# Patient Record
Sex: FEMALE | Race: ASIAN | NOT HISPANIC OR LATINO | Employment: OTHER | URBAN - METROPOLITAN AREA
[De-identification: names, ages, dates, MRNs, and addresses within clinical notes are randomized per-mention and may not be internally consistent; named-entity substitution may affect disease eponyms.]

---

## 2017-10-20 ENCOUNTER — OFFICE VISIT - HEALTHEAST (OUTPATIENT)
Dept: FAMILY MEDICINE | Facility: CLINIC | Age: 32
End: 2017-10-20

## 2017-10-20 DIAGNOSIS — R73.01 IMPAIRED FASTING GLUCOSE: ICD-10-CM

## 2017-10-20 DIAGNOSIS — Z00.00 HEALTH CARE MAINTENANCE: ICD-10-CM

## 2017-10-20 DIAGNOSIS — K59.00 CONSTIPATION: ICD-10-CM

## 2017-10-20 DIAGNOSIS — Z11.3 SCREENING FOR STD (SEXUALLY TRANSMITTED DISEASE): ICD-10-CM

## 2017-10-20 DIAGNOSIS — Z23 NEED FOR IMMUNIZATION AGAINST INFLUENZA: ICD-10-CM

## 2017-10-20 LAB
CHOLEST SERPL-MCNC: 166 MG/DL
FASTING STATUS PATIENT QL REPORTED: YES
HBA1C MFR BLD: 5.6 % (ref 3.5–6)
HDLC SERPL-MCNC: 51 MG/DL
LDLC SERPL CALC-MCNC: 102 MG/DL
TRIGL SERPL-MCNC: 66 MG/DL

## 2017-10-20 ASSESSMENT — MIFFLIN-ST. JEOR: SCORE: 1194.75

## 2017-10-23 ENCOUNTER — COMMUNICATION - HEALTHEAST (OUTPATIENT)
Dept: FAMILY MEDICINE | Facility: CLINIC | Age: 32
End: 2017-10-23

## 2017-10-26 LAB
BKR LAB AP ABNORMAL BLEEDING: NO
BKR LAB AP BIRTH CONTROL/HORMONES: NORMAL
BKR LAB AP CERVICAL APPEARANCE: NORMAL
BKR LAB AP GYN ADEQUACY: NORMAL
BKR LAB AP GYN INTERPRETATION: NORMAL
BKR LAB AP HPV REFLEX: NORMAL
BKR LAB AP LMP: NORMAL
BKR LAB AP PATIENT STATUS: NORMAL
BKR LAB AP PREVIOUS ABNORMAL: NORMAL
BKR LAB AP PREVIOUS NORMAL: NORMAL
HIGH RISK?: NO
HPV INTERPRETATION - HISTORICAL: NORMAL
HPV INTERPRETER - HISTORICAL: NORMAL
PATH REPORT.COMMENTS IMP SPEC: NORMAL
RESULT FLAG (HE HISTORICAL CONVERSION): NORMAL

## 2017-11-17 ENCOUNTER — OFFICE VISIT - HEALTHEAST (OUTPATIENT)
Dept: FAMILY MEDICINE | Facility: CLINIC | Age: 32
End: 2017-11-17

## 2017-11-17 DIAGNOSIS — Z71.84 COUNSELING ABOUT TRAVEL: ICD-10-CM

## 2019-02-15 ENCOUNTER — OFFICE VISIT - HEALTHEAST (OUTPATIENT)
Dept: FAMILY MEDICINE | Facility: CLINIC | Age: 34
End: 2019-02-15

## 2019-02-15 DIAGNOSIS — I83.93 VARICOSE VEINS OF BOTH LOWER EXTREMITIES, UNSPECIFIED WHETHER COMPLICATED: ICD-10-CM

## 2019-02-20 ENCOUNTER — OFFICE VISIT - HEALTHEAST (OUTPATIENT)
Dept: VASCULAR SURGERY | Facility: CLINIC | Age: 34
End: 2019-02-20

## 2019-02-20 DIAGNOSIS — I78.1 SPIDER VEINS: ICD-10-CM

## 2019-02-20 DIAGNOSIS — I83.893 SYMPTOMATIC VARICOSE VEINS OF BOTH LOWER EXTREMITIES: ICD-10-CM

## 2019-02-20 DIAGNOSIS — I87.2 VENOUS INSUFFICIENCY OF BOTH LOWER EXTREMITIES: ICD-10-CM

## 2019-02-20 ASSESSMENT — MIFFLIN-ST. JEOR: SCORE: 1233.82

## 2019-12-06 ENCOUNTER — AMBULATORY - HEALTHEAST (OUTPATIENT)
Dept: LAB | Facility: CLINIC | Age: 34
End: 2019-12-06

## 2019-12-06 ENCOUNTER — OFFICE VISIT - HEALTHEAST (OUTPATIENT)
Dept: UROLOGY | Facility: CLINIC | Age: 34
End: 2019-12-06

## 2019-12-06 DIAGNOSIS — N20.1 CALCULUS OF URETER: ICD-10-CM

## 2019-12-06 LAB
ALBUMIN UR-MCNC: NEGATIVE MG/DL
APPEARANCE UR: ABNORMAL
BILIRUB UR QL STRIP: NEGATIVE
CALCIUM SERPL-MCNC: 9.3 MG/DL (ref 8.5–10.5)
CALCIUM, IONIZED MEASURED: 1.18 MMOL/L (ref 1.11–1.3)
COLOR UR AUTO: YELLOW
CREAT SERPL-MCNC: 0.71 MG/DL (ref 0.6–1.1)
GFR SERPL CREATININE-BSD FRML MDRD: >60 ML/MIN/1.73M2
GLUCOSE UR STRIP-MCNC: NEGATIVE MG/DL
HGB UR QL STRIP: ABNORMAL
ION CA PH 7.4: 1.15 MMOL/L (ref 1.11–1.3)
KETONES UR STRIP-MCNC: NEGATIVE MG/DL
LEUKOCYTE ESTERASE UR QL STRIP: NEGATIVE
NITRATE UR QL: NEGATIVE
PH UR STRIP: 7 [PH] (ref 5–8)
PH: 7.33 (ref 7.35–7.45)
PHOSPHATE SERPL-MCNC: 4.1 MG/DL (ref 2.5–4.5)
PTH-INTACT SERPL-MCNC: 65 PG/ML (ref 10–86)
SP GR UR STRIP: 1.02 (ref 1–1.03)
URATE SERPL-MCNC: 4.9 MG/DL (ref 2–7.5)
UROBILINOGEN UR STRIP-ACNC: ABNORMAL

## 2019-12-09 LAB
APPEARANCE STONE: NORMAL
COMPN STONE: NORMAL
NUMBER STONE: 1
SIZE STONE: NORMAL MM
SPECIMEN WT: 5 MG

## 2019-12-10 ENCOUNTER — AMBULATORY - HEALTHEAST (OUTPATIENT)
Dept: LAB | Facility: CLINIC | Age: 34
End: 2019-12-10

## 2019-12-10 DIAGNOSIS — N20.1 CALCULUS OF URETER: ICD-10-CM

## 2019-12-10 LAB
CALCIUM 24H UR-MRATE: 212 MG/24HR (ref 20–275)
CHLORIDE 24H UR-SRATE: 40 MMOL/24HR (ref 110–250)
CITRATE 24H UR-MCNC: 122 MG/24HR
CREATININE, 24 HR URINE - HISTORICAL: 1145.1 MG/24HR
MAGNESIUM 24H UR-MRATE: 44 MG/24 HR (ref 75–150)
MAGNESIUM UR-MCNC: 8 MG/DL
OXALATE MG/SPEC: 9 MG/24HR (ref 7–44)
PH UR STRIP: 6 [PH] (ref 4.5–8)
PHOSPHORUS URINE MG/SPEC: 568.7 MG/24HR
POTASSIUM 24H UR-SCNC: 17 MMOL/24HR (ref 30–90)
SODIUM 24H UR-SRATE: 54 MMOL/24HR (ref 40–217)
SPECIMEN VOL UR: 550 ML
SPECIMEN VOL UR: 550 ML
URIC ACID URINE MG/SPEC: 436 MG/24HR (ref 250–750)

## 2019-12-17 LAB
CALCIUM 24H UR-MRATE: 452 MG/24HR (ref 20–275)
CHLORIDE 24H UR-SRATE: 151 MMOL/24HR (ref 110–250)
CITRATE 24H UR-MCNC: 163 MG/24HR
CREATININE, 24 HR URINE - HISTORICAL: 1076.9 MG/24HR
MAGNESIUM 24H UR-MRATE: 66 MG/24 HR (ref 75–150)
MAGNESIUM UR-MCNC: 6 MG/DL
OXALATE MG/SPEC: 12.7 MG/24HR (ref 7–44)
PH UR STRIP: 6.5 [PH] (ref 4.5–8)
PHOSPHORUS URINE MG/SPEC: 707.3 MG/24HR
POTASSIUM 24H UR-SCNC: 35 MMOL/24HR (ref 30–90)
SODIUM 24H UR-SRATE: 144 MMOL/24HR (ref 40–217)
SPECIMEN VOL UR: 1100 ML
SPECIMEN VOL UR: 1100 ML
URIC ACID URINE MG/SPEC: 472 MG/24HR (ref 250–750)

## 2019-12-26 ENCOUNTER — COMMUNICATION - HEALTHEAST (OUTPATIENT)
Dept: EMERGENCY MEDICINE | Facility: HOSPITAL | Age: 34
End: 2019-12-26

## 2019-12-26 DIAGNOSIS — N30.00 ACUTE CYSTITIS WITHOUT HEMATURIA: ICD-10-CM

## 2020-02-20 ENCOUNTER — OFFICE VISIT - HEALTHEAST (OUTPATIENT)
Dept: FAMILY MEDICINE | Facility: CLINIC | Age: 35
End: 2020-02-20

## 2020-02-20 ENCOUNTER — COMMUNICATION - HEALTHEAST (OUTPATIENT)
Dept: FAMILY MEDICINE | Facility: CLINIC | Age: 35
End: 2020-02-20

## 2020-02-20 DIAGNOSIS — H61.22 IMPACTED CERUMEN OF LEFT EAR: ICD-10-CM

## 2020-02-20 DIAGNOSIS — N39.0 ACUTE UTI: ICD-10-CM

## 2020-02-20 LAB
ALBUMIN UR-MCNC: NEGATIVE MG/DL
APPEARANCE UR: CLEAR
BACTERIA #/AREA URNS HPF: ABNORMAL HPF
BILIRUB UR QL STRIP: NEGATIVE
COLOR UR AUTO: YELLOW
GLUCOSE UR STRIP-MCNC: NEGATIVE MG/DL
HGB UR QL STRIP: NEGATIVE
KETONES UR STRIP-MCNC: NEGATIVE MG/DL
LEUKOCYTE ESTERASE UR QL STRIP: ABNORMAL
NITRATE UR QL: NEGATIVE
PH UR STRIP: 6 [PH] (ref 5–8)
RBC #/AREA URNS AUTO: ABNORMAL HPF
SP GR UR STRIP: 1.01 (ref 1–1.03)
SQUAMOUS #/AREA URNS AUTO: ABNORMAL LPF
UROBILINOGEN UR STRIP-ACNC: ABNORMAL
WBC #/AREA URNS AUTO: ABNORMAL HPF
YEAST #/AREA URNS HPF: ABNORMAL HPF

## 2020-02-20 ASSESSMENT — MIFFLIN-ST. JEOR: SCORE: 1213.19

## 2020-02-21 ENCOUNTER — OFFICE VISIT - HEALTHEAST (OUTPATIENT)
Dept: UROLOGY | Facility: CLINIC | Age: 35
End: 2020-02-21

## 2020-02-21 ENCOUNTER — HOSPITAL ENCOUNTER (OUTPATIENT)
Dept: CT IMAGING | Facility: CLINIC | Age: 35
Discharge: HOME | End: 2020-02-21
Attending: SPECIALIST

## 2020-02-21 DIAGNOSIS — N20.1 CALCULUS OF URETER: ICD-10-CM

## 2020-02-21 DIAGNOSIS — R82.994 HYPERCALCIURIA: ICD-10-CM

## 2020-02-21 DIAGNOSIS — N20.0 CALCULUS OF KIDNEY: ICD-10-CM

## 2020-02-21 DIAGNOSIS — R82.991 HYPOCITRATURIA: ICD-10-CM

## 2020-02-21 DIAGNOSIS — N39.0 URINARY TRACT INFECTION WITHOUT HEMATURIA, SITE UNSPECIFIED: ICD-10-CM

## 2020-02-21 DIAGNOSIS — R10.9 FLANK PAIN: ICD-10-CM

## 2020-02-21 DIAGNOSIS — R34 LOW URINE OUTPUT: ICD-10-CM

## 2020-02-21 LAB
ALBUMIN UR-MCNC: NEGATIVE MG/DL
APPEARANCE UR: CLEAR
BACTERIA SPEC CULT: NO GROWTH
BILIRUB UR QL STRIP: NEGATIVE
COLOR UR AUTO: YELLOW
GLUCOSE UR STRIP-MCNC: NEGATIVE MG/DL
HGB UR QL STRIP: NEGATIVE
KETONES UR STRIP-MCNC: ABNORMAL MG/DL
LEUKOCYTE ESTERASE UR QL STRIP: NEGATIVE
NITRATE UR QL: NEGATIVE
PH UR STRIP: 6 [PH] (ref 5–8)
SP GR UR STRIP: >=1.03 (ref 1–1.03)
UROBILINOGEN UR STRIP-ACNC: ABNORMAL

## 2020-03-29 ENCOUNTER — HOSPITAL ENCOUNTER (OUTPATIENT)
Dept: ADMINISTRATIVE | Facility: OTHER | Age: 35
Discharge: HOME | End: 2020-03-29

## 2020-03-30 ENCOUNTER — AMBULATORY - HEALTHEAST (OUTPATIENT)
Dept: UROLOGY | Facility: CLINIC | Age: 35
End: 2020-03-30

## 2020-03-30 DIAGNOSIS — N20.1 CALCULUS OF URETER: ICD-10-CM

## 2020-04-03 ENCOUNTER — AMBULATORY - HEALTHEAST (OUTPATIENT)
Dept: LAB | Facility: CLINIC | Age: 35
End: 2020-04-03

## 2020-04-03 DIAGNOSIS — N20.1 CALCULUS OF URETER: ICD-10-CM

## 2020-04-03 LAB
CALCIUM 24H UR-MRATE: 382 MG/24HR (ref 20–275)
CHLORIDE 24H UR-SRATE: 208 MMOL/24HR (ref 110–250)
CITRATE 24H UR-MCNC: 198 MG/24HR
CREATININE, 24 HR URINE - HISTORICAL: 1531.3 MG/24HR
MAGNESIUM 24H UR-MRATE: 115 MG/24 HR (ref 75–150)
MAGNESIUM UR-MCNC: 4.7 MG/DL
OXALATE MG/SPEC: 38.2 MG/24HR (ref 7–44)
PH UR STRIP: 6 [PH] (ref 4.5–8)
PHOSPHORUS URINE MG/SPEC: 1002.1 MG/24HR
POTASSIUM 24H UR-SCNC: 53 MMOL/24HR (ref 30–90)
SODIUM 24H UR-SRATE: 213 MMOL/24HR (ref 40–217)
SPECIMEN VOL UR: 2450 ML
SPECIMEN VOL UR: 2450 ML
URIC ACID URINE MG/SPEC: 1027 MG/24HR (ref 250–750)

## 2020-04-07 ENCOUNTER — OFFICE VISIT - HEALTHEAST (OUTPATIENT)
Dept: UROLOGY | Facility: CLINIC | Age: 35
End: 2020-04-07

## 2020-04-07 DIAGNOSIS — N20.0 CALCULUS OF KIDNEY: ICD-10-CM

## 2020-04-07 DIAGNOSIS — R82.991 HYPOCITRATURIA: ICD-10-CM

## 2020-04-07 DIAGNOSIS — R34 LOW URINE OUTPUT: ICD-10-CM

## 2020-04-07 DIAGNOSIS — R82.994 HYPERCALCIURIA: ICD-10-CM

## 2020-04-07 DIAGNOSIS — R82.998 HIGH URINE SODIUM: ICD-10-CM

## 2020-04-07 DIAGNOSIS — Z87.442 HISTORY OF KIDNEY STONES: ICD-10-CM

## 2020-04-29 ENCOUNTER — COMMUNICATION - HEALTHEAST (OUTPATIENT)
Dept: FAMILY MEDICINE | Facility: CLINIC | Age: 35
End: 2020-04-29

## 2020-04-29 ENCOUNTER — OFFICE VISIT - HEALTHEAST (OUTPATIENT)
Dept: FAMILY MEDICINE | Facility: CLINIC | Age: 35
End: 2020-04-29

## 2020-04-29 ENCOUNTER — AMBULATORY - HEALTHEAST (OUTPATIENT)
Dept: FAMILY MEDICINE | Facility: CLINIC | Age: 35
End: 2020-04-29

## 2020-04-29 DIAGNOSIS — N93.9 VAGINAL BLEEDING: ICD-10-CM

## 2020-04-29 DIAGNOSIS — N30.01 ACUTE CYSTITIS WITH HEMATURIA: ICD-10-CM

## 2020-04-29 DIAGNOSIS — Z13.9 SCREENING FOR CONDITION: ICD-10-CM

## 2020-04-29 DIAGNOSIS — Z32.01 POSITIVE URINE PREGNANCY TEST: ICD-10-CM

## 2020-04-29 LAB
ALBUMIN UR-MCNC: NEGATIVE MG/DL
APPEARANCE UR: CLEAR
BACTERIA #/AREA URNS HPF: ABNORMAL HPF
BILIRUB UR QL STRIP: NEGATIVE
COLOR UR AUTO: YELLOW
GLUCOSE UR STRIP-MCNC: NEGATIVE MG/DL
HCG UR QL: POSITIVE
HGB UR QL STRIP: ABNORMAL
KETONES UR STRIP-MCNC: NEGATIVE MG/DL
LEUKOCYTE ESTERASE UR QL STRIP: ABNORMAL
NITRATE UR QL: NEGATIVE
PH UR STRIP: 6 [PH] (ref 5–8)
RBC #/AREA URNS AUTO: ABNORMAL HPF
SP GR UR STRIP: 1.01 (ref 1–1.03)
SQUAMOUS #/AREA URNS AUTO: ABNORMAL LPF
UROBILINOGEN UR STRIP-ACNC: ABNORMAL
WBC #/AREA URNS AUTO: ABNORMAL HPF
YEAST #/AREA URNS HPF: ABNORMAL HPF

## 2020-04-30 LAB — BACTERIA SPEC CULT: ABNORMAL

## 2020-05-08 ENCOUNTER — COMMUNICATION - HEALTHEAST (OUTPATIENT)
Dept: FAMILY MEDICINE | Facility: CLINIC | Age: 35
End: 2020-05-08

## 2020-05-15 ENCOUNTER — RECORDS - HEALTHEAST (OUTPATIENT)
Dept: ADMINISTRATIVE | Facility: OTHER | Age: 35
End: 2020-05-15

## 2020-05-29 ENCOUNTER — RECORDS - HEALTHEAST (OUTPATIENT)
Dept: ADMINISTRATIVE | Facility: OTHER | Age: 35
End: 2020-05-29

## 2020-06-04 ENCOUNTER — COMMUNICATION - HEALTHEAST (OUTPATIENT)
Dept: FAMILY MEDICINE | Facility: CLINIC | Age: 35
End: 2020-06-04

## 2020-06-04 DIAGNOSIS — O98.111 SYPHILIS AFFECTING PREGNANCY IN FIRST TRIMESTER: ICD-10-CM

## 2020-06-05 ENCOUNTER — AMBULATORY - HEALTHEAST (OUTPATIENT)
Dept: NURSING | Facility: CLINIC | Age: 35
End: 2020-06-05

## 2020-06-12 ENCOUNTER — AMBULATORY - HEALTHEAST (OUTPATIENT)
Dept: NURSING | Facility: CLINIC | Age: 35
End: 2020-06-12

## 2020-06-15 ENCOUNTER — AMBULATORY - HEALTHEAST (OUTPATIENT)
Dept: MATERNAL FETAL MEDICINE | Facility: HOSPITAL | Age: 35
End: 2020-06-15

## 2020-06-15 DIAGNOSIS — O26.90 PREGNANCY, ANTEPARTUM, COMPLICATIONS: ICD-10-CM

## 2020-06-18 ENCOUNTER — AMBULATORY - HEALTHEAST (OUTPATIENT)
Dept: NURSING | Facility: CLINIC | Age: 35
End: 2020-06-18

## 2020-07-20 ENCOUNTER — AMBULATORY - HEALTHEAST (OUTPATIENT)
Dept: MATERNAL FETAL MEDICINE | Facility: HOSPITAL | Age: 35
End: 2020-07-20

## 2020-07-24 ENCOUNTER — OFFICE VISIT - HEALTHEAST (OUTPATIENT)
Dept: MATERNAL FETAL MEDICINE | Facility: HOSPITAL | Age: 35
End: 2020-07-24

## 2020-07-24 ENCOUNTER — RECORDS - HEALTHEAST (OUTPATIENT)
Dept: ADMINISTRATIVE | Facility: OTHER | Age: 35
End: 2020-07-24

## 2020-07-24 ENCOUNTER — RECORDS - HEALTHEAST (OUTPATIENT)
Dept: ULTRASOUND IMAGING | Facility: HOSPITAL | Age: 35
End: 2020-07-24

## 2020-07-24 DIAGNOSIS — O26.90 PREGNANCY RELATED CONDITIONS, UNSPECIFIED, UNSPECIFIED TRIMESTER: ICD-10-CM

## 2020-07-24 DIAGNOSIS — O26.90 PREGNANCY, ANTEPARTUM, COMPLICATIONS: ICD-10-CM

## 2020-07-24 DIAGNOSIS — O09.522 MULTIGRAVIDA OF ADVANCED MATERNAL AGE IN SECOND TRIMESTER: ICD-10-CM

## 2020-12-08 ENCOUNTER — HOSPITAL ENCOUNTER (OUTPATIENT)
Dept: MEDSURG UNIT | Facility: CLINIC | Age: 35
Discharge: HOME | End: 2020-12-08
Attending: OBSTETRICS & GYNECOLOGY | Admitting: OBSTETRICS & GYNECOLOGY

## 2020-12-08 LAB
ALBUMIN UR-MCNC: NEGATIVE MG/DL
APPEARANCE UR: CLEAR
BACTERIA #/AREA URNS HPF: ABNORMAL HPF
BILIRUB UR QL STRIP: NEGATIVE
COLOR UR AUTO: ABNORMAL
GLUCOSE UR STRIP-MCNC: NEGATIVE MG/DL
HGB UR QL STRIP: NEGATIVE
KETONES UR STRIP-MCNC: NEGATIVE MG/DL
LEUKOCYTE ESTERASE UR QL STRIP: ABNORMAL
MUCOUS THREADS #/AREA URNS LPF: ABNORMAL LPF
NITRATE UR QL: NEGATIVE
PH UR STRIP: 7 [PH] (ref 4.5–8)
RBC #/AREA URNS AUTO: ABNORMAL HPF
SP GR UR STRIP: 1 (ref 1–1.03)
SQUAMOUS #/AREA URNS AUTO: ABNORMAL LPF
UROBILINOGEN UR STRIP-ACNC: ABNORMAL
WBC #/AREA URNS AUTO: ABNORMAL HPF

## 2020-12-08 ASSESSMENT — MIFFLIN-ST. JEOR: SCORE: 1345.86

## 2020-12-09 ENCOUNTER — AMBULATORY - HEALTHEAST (OUTPATIENT)
Dept: OBGYN | Facility: CLINIC | Age: 35
End: 2020-12-09

## 2020-12-09 DIAGNOSIS — Z11.59 ENCOUNTER FOR SCREENING FOR OTHER VIRAL DISEASES: ICD-10-CM

## 2020-12-10 LAB — BACTERIA SPEC CULT: ABNORMAL

## 2020-12-11 ENCOUNTER — SURGERY - HEALTHEAST (OUTPATIENT)
Dept: OBGYN | Facility: CLINIC | Age: 35
End: 2020-12-11

## 2020-12-11 ENCOUNTER — COMMUNICATION - HEALTHEAST (OUTPATIENT)
Dept: SCHEDULING | Facility: CLINIC | Age: 35
End: 2020-12-11

## 2020-12-11 ENCOUNTER — ANESTHESIA - HEALTHEAST (OUTPATIENT)
Dept: OBGYN | Facility: CLINIC | Age: 35
End: 2020-12-11

## 2020-12-11 ASSESSMENT — MIFFLIN-ST. JEOR: SCORE: 1345.86

## 2020-12-14 ENCOUNTER — COMMUNICATION - HEALTHEAST (OUTPATIENT)
Dept: OBGYN | Facility: CLINIC | Age: 35
End: 2020-12-14

## 2021-05-26 VITALS — HEART RATE: 60 BPM | TEMPERATURE: 97.7 F | SYSTOLIC BLOOD PRESSURE: 133 MMHG | DIASTOLIC BLOOD PRESSURE: 78 MMHG

## 2021-05-26 VITALS — DIASTOLIC BLOOD PRESSURE: 63 MMHG | SYSTOLIC BLOOD PRESSURE: 111 MMHG | HEART RATE: 58 BPM | TEMPERATURE: 97.9 F

## 2021-05-28 ENCOUNTER — RECORDS - HEALTHEAST (OUTPATIENT)
Dept: ADMINISTRATIVE | Facility: CLINIC | Age: 36
End: 2021-05-28

## 2021-05-31 VITALS — WEIGHT: 128.06 LBS | BODY MASS INDEX: 23.81 KG/M2

## 2021-05-31 VITALS — HEIGHT: 62 IN | WEIGHT: 124.44 LBS | BODY MASS INDEX: 22.9 KG/M2

## 2021-06-01 ENCOUNTER — RECORDS - HEALTHEAST (OUTPATIENT)
Dept: ADMINISTRATIVE | Facility: CLINIC | Age: 36
End: 2021-06-01

## 2021-06-02 VITALS — WEIGHT: 128.31 LBS | BODY MASS INDEX: 23.85 KG/M2

## 2021-06-02 VITALS — BODY MASS INDEX: 24.16 KG/M2 | HEIGHT: 62 IN | WEIGHT: 131.3 LBS

## 2021-06-04 VITALS
DIASTOLIC BLOOD PRESSURE: 66 MMHG | BODY MASS INDEX: 25.32 KG/M2 | HEART RATE: 71 BPM | SYSTOLIC BLOOD PRESSURE: 111 MMHG | TEMPERATURE: 98.1 F | RESPIRATION RATE: 15 BRPM | WEIGHT: 134 LBS | OXYGEN SATURATION: 100 %

## 2021-06-04 VITALS
WEIGHT: 130.25 LBS | SYSTOLIC BLOOD PRESSURE: 100 MMHG | HEIGHT: 61 IN | HEART RATE: 67 BPM | DIASTOLIC BLOOD PRESSURE: 58 MMHG | OXYGEN SATURATION: 97 % | BODY MASS INDEX: 24.59 KG/M2 | TEMPERATURE: 97.7 F

## 2021-06-04 NOTE — PATIENT INSTRUCTIONS - HE
Patient Stated Goal: Prevent further stones  Steps for collecting a 24 hour urine specimen    Please follow the directions carefully. All urine voided for a 24-hour period needs to be collected into the jug.  DO NOT change any of your  normal  daily habits when doing this test. Continue to follow your regular diet, intake of fluids, and usual activity level. Pick the most convenient day with your schedule, perhaps on a weekend or a day off.    Start your Diet Log the day before collection and continue on the day of urine collection.  You MUST bring Diet Log with you on follow up visit to discuss results.    One 24hr Urine Collection     Two 24hr Urine Collections  (do not collect on consecutive days)    PLEASE COMPLETE THE 2nd JUG WITHIN 1-2 WEEKS FROM THE 1st JUG    STEP 1  Empty your bladder completely into the toilet. This will be your start time. Write your full legal name, start date and time on the jug label.  Collection start and stop times need to match exactly!  For example:  6 am to 6 am.    STEP 2  The next time you urinate, empty your bladder directly into the jug or collection hat and pour urine into the jug.  Screw the lid back onto the jug.  Do not spill!    STEP 3  Place the jug in the refrigerator or a cooler with ice during the collection period.  Failure to keep it cool could cause inaccurate test results. DO NOT Freeze.    STEP 4  Continue collecting all urine into the jug for the rest of the day, for the full 24 hours.  DO NOT stop early or go over 24 hours!    STEP 5  Exactly 24 hours from start of collection, write your full legal name, stop date and time on the jug label.   Collection start and stop times need to match exactly!  For example:  6 am to 6 am.  Failure to label correctly will result in recollection of urine specimen.    STEP 6  Return each jug within 24 hours after final urination.     STEP 7  Drop off jug locations:   [facility]: Mon-Fri 7am-7pm - Closed on  weekends  [facility]: Mon-Fri 7am-5pm - Closed on Sunday  [facility]: Mon-Fri 7am-6:30pm - Closed on weekends    STEP 8  Please call [facility] after return of your final jug to schedule your follow-up visit. [phone]

## 2021-06-04 NOTE — PROGRESS NOTES
Assessment/Plan:        Diagnoses and all orders for this visit:    Calculus of ureter  -     Urinalysis Macroscopic  -     Stone Analysis; Future  -     Stone Analysis  -     Magnesium, 24 Hour Urine; Future  -     Stone Formation, 24 Hour Urine (does not include Magnesium); Standing  -     Uric Acid; Future; Expected date: 12/20/2019  -     Calcium, Ionized, Measured; Future; Expected date: 12/20/2019  -     Parathyroid Hormone Intact with Minerals; Future; Expected date: 12/20/2019  -     Patient Stated Goal: Prevent further stones  -     24 Hour Urine Collection Steps Education      Stone Management Plan  [facility] 12/6/2019   Urinary Tract Infection No suspicion of infection   Renal Colic Asymptomatic at this time   Renal Failure No suspicion of renal failure   Current CT date 11/24/2019   Right sided stones? No   R Stone Event No current event   Left sided stones? Yes   L Number of ureteral stones 1   L GSD of ureteral stones 3   L Location of ureteral stone Distal   L Number of kidney stones  No renal stones   L GSD of kidney stones N/A   L Hydronephrosis Mild   L Stone Event New stone passed prior to visit         Subjective:      HPI  Ms. Kelly Shannon is a 34 y.o. Hmong female presenting to the [facility] following [facility] ER visit for urolithiasis.    She is a first time unidentified composition stone former who has not required stone clearance procedures. She has not previously participated in stone risk evaluation. She has no identified modifiable stone risk factors. She has no identified non-modifiable stone risk factors.    She was seen in ER 11/24/19 for acute onset urinary urgency and dysuria x 3 days. She had associated left flank and abdominal pain, that was intermittent and sharp. No associated alleviating or aggravating factors. No similar pain in past. Workup was notable for CT reporting an obstructing 2 mm left UVJ stone. She was sent home with medications.    She  passed the stone a few days later and has brought it in. She is asymptomatic at present. She denies symptoms of fever, chills, flank pain, nausea, vomiting, urinary frequency and dysuria.     CT scan is personally reviewed and demonstrates a ~ 2 mm left UVJ stone which has passed in interval. Mild left hydronephrosis.    Significant labs from presentation include moderate hematuria, no pyuria, negative nitrite, no bacteria, normal WBC, normal creatinine and normal potassium.    PLAN    33 yo Hmong F first time stone former with interval passage of left distal ureteral stone, specimen retrieved.    Will send stone for analysis.    Will initiate comprehensive stone risk evaluation. Serum stone risk chemistries including parathyroid hormone and ionized calcium will be obtained before next visit. Two 24 hour urine collections and dietary journal will be obtained at earliest covenience. Stone prevention measures reviewed with patient. Patient verbalized understanding. Patient agrees with plan as discussed.  She will return to clinic when labs are available.       Patient also seen and examined by LUIS DANIEL Sprague   Review of Systems  A 12 point comprehensive review of systems is negative except for HPI    Past Medical History:   Diagnosis Date     Idiopathic Thrombocytopenic Purpura     Created by Conversion      Kidney stone      Varicose vein of leg      Past Surgical History:   Procedure Laterality Date     NO PAST SURGERIES       No current outpatient medications on file.     No current facility-administered medications for this visit.      No Known Allergies    Social History     Socioeconomic History     Marital status:      Spouse name: Not on file     Number of children: Not on file     Years of education: Not on file     Highest education level: Not on file   Occupational History     Occupation:    Social Needs     Financial resource strain: Not on file     Food insecurity:      Worry: Not on file     Inability: Not on file     Transportation needs:     Medical: Not on file     Non-medical: Not on file   Tobacco Use     Smoking status: Never Smoker     Smokeless tobacco: Never Used   Substance and Sexual Activity     Alcohol use: No     Drug use: Not on file     Sexual activity: Not on file   Lifestyle     Physical activity:     Days per week: Not on file     Minutes per session: Not on file     Stress: Not on file   Relationships     Social connections:     Talks on phone: Not on file     Gets together: Not on file     Attends [employer] service: Not on file     Active member of club or organization: Not on file     Attends meetings of clubs or organizations: Not on file     Relationship status: Not on file     Intimate partner violence:     Fear of current or ex partner: Not on file     Emotionally abused: Not on file     Physically abused: Not on file     Forced sexual activity: Not on file   Other Topics Concern     Not on file   Social History Narrative     Not on file       Family History   Problem Relation Age of Onset     No Medical Problems Daughter      Urolithiasis Neg Hx      Gout Neg Hx      Clotting disorder Neg Hx      Diabetes Neg Hx      Cancer Neg Hx      Heart disease Neg Hx        Objective:      Physical Exam  Vitals:    12/06/19 0857   BP: 133/78   Pulse: 60   Temp: 97.7  F (36.5  C)     General - well developed, well nourished, appropriate for age. Appears no distress at this time   Heart - regular rate and rhythm, no murmur  Respiratory - normal effort, clear to auscultation, good air entry without adventitious noises  Abdomen - slender soft, non-tender, no hepatosplenomegaly, no masses.   - no flank tenderness, no suprapubic tenderness, kidney and bladder non-palpable  MSK - normal spinal curvature. no spinal tenderness. normal gait. muscular strength intact.  Neurology - cranial nerves II-XII grossly intact, normal sensation, no unsteadiness  Skin - intact, no  bruising, no gouty tophi  Psych - oriented to time, place, and person, normal mood and affect.    Labs  Urinalysis POC (Office):  Nitrite, UA   Date Value Ref Range Status   12/06/2019 Negative Negative Final   11/24/2019 Negative Negative Final   07/08/2013 Negative (Negative) Final       Lab Urinalysis:  Blood, UA   Date Value Ref Range Status   12/06/2019 Large (!) Negative Final   11/24/2019 Moderate (!) Negative Final   07/08/2013 Negative (Negative) Final     Nitrite, UA   Date Value Ref Range Status   12/06/2019 Negative Negative Final   11/24/2019 Negative Negative Final   07/08/2013 Negative (Negative) Final     Leukocytes, UA   Date Value Ref Range Status   12/06/2019 Negative Negative Final   11/24/2019 Negative Negative Final   07/08/2013 Small (!) (Negative) Final     pH, UA   Date Value Ref Range Status   12/06/2019 7.0 5.0 - 8.0 Final   11/24/2019 7.0 4.5 - 8.0 Final   07/08/2013 7.0 (5.0-8.0) Final    and Acute Labs   CBC   WBC   Date Value Ref Range Status   11/24/2019 6.9 4.0 - 11.0 thou/uL Final   07/08/2013 8.4 4.0 - 11.0 thou/uL Final   01/20/2012 6.1 4.0 - 11.0 thou/uL Final     Hemoglobin   Date Value Ref Range Status   11/24/2019 13.1 12.0 - 16.0 g/dL Final   10/20/2017 13.7 12.0 - 16.0 g/dL Final   07/08/2013 15.4 12.0 - 16.0 g/dL Final     Platelets   Date Value Ref Range Status   11/24/2019 235 140 - 440 thou/uL Final   07/08/2013 275 140 - 440 thou/uL Final   01/20/2012 181 140 - 440 thou/uL Final   , C Reactive Protein  No results found for: CRP, Renal Panel  [facility]  Creatinine   Date Value Ref Range Status   11/24/2019 0.71 0.60 - 1.10 mg/dL Final   07/08/2013 0.69 0.60 - 1.10 mg/dL Final   07/19/2011 0.58 (L) 0.60 - 1.10 mg/dL Final     Potassium   Date Value Ref Range Status   11/24/2019 3.5 3.5 - 5.0 mmol/L Final   07/08/2013 4.0 3.5 - 5.0 mmol/L Final   07/19/2011 3.7 3.5 - 5.0 mmol/L Final     Calcium   Date Value Ref Range Status   11/24/2019 9.7 8.5 - 10.5 mg/dL Final   07/08/2013  10.1 8.5 - 10.5 mg/dL Final   07/15/2011 9.4 8.5 - 10.5 mg/dL Final    and Urine Culture  No results found for: CULTURE

## 2021-06-05 VITALS — HEIGHT: 62 IN | WEIGHT: 156 LBS | BODY MASS INDEX: 28.71 KG/M2

## 2021-06-05 VITALS — WEIGHT: 156 LBS | HEIGHT: 62 IN | BODY MASS INDEX: 28.71 KG/M2

## 2021-06-06 NOTE — PROGRESS NOTES
Assessment/Plan:     1. Acute UTI  Urinalysis-UC if Indicated    nitrofurantoin, macrocrystal-monohydrate, (MACROBID) 100 MG capsule    Culture, Urine   2. Impacted cerumen of left ear  Ear cerumen removal       Diagnoses and all orders for this visit:    Acute UTI  -     Urinalysis-UC if Indicated  -     nitrofurantoin, macrocrystal-monohydrate, (MACROBID) 100 MG capsule; Take 1 capsule (100 mg total) by mouth 2 (two) times a day for 7 days.  Dispense: 14 capsule; Refill: 0  -     Culture, Urine  - Patient has nearly completed 7-day course of Keflex.  She does report some improvement in symptoms but still has symptoms of dysuria, lower abdominal discomfort, urinary frequency and decreased urine output.  Will recheck UA and urine culture today.  In meantime we will switch her over to nitrofurantoin which recent urine culture shows that bacteria causing her infection should be sensitive to this antibiotic.  She was counseled on use of medication and common side effects.    Impacted cerumen of left ear  -     Ear cerumen removal was performed today by hospitals    Need for influenza vaccine  -     Influenza, Seasonal Quad, PF =/> 6months  -Counseled patient on vaccine and side effects             Subjective:      Kelly Shannon is a 35 y.o. female who comes in today for emergency room follow-up visit.  She is seen with the assistance of a .  She had an emergency department visit on 2/13/2020 due to dysuria.  UA showed presence of UTI.  She was treated with Keflex.  Culture grew out ESBL E. coli as well as group B strep.  Culture did show resistance to Keflex.  Patient has a couple doses of antibiotic left.  She does feel that symptoms have improved but she still complains of painful urination, urinary frequency and decreased urine output.  She does not have back pain but she does have some pain in her lower abdomen.  She has a history of a kidney stone and she also was seen in the emergency department on a couple  of occasions over the past few months for UTI.  She wonders about the underlying cause of her recurrent UTIs and whether it could be related to kidney stones.  Last imaging did show that patient had passed her stone however.  We reviewed her medications and allergies and updated the chart.  She currently denies fevers and chills.  No nausea or vomiting.  She does complain of a plugged feeling in her left ear.  Would like this looked at.  No cold symptoms.  Review of systems is assessed and is otherwise negative.  No other concerns today.    Current Outpatient Medications   Medication Sig Dispense Refill     ibuprofen (ADVIL,MOTRIN) 600 MG tablet Take 1 tablet (600 mg total) by mouth every 6 (six) hours as needed. 28 tablet 0     nitrofurantoin, macrocrystal-monohydrate, (MACROBID) 100 MG capsule Take 1 capsule (100 mg total) by mouth 2 (two) times a day for 7 days. 14 capsule 0     nitrofurantoin, macrocrystal-monohydrate, (MACROBID) 100 MG capsule Take 1 capsule (100 mg total) by mouth 2 (two) times a day. 20 capsule 2     No current facility-administered medications for this visit.        Past Medical History, Family History, and Social History reviewed.  Past Medical History:   Diagnosis Date     Idiopathic Thrombocytopenic Purpura     Created by Conversion      Kidney stone      Varicose vein of leg      Past Surgical History:   Procedure Laterality Date     NO PAST SURGERIES       Patient has no known allergies.  Family History   Problem Relation Age of Onset     No Medical Problems Daughter      Urolithiasis Neg Hx      Gout Neg Hx      Clotting disorder Neg Hx      Diabetes Neg Hx      Cancer Neg Hx      Heart disease Neg Hx      Social History     Socioeconomic History     Marital status:      Spouse name: Not on file     Number of children: Not on file     Years of education: Not on file     Highest education level: Not on file   Occupational History     Occupation:    Social Needs  "    Financial resource strain: Not on file     Food insecurity:     Worry: Not on file     Inability: Not on file     Transportation needs:     Medical: Not on file     Non-medical: Not on file   Tobacco Use     Smoking status: Never Smoker     Smokeless tobacco: Never Used   Substance and Sexual Activity     Alcohol use: No     Drug use: Not on file     Sexual activity: Not on file   Lifestyle     Physical activity:     Days per week: Not on file     Minutes per session: Not on file     Stress: Not on file   Relationships     Social connections:     Talks on phone: Not on file     Gets together: Not on file     Attends Tenriism service: Not on file     Active member of club or organization: Not on file     Attends meetings of clubs or organizations: Not on file     Relationship status: Not on file     Intimate partner violence:     Fear of current or ex partner: Not on file     Emotionally abused: Not on file     Physically abused: Not on file     Forced sexual activity: Not on file   Other Topics Concern     Not on file   Social History Narrative     Not on file         Review of systems is as stated in HPI, and the remainder of the 10 system review is otherwise negative.    Objective:     Vitals:    02/20/20 1418   BP: 100/58   Patient Site: Right Arm   Patient Position: Sitting   Cuff Size: Adult Regular   Pulse: 67   Temp: 97.7  F (36.5  C)   TempSrc: Oral   SpO2: 97%   Weight: 130 lb 4 oz (59.1 kg)   Height: 5' 1\" (1.549 m)    Body mass index is 24.61 kg/m .    General appearance: alert, appears stated age and cooperative  Head: Normocephalic, without obvious abnormality, atraumatic   Ears: There is cerumen impaction present in left ear  Lungs: clear to auscultation bilaterally  Heart: regular rate and rhythm, S1, S2 normal, no murmur, click, rub or gallop  Abdomen: Soft, normal bowel sounds, tender to palpation across lower abdomen, no mass, no rebound or guarding  Extremities: extremities normal, " atraumatic, no cyanosis or edema  MSK: no CVA tenderness        This note has been dictated using voice recognition software. Any grammatical or context distortions are unintentional and inherent to the the software.

## 2021-06-06 NOTE — PATIENT INSTRUCTIONS - HE
Patient Stated Goal: Prevent further stones  INCREASING FLUIDS TO DECREASE RISK    Low Urinary Volume:     Kidney stones form because there is not enough water to dissolve the concentrated chemicals and minerals in urine.     Studies have found that people who make kidney stones should drink enough fluids so that they produce at least 2 liters (2 quarts) of urine per day.     Studies have shown that virtually every fluid you might drink decreases the risk of stone formation. Acceptable fluids include milk, coffee, diet and regular sodas, alcoholic beverages, fruit juices and even plain old water.    Increasing fluid intake will increase urine volume and decrease the chance of kidney stone formation.    Fluids:    Anything liquid that fits in a glass counts.     One way to gauge if your body has enough fluids is to check the color of your urine. If the urine is dark and yellow you do not have enough fluids. Urine will be pale yellow to clear if your body has enough fluids.    What we need to survive:    Most fluid we drink is lost through evaporation, more if active in hot humid environments    Body wastes can be cleared in a small amount of urine    All fluid that is consumed in excess of necessary losses  dilutes the urine    Ways to Increase Fluids Daily:    Drink more fluids throughout the day and into the evening. (You may need to awake from sleep to urinate which is a good indication of volume status)    Keep your refrigerator stocked with beverages you like    Drink a variety of fluids - mix it up    Add another beverage to your regular beverage at every meal    Keep a beverage at your desk (work area) and finish it before you leave for breaks, meetings, lunch and end of day    Use larger volume glasses    Whenever you pass a water fountain - stop and drink    Drink a beverage with snacks, if it is a salty snack, double the beverage    Take medication with a full glass of water/fluid    Keep a bottle of  water in your car and drink every 20 miles    Eat high water content fruits (melons, oranges, grapes, etc.)    Flavor water with a squeeze of lemon or lime or Crystal Light     If you do something repetitive throughout the day, link it with having something to drink    When you give your child/children something to drink, you have something to drink also    [facility] can respond to your questions or concerns 24 hours a day at [phone].      FOODS RICH IN SODIUM/SALT    If you eat too much salt or sodium, you may increase the calcium in your urine.  That may cause stones to form.  A low sodium/salt diet is recommended to reduce excess urinary calcium excretion that can potentially form a kidney stone.  The recommended sodium intake by [facility] is less than 2500 milligrams per day.     You should usually avoid these items:    ? Salt - One teaspoon of table salt has 2400 milligrams of sodium  ? Processed foods - salt is added in large amounts to some regular foods    ? Examples are:      Canned foods - soups, stews, sauces, gravy mixes, and some vegetables      Frozen foods - dinners, entrees, vegetables with sauces      Snack foods - salted chips, popcorn, pretzels, pork rinds and crackers      Packaged starchy foods - seasoned noodle or rice dishes, stuffing mix, macaroni and cheese dinner      Instant cooking foods to which you add hot water and stir - potatoes, cereals, noodles, etc.       (salt is added to make precooked foods absorb water faster)      Mixes - cornbread, biscuit, cake or pudding      Meats and cheeses  - Deli or lunch meats - bologna, ham, turkey, roast beef, etc.  - Cured or smoked meats - corned beef, stone, sausage of any kind  (doug, link, Kielbasa, Tamazight, wieners or hot dogs)  - Canned meats - potted meats, spreads, Yesenia sausage, etc.  - Cheeses - read labels and avoid those with more than 140 mg sodium per serving;  - Examples are:       American cheese      Hajaeta         Suzanne Whiz      ? Condiments, Sauces and Seasonings        Mustard, ketchup, salad dressings, bouillon cubes or granules      Sauces - Worcestershire, barbecue, pizza, chili, steak, soy, or horseradish sauce      Meat tenderizer, monosodium glutamate      Any seasoning that has  salt  in the name or on the label;  -  Avoid celery, garlic and onion salt; however use garlic or onion powder or flakes instead       Pickles and olives    What can you use to season you food?         Tart - try lemon or lime juice, vinegar      Hot - peppers are low in sodium; hot sauce has salt, but using just a drop or two will not add        up to much       Herbs and spices - onions, garlic, salt-free seasonings

## 2021-06-06 NOTE — TELEPHONE ENCOUNTER
Forms Request  Name of form/paperwork: Other:  Note for work   Have you been seen for this request: Yes:  Had just left her appointment with Dr. Pereyra and forgot to ask for a letter.   Do we have the form: No its a note that she is wanting for missing work.  When is form needed by: wanting to  today or tomorrow if not able to get it today. Please call the patient when able to .   How would you like the form returned:   Patient Notified form requests are processed in 3-5 business days: Yes    Okay to leave a detailed message? Yes

## 2021-06-06 NOTE — PROGRESS NOTES
Assessment/Plan:        Diagnoses and all orders for this visit:    Urinary tract infection without hematuria, site unspecified  -     CT Abdomen Pelvis Without Oral Without IV Contrast; Future; Expected date: 02/21/2020  -     nitrofurantoin, macrocrystal-monohydrate, (MACROBID) 100 MG capsule; Take 1 capsule (100 mg total) by mouth 2 (two) times a day.  Dispense: 20 capsule; Refill: 2    Calculus of ureter  -     Urinalysis Macroscopic  -     CT Abdomen Pelvis Without Oral Without IV Contrast; Future; Expected date: 02/21/2020    Flank pain  -     CT Abdomen Pelvis Without Oral Without IV Contrast; Future; Expected date: 02/21/2020      Stone Management Plan  [facility] 12/6/2019   Urinary Tract Infection No suspicion of infection   Renal Colic Asymptomatic at this time   Renal Failure No suspicion of renal failure   Current CT date 11/24/2019   Right sided stones? No   R Stone Event No current event   Left sided stones? Yes   L Number of ureteral stones 1   L GSD of ureteral stones 3   L Location of ureteral stone Distal   L Number of kidney stones  No renal stones   L GSD of kidney stones N/A   L Hydronephrosis Mild   L Stone Event New stone passed prior to visit             Subjective:      HPI  Ms. Kelly Shannon is a 35 y.o.  female returning to the [facility] for for follow-up of stone risk studies having passed a stone back in December that was 90% calcium phosphate appetite and 10% calcium oxalate.  Subsequent to that she had 2 urinary tract infections both E. coli that were resistant to Bactrim.  She was seen in the ER on 12/22/2019 with dysuria frequency urgency found to have pyuria with culture showing E. coli resistant to Bactrim but she was started on and it was not changed.  She then had a recurrence of her symptoms of urgency frequency burning.  She was seen in the ER on 2/13/2020 again and culture showed E. Coli.  She was treated with Keflex and when sensitivities came  out the organism was resistant to to Keflex.  On 2/20/2020 Dr. Pereyra changed the prescription to Macrobid which was sensitive on both cultures from 12/22/2019 and 2/13/2020.    Patient comes at this time wondering where the recurrent infections are coming from as she is never had them before.  Patient did not have a follow-up CT after passing the one small stone was told Dr. Pereyra wondered if this might be useful.  In addition patient comes in inquiring about the results of her 24-hour urine stone studies and serum studies.    Serum studies included a normal PTH calcium and uric acid with creatinine of 0.71.  24-hour urine studies demonstrated extremely low volume at 1100 cc and 550 cc.  Calcium was markedly elevated at 452 on one occasion 212 and the other sodium 144 on one occasion 54 on the other citrate low at 163 and 122 oxalate low at 12 and 9.0 pH 6.5 and 6.0 magnesium low at 44 and 66.    On questioning it turns out she did her 24-hour urine studies when she was infected and having difficulty with her voiding pattern.    UA today on Macrobid demonstrates specific gravity 1.030 pH 6- blood nitrate and leukocyte.    Patient symptoms have abated and she is not having any  urgency frequency or burning at this time.  She states she is back to her normal voiding pattern of voiding every 3-4 hours with nocturia 0-1 time at most.    Personal review of CT scan without contrast obtained 2/21/2020 demonstrates no evidence of renal or bladder stones. No hydrodonephrosis    Impression recurrent UTI with resistant organism currently on appropriate antibiotic specifically Macrobid.  We will have her maintain Macrobid for period of at least 2 weeks.    Factors regarding stones are extremely low volume hypocitraturia low magnesium hypercalciuria    Plan continue Macrobid 100 p.o. twice daily total of 17 days--keep Macrobid on hand at the moment should she have a recurrence of symptoms   magnesium oxide supplement 200 -400mg  daily increase fluid intake to allow for increased urine output of 2400 cc per 24-hour citrate rich diet,   follow-up UA and 24-hour urine 1 month         ROS   Review of systems is negative except for HPI.    Past Medical History:   Diagnosis Date     Idiopathic Thrombocytopenic Purpura     Created by Conversion      Kidney stone      Varicose vein of leg        Past Surgical History:   Procedure Laterality Date     NO PAST SURGERIES         Current Outpatient Medications   Medication Sig Dispense Refill     nitrofurantoin, macrocrystal-monohydrate, (MACROBID) 100 MG capsule Take 1 capsule (100 mg total) by mouth 2 (two) times a day for 7 days. 14 capsule 0     ibuprofen (ADVIL,MOTRIN) 600 MG tablet Take 1 tablet (600 mg total) by mouth every 6 (six) hours as needed. 28 tablet 0     No current facility-administered medications for this visit.        No Known Allergies    Social History     Socioeconomic History     Marital status:      Spouse name: Not on file     Number of children: Not on file     Years of education: Not on file     Highest education level: Not on file   Occupational History     Occupation:    Social Needs     Financial resource strain: Not on file     Food insecurity:     Worry: Not on file     Inability: Not on file     Transportation needs:     Medical: Not on file     Non-medical: Not on file   Tobacco Use     Smoking status: Never Smoker     Smokeless tobacco: Never Used   Substance and Sexual Activity     Alcohol use: No     Drug use: Not on file     Sexual activity: Not on file   Lifestyle     Physical activity:     Days per week: Not on file     Minutes per session: Not on file     Stress: Not on file   Relationships     Social connections:     Talks on phone: Not on file     Gets together: Not on file     Attends Scientologist service: Not on file     Active member of club or organization: Not on file     Attends meetings of clubs or organizations: Not on file      Relationship status: Not on file     Intimate partner violence:     Fear of current or ex partner: Not on file     Emotionally abused: Not on file     Physically abused: Not on file     Forced sexual activity: Not on file   Other Topics Concern     Not on file   Social History Narrative     Not on file       Family History   Problem Relation Age of Onset     No Medical Problems Daughter      Urolithiasis Neg Hx      Gout Neg Hx      Clotting disorder Neg Hx      Diabetes Neg Hx      Cancer Neg Hx      Heart disease Neg Hx      Objective:      Physical Exam  Vitals:    02/21/20 1504   BP: 111/63   Pulse: (!) 58   Temp: 97.9  F (36.6  C)     General - well developed, well nourished, appropriate for age. Appears no distress at this time  Abdomen - slender soft, non-tender, no hepatosplenomegaly, no masses.   mild left flank discomfort, no suprapubic tenderness, kidney and bladder non-palpable  MSK - normal spinal curvature. no spinal tenderness. normal gait. muscular strength intact.  Psych - oriented to time, place, and person, normal mood and affect.      Labs   Urinalysis POC (Office):  Nitrite, UA   Date Value Ref Range Status   02/21/2020 Negative Negative Final   02/20/2020 Negative Negative Final   02/13/2020 Negative Negative Final       Lab Urinalysis:  Blood, UA   Date Value Ref Range Status   02/21/2020 Negative Negative Final   02/20/2020 Negative Negative Final   02/13/2020 Large (!) Negative Final     Nitrite, UA   Date Value Ref Range Status   02/21/2020 Negative Negative Final   02/20/2020 Negative Negative Final   02/13/2020 Negative Negative Final     Leukocytes, UA   Date Value Ref Range Status   02/21/2020 Negative Negative Final   02/20/2020 Trace (!) Negative Final   02/13/2020 Large (!) Negative Final     pH, UA   Date Value Ref Range Status   02/21/2020 6.0 5.0 - 8.0 Final   02/20/2020 6.0 5.0 - 8.0 Final   02/13/2020 5.0 4.5 - 8.0 Final    and Acute Labs   Urine Culture    Culture   Date  Value Ref Range Status   02/20/2020 No Growth  Final   02/13/2020 >100,000 col/ml ESBL producing Escherichia coli (!)  Final   02/13/2020 50,000-100,000 col/ml Group B Streptococcus (!)  Final

## 2021-06-07 NOTE — TELEPHONE ENCOUNTER
New Appointment Needed  What is the reason for the visit:    Pregnancy Confirmation Appt Needed  When was the first day of your last menstrual cycle?: 03/23/20  Have you done a home pregnancy test?: Yes: 4/15/20.  Provider Preference: Any available  How soon do you need to be seen?: asap  Waitlist offered?: No  Okay to leave a detailed message:  Yes

## 2021-06-07 NOTE — PROGRESS NOTES
Assessment/Plan:        Diagnoses and all orders for this visit:    Hypocitraturia  -     potassium citrate (UROCIT-K) 10 mEq (1,080 mg) SR tablet; 10 meq tabs x 2   bid  Dispense: 360 tablet; Refill: 4  -     Patient Stated Goal: Prevent further stones  -     Patient Increasing Fluids Education  -     Foods Rich in Sodium Salt Education  -     Potassium Citrate Education    History of kidney stones  -     potassium citrate (UROCIT-K) 10 mEq (1,080 mg) SR tablet; 10 meq tabs x 2   bid  Dispense: 360 tablet; Refill: 4  -     Patient Stated Goal: Prevent further stones  -     Patient Increasing Fluids Education  -     Foods Rich in Sodium Salt Education  -     Potassium Citrate Education    High urine sodium  -     Patient Stated Goal: Prevent further stones  -     Patient Increasing Fluids Education  -     Foods Rich in Sodium Salt Education  -     Potassium Citrate Education    Calculus of kidney  -     Patient Stated Goal: Prevent further stones  -     Patient Increasing Fluids Education  -     Foods Rich in Sodium Salt Education  -     Potassium Citrate Education    Hypercalciuria  -     Patient Stated Goal: Prevent further stones  -     Patient Increasing Fluids Education  -     Foods Rich in Sodium Salt Education  -     Potassium Citrate Education    Low urine output  -     Patient Stated Goal: Prevent further stones  -     Patient Increasing Fluids Education  -     Foods Rich in Sodium Salt Education  -     Potassium Citrate Education      Stone Management Plan  [facility] 12/6/2019 2/21/2020 4/7/2020   Urinary Tract Infection No suspicion of infection No suspicion of infection No suspicion of infection   Renal Colic Asymptomatic at this time Asymptomatic at this time Asymptomatic at this time   Renal Failure No suspicion of renal failure No suspicion of renal failure No suspicion of renal failure   Current CT date 11/24/2019 2/21/2020 -   Right sided stones? No - -   R Stone Event No current event No  "current event No current event   Left sided stones? Yes No -   L Number of ureteral stones 1 - -   L GSD of ureteral stones 3 - -   L Location of ureteral stone Distal - -   L Number of kidney stones  No renal stones - -   L GSD of kidney stones N/A - -   L Hydronephrosis Mild - -   L Stone Event New stone passed prior to visit No current event No current event             Phone call duration:17 minutes  357 PM - 414    Chago Adams MD     Subjective:        The patient has been notified of following:     \"This telephone visit will be conducted via a call between you and your physician/provider. We have found that certain health care needs can be provided without the need for a physical exam.  This service lets us provide the care you need with a short phone conversation.  If a prescription is necessary we can send it directly to your pharmacy.  If labs and/or imaging are needed, we can place orders so you can have the test (s) done at a later time.    If during the course of the call the physician/provider feels a telephone visit is not appropriate, you will not be charged for this service.\"     HPI  Ms. Kelly Shannon is a 35 y.o.  female who is being evaluated via a billable telephone visit by [facility] [place] for testing follow-up 24-hour urine having had previous 24-hour urine studies that were inaccurate due to patient collecting urine when she had severe flow and had extremely low output.    Initial serum studies were normal.    Repeat 24-hour urine showed normalization of her volume being 2450 cc.  Much improved from 1105 100 cc.  Urine calcium was still elevated at 382 sodium elevated at 213 citrate continues to be low at 198 but slightly up from 163 and 122.  Oxalate continued to be normal.  Magnesium was now normal whereas before it was distinctly abnormal.    Patient acknowledges increasing her fluid intake and elected not to take the magnesium because she did not want to go " to the drugstore.    She currently is without symptoms having no abdominal or flank pain or voiding symptoms of urgency or frequency.    Impression hypocitraturia hypercalciuria high urinary sodium low volume normalized    Plan continue good fluid intake allowing for urine output of 2500 cc or more, low-sodium diet less than 2500 mg per 24 hours citrate rich fluids such as lemonade or real lemon juice along with potassium citrate 20 mEq p.o. twice daily    Follow-up 24-hour urine with [facility] 6 months.       ROS   Review of systems is negative except for HPI.    Past Medical History:   Diagnosis Date     Idiopathic Thrombocytopenic Purpura     Created by Conversion      Kidney stone      Varicose vein of leg        Past Surgical History:   Procedure Laterality Date     NO PAST SURGERIES         Current Outpatient Medications   Medication Sig Dispense Refill     potassium citrate (UROCIT-K) 10 mEq (1,080 mg) SR tablet 10 meq tabs x 2   bid 360 tablet 4     No current facility-administered medications for this visit.        No Known Allergies    Social History     Socioeconomic History     Marital status:      Spouse name: Not on file     Number of children: Not on file     Years of education: Not on file     Highest education level: Not on file   Occupational History     Occupation:    Social Needs     Financial resource strain: Not on file     Food insecurity     Worry: Not on file     Inability: Not on file     Transportation needs     Medical: Not on file     Non-medical: Not on file   Tobacco Use     Smoking status: Never Smoker     Smokeless tobacco: Never Used   Substance and Sexual Activity     Alcohol use: No     Drug use: Not on file     Sexual activity: Not on file   Lifestyle     Physical activity     Days per week: Not on file     Minutes per session: Not on file     Stress: Not on file   Relationships     Social connections     Talks on phone: Not on file     Gets together: Not  on file     Attends Worship service: Not on file     Active member of club or organization: Not on file     Attends meetings of clubs or organizations: Not on file     Relationship status: Not on file     Intimate partner violence     Fear of current or ex partner: Not on file     Emotionally abused: Not on file     Physically abused: Not on file     Forced sexual activity: Not on file   Other Topics Concern     Not on file   Social History Narrative     Not on file       Family History   Problem Relation Age of Onset     No Medical Problems Daughter      Urolithiasis Neg Hx      Gout Neg Hx      Clotting disorder Neg Hx      Diabetes Neg Hx      Cancer Neg Hx      Heart disease Neg Hx        Objective:        Labs   Stone prevention labs   Serum chemistries   Creatinine   Date Value Ref Range Status   12/06/2019 0.71 0.60 - 1.10 mg/dL Final   11/24/2019 0.71 0.60 - 1.10 mg/dL Final   07/08/2013 0.69 0.60 - 1.10 mg/dL Final     Potassium   Date Value Ref Range Status   11/24/2019 3.5 3.5 - 5.0 mmol/L Final   07/08/2013 4.0 3.5 - 5.0 mmol/L Final   07/19/2011 3.7 3.5 - 5.0 mmol/L Final     Calcium   Date Value Ref Range Status   12/06/2019 9.3 8.5 - 10.5 mg/dL Final   11/24/2019 9.7 8.5 - 10.5 mg/dL Final   07/08/2013 10.1 8.5 - 10.5 mg/dL Final     Phosphorus   Date Value Ref Range Status   12/06/2019 4.1 2.5 - 4.5 mg/dL Final     Uric Acid   Date Value Ref Range Status   12/06/2019 4.9 2.0 - 7.5 mg/dL Final   , Calcium metabolism   Calcium, Ionized Measured   Date Value Ref Range Status   12/06/2019 1.18 1.11 - 1.30 mmol/L Final      PTH   Date Value Ref Range Status   12/06/2019 65 10 - 86 pg/mL Final     No results found for: [id number]  and 24 hour urine   Calcium, 24H Urine   Date Value Ref Range Status   03/29/2020 382 (H) 20 - 275 mg/24hr Final     Comment:     Hypercalciuria >350  Values are for persons with  average daily calcium intake  (600-800 mg/day)   12/13/2019 452 (H) 20 - 275 mg/24hr Final      Comment:     Hypercalciuria >350  Values are for persons with  average daily calcium intake  (600-800 mg/day)   12/08/2019 212 20 - 275 mg/24hr Final     Comment:     Hypercalciuria >350  Values are for persons with  average daily calcium intake  (600-800 mg/day)     Sodium, 24 Hour Urine   Date Value Ref Range Status   03/29/2020 213 40 - 217 mmol/24hr Final   12/13/2019 144 40 - 217 mmol/24hr Final   12/08/2019 54 40 - 217 mmol/24hr Final     Citrate, 24 Hour Urine   Date Value Ref Range Status   03/29/2020 198 (L) 264-1,191 mg/24hr Final     Comment:     Reference Ranges are not  established for 0-19 years  or >60 years of age.   12/13/2019 163 (L) 257-1,191 mg/24hr Final     Comment:     Reference Ranges are not  established for 0-19 years  or >60 years of age.   12/08/2019 122 (L) 257-1,191 mg/24hr Final     Comment:     Reference Ranges are not  established for 0-19 years  or >60 years of age.     Oxalate, 24 Hour Urine   Date Value Ref Range Status   03/29/2020 38.2 7.0 - 44.0 mg/24hr Final   12/13/2019 12.7 7.0 - 44.0 mg/24hr Final   12/08/2019 9.0 7.0 - 44.0 mg/24hr Final     pH, Urine   Date Value Ref Range Status   03/29/2020 6.0 4.5 - 8.0 Final   12/13/2019 6.5 4.5 - 8.0 Final   12/08/2019 6.0 4.5 - 8.0 Final     Urine Volume   Date Value Ref Range Status   03/29/2020 2,450 mL Final   12/13/2019 1,100 mL Final   12/08/2019 550 mL Final

## 2021-06-07 NOTE — PATIENT INSTRUCTIONS - HE
Patient Stated Goal: Prevent further stones  INCREASING FLUIDS TO DECREASE RISK    Low Urinary Volume:     Kidney stones form because there is not enough water to dissolve the concentrated chemicals and minerals in urine.     Studies have found that people who make kidney stones should drink enough fluids so that they produce at least 2 liters (2 quarts) of urine per day.     Studies have shown that virtually every fluid you might drink decreases the risk of stone formation. Acceptable fluids include milk, coffee, diet and regular sodas, alcoholic beverages, fruit juices and even plain old water.    Increasing fluid intake will increase urine volume and decrease the chance of kidney stone formation.    Fluids:    Anything liquid that fits in a glass counts.     One way to gauge if your body has enough fluids is to check the color of your urine. If the urine is dark and yellow you do not have enough fluids. Urine will be pale yellow to clear if your body has enough fluids.    What we need to survive:    Most fluid we drink is lost through evaporation, more if active in hot humid environments    Body wastes can be cleared in a small amount of urine    All fluid that is consumed in excess of necessary losses  dilutes the urine    Ways to Increase Fluids Daily:    Drink more fluids throughout the day and into the evening. (You may need to awake from sleep to urinate which is a good indication of volume status)    Keep your refrigerator stocked with beverages you like    Drink a variety of fluids - mix it up    Add another beverage to your regular beverage at every meal    Keep a beverage at your desk (work area) and finish it before you leave for breaks, meetings, lunch and end of day    Use larger volume glasses    Whenever you pass a water fountain - stop and drink    Drink a beverage with snacks, if it is a salty snack, double the beverage    Take medication with a full glass of water/fluid    Keep a bottle of  water in your car and drink every 20 miles    Eat high water content fruits (melons, oranges, grapes, etc.)    Flavor water with a squeeze of lemon or lime or Crystal Light     If you do something repetitive throughout the day, link it with having something to drink    When you give your child/children something to drink, you have something to drink also    [facility] can respond to your questions or concerns 24 hours a day at [phone].      FOODS RICH IN SODIUM/SALT    If you eat too much salt or sodium, you may increase the calcium in your urine.  That may cause stones to form.  A low sodium/salt diet is recommended to reduce excess urinary calcium excretion that can potentially form a kidney stone.  The recommended sodium intake by [facility] is less than 2500 milligrams per day.     You should usually avoid these items:    ? Salt - One teaspoon of table salt has 2400 milligrams of sodium  ? Processed foods - salt is added in large amounts to some regular foods    ? Examples are:      Canned foods - soups, stews, sauces, gravy mixes, and some vegetables      Frozen foods - dinners, entrees, vegetables with sauces      Snack foods - salted chips, popcorn, pretzels, pork rinds and crackers      Packaged starchy foods - seasoned noodle or rice dishes, stuffing mix, macaroni and cheese dinner      Instant cooking foods to which you add hot water and stir - potatoes, cereals, noodles, etc.       (salt is added to make precooked foods absorb water faster)      Mixes - cornbread, biscuit, cake or pudding      Meats and cheeses  - Deli or lunch meats - bologna, ham, turkey, roast beef, etc.  - Cured or smoked meats - corned beef, stone, sausage of any kind  (doug, link, Kielbasa, Pashto, wieners or hot dogs)  - Canned meats - potted meats, spreads, Yesenia sausage, etc.  - Cheeses - read labels and avoid those with more than 140 mg sodium per serving;  - Examples are:       American cheese      Hajaeta         Cheez Whiz      ? Condiments, Sauces and Seasonings        Mustard, ketchup, salad dressings, bouillon cubes or granules      Sauces - Worcestershire, barbecue, pizza, chili, steak, soy, or horseradish sauce      Meat tenderizer, monosodium glutamate      Any seasoning that has  salt  in the name or on the label;  -  Avoid celery, garlic and onion salt; however use garlic or onion powder or flakes instead       Pickles and olives    What can you use to season you food?         Tart - try lemon or lime juice, vinegar      Hot - peppers are low in sodium; hot sauce has salt, but using just a drop or two will not add        up to much       Herbs and spices - onions, garlic, salt-free seasonings   Potassium Citrate    This medication can be used in two ways to help stone formers.      Citrate in the urine makes it harder for stones to form and grow. Many stone patients do not have enough citrate in their urine and require a supplement.        Citrate is an effective way to make the urine less acidic. Uric acid stones only form when the urine is acidic and may dissolve if urine acidity can be reversed.      USES: This medication is used for low urinary citrate (hypocitraturia), renal tubular acidosis, uric acid stones, calcium oxalate stones, and cysteine stones.    HOW TO USE:     Potassium citrate comes in several different forms      Tablet - made of potassium citrate in a wax substance to allow delayed absorption. It is common to see a  ghost tablet  in the stool after all the potassium citrate has been absorbed. This is more common in people with diarrhea or other bowel disorders.      Powder - this can be dissolved in water or other clear fluids. To decrease stomach irritation and improve absorption, we recommend dissolving in at least 500 cc of fluid and drinking slowly. You do not have to drink this medication all at once. Many patients find the taste is improved by adding a sweetener.      Liquid -  Similar to the powder, the liquid formulation is best dissolved in a large container of fluid and consumed slowly.     SIDE EFFECTS:  The primary side effect of this medication is stomach irritation, less so with the tablet. Some people find stomach irritation decreased by taking the medication with food and avoiding lying down for at least a half hour after consumption.    If you develop diarrhea, nausea, vomiting, stomach pain, fluid retention, convulsions, unusual weakness, mental confusion, tingling or numbness of the hands or feet or other potential side effects, notify your doctor or pharmacist promptly.                    *This is a summary and does not contain all possible information about this product. For complete information about this product or your specific health needs, ask your healthcare professional. Always seek the advice of your healthcare professional if you have any questions about this product or your medical condition.

## 2021-06-08 NOTE — TELEPHONE ENCOUNTER
Please tell patient for next time all OB visit are under OB package so she won't be charged seperetly it will be same as going to office to establish care for pregnancy     Isabel Vaughan MD 5/8/2020 11:52 AM

## 2021-06-08 NOTE — TELEPHONE ENCOUNTER
Spoke with patient via  services. Notified her of the below information and scheduled a nurse only appointment for today.

## 2021-06-08 NOTE — TELEPHONE ENCOUNTER
Reason contacted:  Status update  Information relayed:    Called and spoke with Marguerite   Positive for syphilis and needs penicillin    Dr. Tripp spoke with patient in regards to results and notified her that her  needs to also arrange an appointment to be tested and treated. Per Marguerite Tripp did not contact patient using an  and thinks she had a difficult time understanding these results.     Records will be faxed to me directly for your review.      Additional questions:  No  Further follow-up needed:  Yes  Okay to leave a detailed message:  Yes     Xolair Counseling:  Patient informed of potential adverse effects including but not limited to fever, muscle aches, rash and allergic reactions.  The patient verbalized understanding of the proper use and possible adverse effects of Xolair.  All of the patient's questions and concerns were addressed.

## 2021-06-08 NOTE — TELEPHONE ENCOUNTER
"I called pt to [facility] her for her Video VIsit with Dr. Vaughan today. After reading the disclaimer paragraph; when I asked pt if she gives verbal consent she stated she has many bills and would like to call her insurance provider before she has her visit as she cannot afford more bills.   Pt will call back and reschedule her appt after she has spoke with her Ins Provider.   "

## 2021-06-08 NOTE — TELEPHONE ENCOUNTER
Left message to call back for: nurse only appointment   Information to relay to patient:  Please notify patient her OB provider Dr. Tripp coordinated with Dr. Pereyra to provide penicillin IM injection once weekly for three weeks to treat syphilis.     Can she come into the clinic today sometime before 3:00 pm to receive the first injection?       Please also ensure patient has notified her partner of these results so he schedule an appointment to be tested and treated as well.

## 2021-06-08 NOTE — TELEPHONE ENCOUNTER
Order placed for Bicillin injection once weekly for 3 weeks.  Please call patient to schedule nurse visit.

## 2021-06-08 NOTE — TELEPHONE ENCOUNTER
I will call to determine what STD patient currently has and request records.  With this information would you be able to provide recommended treatment for patient?

## 2021-06-08 NOTE — TELEPHONE ENCOUNTER
Called Dr. Tripp's office to request further recommendations on treatment for syphilis since she is currently pregnant and Dr. Pereyra does not provide OB care.      They will speak with a covering provider and call back with specific dosage and instructions for penicillin IM injection.

## 2021-06-08 NOTE — TELEPHONE ENCOUNTER
What is Dr. Tripp's treatment recommendation?  I do not provide OB care and it looks like this pt is pregnant.  Do we know duration of infection?  Per recommendations on Up to Date, treatment for pregnant patients with unknown duration of infection is Bicillin 2.4 million units IM once weekly for 3 weeks. Does Dr. Tripp agree with this?

## 2021-06-08 NOTE — TELEPHONE ENCOUNTER
I called pt and gave message, she states understanding and will check with her insurance about co-pays and what her coverage is and will call back to schedule.

## 2021-06-08 NOTE — TELEPHONE ENCOUNTER
Provider Communication  Who is calling:  Dr. Ileana Tripp  Facility in which provider is associated:  [facility]  Reason for call:  Dr. Tripp was calling regarding patient.    States they have seen the patient and recently diagnosed her with an STD.  The ob clinic does not have the treatment available so she is wondering if primary care would be able to assist in treating this patient. She would like to discuss logistics with pcp  Urgency for return call:  As able Thursday or Friday   Okay to leave detailed message?:  Yes  If calling on Friday, Dr. Tripp is not in clinic, however nursing staff at [facility] is aware of situation and able to discuss with pcp

## 2021-06-09 NOTE — PROGRESS NOTES
Please see full MFM US report under Epic Imaging tab.     Ishan Navarro MD  Maternal - Fetal Medicine  [facility], [place]

## 2021-06-13 NOTE — ANESTHESIA PREPROCEDURE EVALUATION
Anesthesia Evaluation      Patient summary reviewed   No history of anesthetic complications     Airway   Mallampati: II  Neck ROM: full   Pulmonary - negative ROS and normal exam                          Cardiovascular - negative ROS and normal exam   Neuro/Psych - negative ROS     Endo/Other    (+) pregnant     GI/Hepatic/Renal    (+)   chronic renal disease,           Dental - normal exam                        Anesthesia Plan  Planned anesthetic: spinal    ASA 2     Anesthetic plan and risks discussed with: patient    Post-op plan: routine recovery    Plan spinal anesthetic for primary  section.  Indication is breech presentation.  Pt consented for placement of bilateral TAP blocks at the end of the procedure for postoperative pain control per surgeon request.

## 2021-06-13 NOTE — ANESTHESIA POSTPROCEDURE EVALUATION
Patient: Hlrosalba Mirtha  Procedure(s):  PRIMARY  SECTION  Anesthesia type: spinal    Patient location: Labor and Delivery  Last vitals:   Vitals Value Taken Time   /59 20 1200   Temp 36.5  C (97.7  F) 20 0953   Pulse 68 20 1145   Resp 16 20 1145   SpO2 97 % 20 1145     Post vital signs: stable  Level of consciousness: awake and responds to simple questions  Post-anesthesia pain: pain controlled  Post-anesthesia nausea and vomiting: no  Pulmonary: unassisted, return to baseline  Cardiovascular: stable and blood pressure at baseline  Hydration: adequate  Anesthetic events: no    QCDR Measures:  ASA# 11 - Shara-op Cardiac Arrest: ASA11B - Patient did NOT experience unanticipated cardiac arrest  ASA# 12 - Shara-op Mortality Rate: ASA12B - Patient did NOT die  ASA# 13 - PACU Re-Intubation Rate: NA - No ETT / LMA used for case  ASA# 10 - Composite Anes Safety: ASA10A - No serious adverse event    Additional Notes:  Recovery as anticipated from spinal anesthetic.  No apparent complications.

## 2021-06-13 NOTE — ANESTHESIA PROCEDURE NOTES
Peripheral Block    Patient location during procedure: OR  Start time: 12/11/2020 9:33 AM  End time: 12/11/2020 9:38 AM  post-op analgesia per surgeon order as noted in medical record  Preanesthetic Checklist  Completed: patient identified, site marked, risks, benefits, and alternatives discussed, timeout performed, consent obtained, airway assessed, oxygen available, suction available, emergency drugs available and hand hygiene performed  Peripheral Block  Block type: other, TAP  Prep: ChloraPrep  Patient position: supine  Patient monitoring: cardiac monitor, continuous pulse oximetry, heart rate and blood pressure  Laterality: bilateral  Injection technique: ultrasound guided    Ultrasound used to visualize needle placement in proximity to nerve being blocked: yes   US used to visualize anesthetic spread  Visualized anatomic structures normal  No Pathological Findings  Permanent ultrasound image captured for medical record  Sterile gel and probe cover used for ultrasound.  Needle  Needle type: Stimuplex   Needle gauge: 20G  Needle length: 4 in    Assessment  Injection assessment: no difficulty with injection, negative aspiration for heme, no paresthesia on injection and incremental injection

## 2021-06-13 NOTE — ANESTHESIA CARE TRANSFER NOTE
Last vitals:   Vitals:    12/11/20 0953   BP: 113/69   Pulse: 80   Resp: 12   Temp: 36.5  C (97.7  F)   SpO2: 100%     Patient's level of consciousness is awake  Spontaneous respirations: yes  Maintains airway independently: yes  Dentition unchanged: yes  Oropharynx: oropharynx clear of all foreign objects    QCDR Measures:  ASA# 20 - Surgical Safety Checklist: WHO surgical safety checklist completed prior to induction    PQRS# 430 - Adult PONV Prevention: 4558F - Pt received => 2 anti-emetic agents (different classes) preop & intraop  ASA# 8 - Peds PONV Prevention: NA - Not pediatric patient, not GA or 2 or more risk factors NOT present  PQRS# 424 - Shara-op Temp Management: 4559F - At least one body temp DOCUMENTED => 35.5C or 95.9F within required timeframe  PQRS# 426 - PACU Transfer Protocol: - Transfer of care checklist used  ASA# 14 - Acute Post-op Pain: ASA14B - Patient did NOT experience pain >= 7 out of 10

## 2021-06-13 NOTE — ANESTHESIA PROCEDURE NOTES
Spinal Block    Patient location during procedure: OR  Start time: 12/11/2020 8:36 AM  End time: 12/11/2020 8:40 AM  Reason for block: primary anesthetic    Staffing:  Performing  Anesthesiologist: Oscar Porras MD    Preanesthetic Checklist  Completed: patient identified, risks, benefits, and alternatives discussed, timeout performed, consent obtained, airway assessed, oxygen available, suction available, emergency drugs available and hand hygiene performed  Spinal Block  Patient position: sitting  Prep: ChloraPrep  Patient monitoring: heart rate, cardiac monitor, continuous pulse ox and blood pressure  Approach: midline  Location: L2-3  Injection technique: single-shot  Needle type: pencil-tip   Needle gauge: 24 G

## 2021-06-13 NOTE — TELEPHONE ENCOUNTER
OB Follow Up Phone Call    Patient: Kelly Shannon  : 1985  MRN: [id number]     Location  [facility]  Provider Karen Pereyra MD    :   N/A    Language:   Hmong    Discharge Follow-Up:  Follow-Up call by Outreach nurse: Call placed    Type of Delivery:      Feeding Method:  Formula    Feedings in 24Hrs:  >8x/Day    Breast engorgement:   No    Nipple tenderness:   No    Non-nursing engorgement discussed:   N/A    Amount of Feedin-3 oz    Number of Infant Stools in 24 hours:   >3    Stool:  Transition    Number of Infant voids in 24 hours:  >3    Urine:  Clear    Infant Jaundice:   Facial    Discussed increasing s/s of Jaundice:   Yes    Circumcision:   N/A    Maternal s/s of infection:   None    Maternal s/s of PIH:   WDL    Postpartum cramping:   Mild    Comfort:  Adequate with routine pain meds    Vaginal Bleeding:  WDL    S/S of Maternal Thrombosis:  None    Maternal BM Since Delivery:  Yes    Referrals:   None    Resources Used During Phone Call:  HEPS    Comments:   I spoke with pt/ and they state that things are going well overall at home. She is aleksander feeding 2-3 oz per feeding. Denies concerns with jaundice. Voiding/stooling. NB follow-up apt scheduled for the family for Tues, Dec 15th with Dr Alvares. Parents deny any other questions or concerns at this time.     Completed by:   Layla Starks RN      Patient: Kelly Shannon  : 1985  MRN: [id number]

## 2021-06-13 NOTE — TELEPHONE ENCOUNTER
OB Follow Up Phone Call    Patient: Kelly Shannon  : 1985  MRN: [id number]     Location  [facility]  Provider Karen Pereyra MD    :   N/A    Language:   English    Discharge Follow-Up:  Follow-Up call by Outreach nurse: Call placed    Type of Delivery:      Feeding Method:  Formula    Feedings in 24Hrs:  >8x/Day    Breast engorgement:   No    Nipple tenderness:   No    Non-nursing engorgement discussed:   N/A    Amount of Feedin-3 oz    Number of Infant Stools in 24 hours:   >3    Stool:  Transition    Number of Infant voids in 24 hours:  >3    Urine:  Clear    Infant Jaundice:   Facial    Discussed increasing s/s of Jaundice:   Yes    Circumcision:   N/A    Maternal s/s of infection:   None    Maternal s/s of PIH:   WDL    Postpartum cramping:   Mild    Comfort:  Adequate with routine pain meds    Vaginal Bleeding:  WDL    S/S of Maternal Thrombosis:  None    Maternal BM Since Delivery:  Yes    Referrals:   None    Resources Used During Phone Call:  HEPS    Comments:   Spoke with pt/ and they state that things are going well at Home. Kelly denies any concerns for herself. Edu discussed. Questions answered.    Completed by:   Layla Starks RN      Patient: Kelly Shannon  : 1985  MRN: [id number]

## 2021-06-13 NOTE — PROGRESS NOTES
Assessment:        1. Health care maintenance  Gynecologic Cytology (PAP Smear)    Lipid Cascade FASTING    Glucose    Hemoglobin   2. Need for immunization against influenza  Influenza, Seasonal,Quad Inj, 36+ MOS   3. Screening for STD (sexually transmitted disease)  Chlamydia trachomatis & Neisseria gonorrhoeae, Amplified Detection   4. Constipation     5. Impaired fasting glucose  Glycosylated Hemoglobin A1c       Plan:      1. Health maintenance: Pap smear performed.  Will check fasting lipids, glucose and hemoglobin.  Influenza vaccine administered.  Second dose of hepatitis A vaccine and third dose of hepatitis B vaccine also administered.  Counseled on vaccines and common side effects.  Encouraged regular exercise and healthy diet.  Follow-up in 1 year.  2. STD screen: Probe collected for gonorrhea and Chlamydia testing.  3. Constipation: Encouraged adequate fiber and fluids.  Encouraged regular exercise.  May use over-the-counter fiber supplement, MiraLAX or stool softener as needed  4. Impaired fasting glucose: Fasting glucose elevated at 114.  A1c also ordered.          Subjective:      Kelly Shannon is a 32 y.o. female who presents for an annual exam.  She is accompanied today by her .  She speaks limited English and  assists with interpretation.  Health history is reviewed.  Overall healthy individual.  No allergies.  No current medications.  Reviewed family history, social history and updated the chart.  No prior surgeries.  On review of systems, she has some occasional constipation.  Uses an over-the-counter stool softener as needed.  States that she feels she drinks adequate water.  She does not eat a lot of fruits but does eat vegetables.  She does not exercise regularly.  Review of systems is otherwise negative.  No other concerns or questions today.    Healthy Habits:   Regular Exercise: No  Sunscreen Use: Yes  Healthy Diet: Yes  Dental Visits Regularly: Yes  Seat Belt: Yes  Sexually  active: Yes  Self Breast Exam Monthly:Yes  Hemoccults: N/A  Flex Sig: N/A  Colonoscopy: N/A  Lipid Profile: Yes  Glucose Screen: Yes  Prevention of Osteoporosis: Yes  Last Dexa: N/A  Guns at Home:  N/A      Immunization History   Administered Date(s) Administered     HPV Quadrivalent 10/01/2008     Hep A, historic 10/01/2008     Hep B, historic 10/01/2008, 11/05/2008     HiB, historic 08/09/2011     Influenza, inj, historic 10/01/2008, 10/26/2010, 10/02/2011, 10/05/2012, 10/02/2013, 10/21/2014, 10/20/2015     Influenza, seasonal,quad inj 36+ mos 12/02/2016     Pneumo Polysac 23-V 08/09/2011     Tdap 10/01/2008     Typhoid, historic 10/01/2008     Immunization status: due today.      Gynecologic History  Patient's last menstrual period was 09/27/2017 (approximate).  Contraception: none  Last Pap: 2013. Results were: normal        OB History   No data available       No current outpatient prescriptions on file.     No current facility-administered medications for this visit.      No past medical history on file.  No past surgical history on file.  Review of patient's allergies indicates no known allergies.  Family History   Problem Relation Age of Onset     No Medical Problems Daughter      Social History     Social History     Marital status:      Spouse name: N/A     Number of children: N/A     Years of education: N/A     Occupational History     Not on file.     Social History Main Topics     Smoking status: Never Smoker     Smokeless tobacco: Never Used     Alcohol use No     Drug use: Not on file     Sexual activity: Not on file     Other Topics Concern     Not on file     Social History Narrative       Review of Systems  General:  Denies problem  Eyes: Denies problem  Ears/Nose/Throat: Denies problem  Cardiovascular: Denies problem  Respiratory:  Denies problem  Gastrointestinal:  occasional constipation   Genitourinary: Denies problem  Musculoskeletal:  Denies problem  Skin: Denies problem  Neurologic:  "Denies problem  Psychiatric: Denies problem  Endocrine: Denies problem  Heme/Lymphatic: Denies problem   Allergic/Immunologic: Denies problem        Objective:         /58 (Patient Site: Right Arm, Patient Position: Sitting, Cuff Size: Adult Regular)  Pulse 63  Temp 97.7  F (36.5  C) (Tympanic)   Ht 5' 1.5\" (1.562 m)  Wt 124 lb 7 oz (56.4 kg)  LMP 09/27/2017 (Approximate)  SpO2 98%  Breastfeeding? No  BMI 23.13 kg/m2      Physical Exam:  General Appearance: Alert, cooperative, no distress, appears stated age  Head: Normocephalic, without obvious abnormality, atraumatic  Eyes: PERRL, conjunctiva/corneas clear, EOM's intact  Ears: Normal TM's and external ear canals, both ears  Nose: Nares normal, septum midline,mucosa normal, no drainage  Throat: Lips, mucosa, and tongue normal; teeth and gums normal  Neck: Supple, symmetrical, trachea midline, no adenopathy;  thyroid: not enlarged, symmetric, no tenderness/mass/nodules;  Back: Symmetric, no curvature, ROM normal,   Lungs: Clear to auscultation bilaterally, respirations unlabored  Breasts: No breast masses, tenderness, asymmetry, or nipple discharge.  Heart: Regular rate and rhythm, S1 and S2 normal, no murmur, rub, or gallop, Abdomen: Soft, non-tender, bowel sounds active all four quadrants,  no masses, no organomegaly  Pelvic:Normally developed genitalia with no external lesions or eruptions. Vagina and cervix show no lesions, inflammation, discharge or tenderness. No cystocele, No rectocele. Uterus normal.  No adnexal mass or tenderness.  Extremities: Extremities normal, atraumatic, no cyanosis or edema  Skin: Skin color, texture, turgor normal, no rashes or lesions  Lymph nodes: Cervical, supraclavicular, and axillary nodes normal  Neurologic: Normal      "

## 2021-06-13 NOTE — OP NOTE
ECV    Patient presents for external cephalic version. Consent was obtained. Reactive NST was obtained. She was given subcutaneous terbutaline. U/s gel was applied to the abdomen. Applying pressure to the abdomen, attempt was made to lift the fetal rump up from the pelvis while simultaneously pushing the head in a clockwise motion. Steady pressure was applied for several minutes. U/s was then reapplied and fetus was noted to be in the same joshua breech position. A second attempt was made with the clockwise rotation. After that was unsuccessful, attempt was made to rotate counter clockwise. Unfortunately, this was not successful either. Patient tolerated the procedure well with moderate discomfort. Fetal heart monitors were reapplied to obtain NST. Discussed plan to schedule c/s next week. Also reviewed precautions for labor, SROM, etc.

## 2021-06-14 NOTE — PROGRESS NOTES
Assessment/Plan:     1. Counseling about travel         Diagnoses and all orders for this visit:    Counseling about travel  Reviewed CDC recommendations for travel to [facility] and [facility].  She is up-to-date on routine vaccinations.  She had influenza vaccine already this year.  She has completed the hepatitis A and hepatitis B vaccine series.  Her tetanus vaccine is up-to-date.  Malaria prophylaxis is recommended.  Provided prescription for Malarone.  Directed her to take daily starting 2 days before travel and continuing until 7 days after her return to the [place].  Recommend typhoid vaccine and prescription was sent to pharmacy.  She was counseled on use of this vaccine.  She was also provided with a prescription for Cipro to use as needed for traveler's diarrhea.  Discussed importance of use of sunscreen and insect repellent and other safe travel practices.    Other orders  -     ciprofloxacin HCl (CIPRO) 500 MG tablet; Take 1 tablet (500 mg total) by mouth 2 (two) times a day for 3 days. Take as needed for traveler's diarrhea  Dispense: 6 tablet; Refill: 0  -     atovaquone-proguanil (MALARONE) 250-100 mg Tab; Start 2 days before travel and continue until 7 days after you return to [place]  Dispense: 34 tablet; Refill: 0  -     typhoid (VIVOTIF) SR capsule; Take 1 capsule by mouth every other day.  Dispense: 4 capsule; Refill: 0               Subjective:      Kelly Shannon is a 32 y.o. female who comes in today for travel consultation.  She is accompanied by her .  She will be traveling to [facility] and [facility] to visit family.  She will be traveling December 3-28.  Primarily will be in urban areas but will occasionally travel to more rural areas.  She was recently seen for physical examination.  She has no other concerns or questions today.  Vaccines are up-to-date.  She takes no regular medications.  She has no allergies.  She has no other concerns or questions today.    Current Outpatient Prescriptions    Medication Sig Dispense Refill     atovaquone-proguanil (MALARONE) 250-100 mg Tab Start 2 days before travel and continue until 7 days after you return to [place] 34 tablet 0     ciprofloxacin HCl (CIPRO) 500 MG tablet Take 1 tablet (500 mg total) by mouth 2 (two) times a day for 3 days. Take as needed for traveler's diarrhea 6 tablet 0     typhoid (VIVOTIF) SR capsule Take 1 capsule by mouth every other day. 4 capsule 0     No current facility-administered medications for this visit.        Past Medical History, Family History, and Social History reviewed.  No past medical history on file.  No past surgical history on file.  Review of patient's allergies indicates no known allergies.  Family History   Problem Relation Age of Onset     No Medical Problems Daughter      Social History     Social History     Marital status:      Spouse name: N/A     Number of children: N/A     Years of education: N/A     Occupational History     Not on file.     Social History Main Topics     Smoking status: Never Smoker     Smokeless tobacco: Never Used     Alcohol use No     Drug use: Not on file     Sexual activity: Not on file     Other Topics Concern     Not on file     Social History Narrative         Review of systems is as stated in HPI, and the remainder of the 10 system review is otherwise negative.    Objective:     Vitals:    11/17/17 0907   BP: 110/64   Patient Site: Left Arm   Patient Position: Sitting   Cuff Size: Adult Regular   Pulse: (!) 58   Temp: 98.1  F (36.7  C)   TempSrc: Tympanic   SpO2: 98%   Weight: 128 lb 1 oz (58.1 kg)    Body mass index is 23.81 kg/(m^2).    General appearance: alert, appears stated age and cooperative  Neurologic: Grossly normal  Psych: mood appropriate, affect normal          This note has been dictated using voice recognition software. Any grammatical or context distortions are unintentional and inherent to the the software.

## 2021-06-16 PROBLEM — A52.8 LATE LATENT SYPHILIS: Status: ACTIVE | Noted: 2020-12-11

## 2021-06-16 PROBLEM — O36.5930 POOR FETAL GROWTH AFFECTING MANAGEMENT OF MOTHER IN THIRD TRIMESTER: Status: ACTIVE | Noted: 2020-12-11

## 2021-06-17 NOTE — PATIENT INSTRUCTIONS - HE
"Patient Instructions by Chris Sprague RN at 2/20/2019 11:00 AM     Author: Chris Sprague RN Service: -- Author Type: Registered Nurse    Filed: 2/20/2019 11:29 AM Encounter Date: 2/20/2019 Status: Addendum    : Chris Sprague RN (Registered Nurse)    Related Notes: Original Note by Chris Sprague RN (Registered Nurse) filed at 2/20/2019 11:23 AM       We are prescribing some compression stockings for you. I have included different suppliers that should help you get measured and fitting to ensure proper fitting socks. You should wear this socks as much as you can. It is especially important to wear them with long periods of sitting/standing, long car rides or if you will be flying. Compression socks should get refilled every 4-6 months. They do not need to be worn at night while in bed.    If you do a lot of standing it is good to do calf raises to help keep the blood pumping. If you sit a lot at work it is good to get up periodically to walk around. Elevation of the foot of your bed 4-6\" helps the blood return back to where it is needed.    We would like you to follow up in 3 months after wearing socks to see how you are doing.    Varicose Veins      Varicose veins are swollen, enlarged veins most often found in the legs. They are usually blue or purple in color and may bulge, twist, and stand out under the skin.  Normally, veins return blood from the body to the heart. The leg veins have one-way valves that prevent blood from flowing backward in the vein. When the valves are weak or damaged, blood backs up in the veins. This may cause some of the veins to swell and bulge and become varicose veins.  Symptoms  Varicose veins may or may not cause symptoms. If symptoms do occur, they can include:    Legs that feel tired, achy, heavy, or itchy    Leg muscle cramps    Skin changes, such as discoloration, dryness, redness, or rash (in more severe cases, you may also have sores on the skin called venous leg " ulcers)  Risk Factors  There are a number of factors that increase the risk for varicose veins. These can include:    Being a woman    Being older    Sitting or standing for long periods    Being overweight    Being pregnant    Having a family history of varicose veins  Treatment begins with simple self-help measures (see below). If these dont help, there are many procedures that can be done to shrink or remove varicose veins. Your healthcare provider can tell you more about these options, if needed.  Home care    Support or compression stockings will likely be prescribed. If so, be sure to wear them as directed. They may help improve blood flow.    Exercising helps strengthen your leg muscles and improve blood flow. To get the most benefit, choose exercises such as walking, swimming, or cycling. Also try to exercise for at least 30 minutes on most days.    Raising (elevating) your legs lets gravity help blood flow back to the heart. Sit or lie with your feet above heart level a few times throughout the day, or as directed.    Avoid long periods of sitting or standing. Change positions often. Also, move your ankles, toes and knees often. This may also help improve blood flow.    If you are overweight, talk with your healthcare provider about setting up a weight-loss plan. Maintaining a healthy weight can help reduce the strain on your veins. It may also improve symptoms, such as swelling and aching.    If you have dryness and itching, ask your provider about special lotions that can be applied to the skin to help improve symptoms.  Follow-up care  Follow up with your healthcare provider, or as directed. If imaging tests were done, youll be told the results and if there are any new findings that affect your care.  When to seek medical advice  Call your healthcare provider right away if any of these occur:    Sudden, severe leg swelling, pain, or redness    Symptoms worsen, or they dont improve with  self-care    Bleeding from any affected veins    Ulcers form on the legs, ankles, or feet    Fever of 100.4 F (38 C) or higher, or as advised by your provider      Understanding Spider and Varicose Veins  Do you often hide your legs because of the way they look? You may have noticed tiny red or blue bursts (spider veins). Or maybe you have veins that bulge or look twisted (varicose veins). If so, there are treatments that can help  What are the symptoms?  Spider veins or varicose veins may never be a problem. But sometimes they can cause legs to ache or swell. Your legs may also feel heavy and tired, or like theyre burning. These symptoms may be more severe at the end of the day. Prolonged sitting or standing can also make your symptoms worse.  Who gets spider and varicose veins?  Anyone can get spider or varicose veins. But vein problems tend to be hereditary (run in families). Other factors that can affect veins include:    Pregnancy, hormones, and birth control pills    A job where you stand or sit a lot    Extra weight or lack of exercise    Age         Spider veins look like tiny webs on the ankles, legs, and upper thighs.       Ropy, dark blue, red, or flesh-colored varicose veins are most common on the thighs, calves, and feet.    What can be done?  Spider and varicose veins can affect the way you feel about yourself. Talk to your healthcare provider about your concerns. There are treatments that can ease symptoms and make your legs look better.  Your treatment choices  Treatment may include self-care, sclerotherapy (injecting veins with a chemical), surgery, or newer nonsurgical minimally invasive therapies. Spider veins and some varicose veins can be treated with sclerotherapy. Large varicose veins can often be treated with newer minimally invasive procedures and, in rare cases, surgery may be needed.     Please call [facility] to schedule an appointment. If you received a  prescription please bring it with you to your appointment. You may call one of the locations below, although some locations are limited to what they carry.    Office Locations  New Locations  [facility]  Home Medical Equipment  [address], [place] [address]  Orthotics and Prosthetics ([facility])  [address], [place] [address]  Phone [phone] /Fax [phone]        [facility]   [address]   Medical Equipment [address]/Orthotics and Prosthetics [address]  [place] [address]   Phone: [phone]  Fax [phone]    [facility]  [address]  [address]  [place] [address]  Phone: [phone]  Fax: [phone]    [facility] Medical [facility]ld[facility]   [address]  [place] [address]  Phone: [phone]  Fax: [phone]    [facility] Professional [facility]ld[facility]  [address]  [place] [address]  Phone: [phone]  Fax: [phone]    [facility]  [address]  [address]  [place] [address]  Phone: [phone]  Fax: [phone]    [facility]  [address]  [address]   [place] [address]   Phone: [phone]  Fax: [phone]    [place]   [address] [place] [address]   Phone: [phone]  Fax: [phone]    [facility]  [address]  [place] [address]    [place] [phone]  [phone]  [phone]  [place] [phone]  [email or web address]      [facility]   [address]             [address]                 [address]     [place] [address]      [place] [address]         [place], [place] [id number] [phone]                        [phone]  [phone]     [phone]               Fax: [phone]  [email or web address]                                                     [facility]  [address] [place] [address]  [phone]  [phone]  [email or web address]    Jhon Spencer  [phone]  [employer] [facility]   [phone]    [facility]  [address] [place] [address]    [phone]  Laser Vein Therapy Information    Laser vein therapy uses a laser to diminish the appearance of spider veins quickly, safely and effectively without injections. It works by delivering pulses of light energy causing the blood within the vein to gel, destroying the vessel, which is reabsorbed by your body.      Laser vein therapy is ideal for small facial veins, spider veins and blue leg veins. Laser vein therapy is generally performed on the legs and face.    We recommend at least two to three treatments. The number necessary depends on the number, color and size of the vessels being treated.  Treatment is given by a nurse certified in laser therapy. She has received special training in diminishing the appearance of spider and facial veins.  Please no use of self-tanners before treatments.    What to expect during the procedure:    You will be given protective eyewear for the laser treatment.    You will feel a cold sensation over the area to be treated.    When the laser shines on the skin, you will experience a stinging sensation.    There may be some minor discomfort immediately following treatment.    You may have some redness and slight bruising.    The majority of the treated veins show improvement within two to six weeks of treatment. However, your final results may not be apparent for several months.       Over time it is possible for new veins to appear. These new veins can be treated as needed.    Recommendations after treatment    Wear SPF 30 or greater on  all treated areas that are exposed to the sun.    Do not exercise heavily for the two to three days following treatment.    Wear compression for the two to three days following treatment.    To help reduce swelling and redness, apply cold packs to the area as needed for five to ten minutes every one to two hours after treatment.    Acetaminophen may be used for discomfort if necessary.    Post-inflammatory hyperpigmentation is common and tends to resolve over time.    The area might be raised after treatment. This will resolve over time.    Do not soak in a warm bath, sauna or hot tub for the next two to three days following treatment.    Do not have laser therapy if you:    Are pregnant or breastfeeding.    Have an active cold sore    Follow Up    It is necessary to wait at least four to six weeks between treatments.    We recommend at least three to four treatments for optimal results.    Please call if you have any questions.      SCLEROTHERAPY  Dr. Estrella Munoz  [phone]

## 2021-06-20 ENCOUNTER — HEALTH MAINTENANCE LETTER (OUTPATIENT)
Age: 36
End: 2021-06-20

## 2021-06-24 NOTE — PROGRESS NOTES
Assessment/Plan:     1. Varicose veins of both lower extremities, unspecified whether complicated  Ambulatory referral to Vascular Center       Diagnoses and all orders for this visit:    Varicose veins of both lower extremities, unspecified whether complicated  -     Ambulatory referral to Vascular Center  -Referral placed to vascular center for further evaluation and treatment    Need for tetanus vaccine  -     Td, Preservative Free (green label)  -counseled on vaccine and side effects               Subjective:      Kelly Shannon is a 33 y.o. female who comes in today with concern about varicose veins.  She is seen with the assistance of an .  States that she has had some small spider veins present on her right thigh for several years.  Over the past year or so she has noticed development of more superficial spider veins as well as a varicose vein near her left knee.  She does have some discomfort associated with them.  She does not explains any swelling in her legs or feet.  She does not feel any fatigue or achiness in her legs.  She is concerned about the progression of the veins as well as the appearance and the discomfort she is experiencing and is interested in exploring options for treatment.  She has watched videos on YouTube on treatments that are done for varicose veins.  We reviewed her medications and allergies.  She is otherwise doing well and has no other concerns or questions.  Review of systems is assessed and otherwise negative.    No current outpatient medications on file.     No current facility-administered medications for this visit.        Past Medical History, Family History, and Social History reviewed.  No past medical history on file.  No past surgical history on file.  Patient has no known allergies.  Family History   Problem Relation Age of Onset     No Medical Problems Daughter      Social History     Socioeconomic History     Marital status:      Spouse name: Not on  file     Number of children: Not on file     Years of education: Not on file     Highest education level: Not on file   Social Needs     Financial resource strain: Not on file     Food insecurity - worry: Not on file     Food insecurity - inability: Not on file     Transportation needs - medical: Not on file     Transportation needs - non-medical: Not on file   Occupational History     Not on file   Tobacco Use     Smoking status: Never Smoker     Smokeless tobacco: Never Used   Substance and Sexual Activity     Alcohol use: No     Drug use: Not on file     Sexual activity: Not on file   Other Topics Concern     Not on file   Social History Narrative     Not on file         Review of systems is as stated in HPI, and the remainder of the 10 system review is otherwise negative.    Objective:     Vitals:    02/15/19 0904   BP: 102/60   Patient Site: Left Arm   Patient Position: Sitting   Cuff Size: Adult Regular   Pulse: 99   SpO2: 97%   Weight: 128 lb 5 oz (58.2 kg)    Body mass index is 23.85 kg/m .    General appearance: alert, appears stated age and cooperative  Head: Normocephalic, without obvious abnormality, atraumatic  Extremities: extremities normal, atraumatic, no cyanosis or edema, varicose veins noted and spider veins noted bilateral lower extremities          This note has been dictated using voice recognition software. Any grammatical or context distortions are unintentional and inherent to the the software.

## 2021-06-27 NOTE — PROGRESS NOTES
Progress Notes by Sanchez Lovett MD at 2/20/2019 11:00 AM     Author: Sanchez Lovett MD Service: -- Author Type: Physician    Filed: 3/3/2019  4:21 PM Encounter Date: 2/20/2019 Status: Signed    : Sanchez Lovett MD (Physician)       [facility] Vein Consult      Assessment:     1. varicose veins, bilateral   2. spider veins, bilateral     Plan:     1. Treatment options of conservative therapy of stockings use, exercise, weight loss,  elevating legs when possible.    2. Script for compression stockings 20-30 mm hg  3. Ultrasound to evaluate legs for incompetency of both deep and superficial system .   4. Surgical treatment, discussed briefly  5. Follow up: 3 months.   6. Call for any questions concerns or issues    Subjective:      Kelly Shannon is a 34 y.o. female  who was referred by Karen Pereyra MD  for evaluation of varicose veins. Symptoms include pain, aching, fatigue, burning, edema and dermatitis. Patient has history of leg selling, pain and vein issues that have progressed. Pain and symptoms have affected daily living and work activities needing medications. Here for evaluation today. no stocking or compression devic use    Allergies:Patient has no known allergies.    Past Medical History:   Diagnosis Date   ? Idiopathic Thrombocytopenic Purpura     Created by Conversion    ? Varicose vein of leg        History reviewed. No pertinent surgical history.    No current outpatient medications on file.       Family History   Problem Relation Age of Onset   ? No Medical Problems Daughter         reports that  has never smoked. she has never used smokeless tobacco. She reports that she does not drink alcohol.      Review of Systems  Pertinent items are noted in HPI.  A 12 point comprehensive review of systems was negative except as noted.      Objective:     Vitals:    02/20/19 1103   BP: 100/60   Pulse: 72   Resp: 12   Temp: 97.7  F (36.5  C)   TempSrc: Oral   Weight: 131 lb 4.8 oz (59.6 kg)  "  Height: 5' 2\" (1.575 m)     Body mass index is 24.02 kg/m .    EXAM:  GENERAL: This is a well-developed 34 y.o. female who appears her stated age  HEAD: normocephalic  HEENT: Pupils equal and reactive bilaterally  MOUTH: mucus membranes intact. Normal dentation  CARDIAC: RRR without murmur  CHEST/LUNG:  Clear to auscultation bilaterally  ABDOMEN: Soft, nontender, nondistended, no masses noted   NEUROLOGIC: Focally intact, nonfocal, alert and oriented x 3  INTEGUMENT: No open lesions or ulcers  VASCULAR: Pulses intact, symmetrical upper and lower extremities. There areskin changes consistent with chronic venous insufficiency. Varicose veins present in bilateral greater saphenous distribution. Spider veins present bilateral.                Imaging:  pending    Sanchez Lovett MD  [facility] Surgery Dept.             "

## 2021-06-29 NOTE — PROGRESS NOTES
Progress Notes by Rinku Barry PA-C at 4/29/2020  9:30 AM     Author: Rinku Barry PA-C Service: -- Author Type: Physician Assistant    Filed: 5/6/2020  9:33 AM Encounter Date: 4/29/2020 Status: Signed    : Rinku Barry PA-C (Physician Assistant)       Subjective:      Patient ID: Kelly Shannon is a 35 y.o. female.    Chief Complaint:    HPI  Kelly Shannon is a 35 y.o. female who presents today complaining of three day history of left lower quadrant abdominal pain.  Patient states that she is taken 2 at home pregnancy tests and already were confirmed as positive.  She is requesting to get a pregnancy test in the office today to confirm those.  He is unable to recall her first day of her last menstrual period.  Patient states that in addition to her concern for pregnancy she did have vaginal bleeding.  She describes that there has been some gross hematuria but also independent of her urinating she did have blood into the undergarments that she states came from her vagina.  At this time she is not having gross irritative voiding symptoms to include urinary frequency or dysuria.  She has not had fever chills night sweats fatigue vomiting diarrhea syncope presyncope dizziness or heart palpitations.     She has not tried treatment for this at home.    LMP   Past Medical History:   Diagnosis Date   ? Idiopathic Thrombocytopenic Purpura     Created by Conversion    ? Kidney stone    ? Varicose vein of leg        Past Surgical History:   Procedure Laterality Date   ? NO PAST SURGERIES         Family History   Problem Relation Age of Onset   ? No Medical Problems Daughter    ? Urolithiasis Neg Hx    ? Gout Neg Hx    ? Clotting disorder Neg Hx    ? Diabetes Neg Hx    ? Cancer Neg Hx    ? Heart disease Neg Hx        Social History     Tobacco Use   ? Smoking status: Never Smoker   ? Smokeless tobacco: Never Used   Substance Use Topics   ? Alcohol use: No   ? Drug use: Not on file       Review of Systems   Constitutional:  Negative for activity change, appetite change, chills, diaphoresis, fatigue and fever.   HENT: Negative for facial swelling and sore throat.    Eyes: Negative for visual disturbance.   Respiratory: Negative for chest tightness and shortness of breath.    Cardiovascular: Negative for chest pain, palpitations and leg swelling.   Gastrointestinal: Positive for abdominal pain (left lower quadrant). Negative for blood in stool, diarrhea, nausea and vomiting.   Endocrine: Negative for polyuria.   Genitourinary: Positive for hematuria, vaginal bleeding and vaginal discharge. Negative for difficulty urinating, dysuria, flank pain, frequency, urgency and vaginal pain.   Musculoskeletal: Negative for neck pain and neck stiffness.   Skin: Negative for rash.   Neurological: Negative for dizziness, syncope and headaches.   Hematological: Does not bruise/bleed easily.   Psychiatric/Behavioral: Negative for confusion.         Objective:     /66   Pulse 71   Temp 98.1  F (36.7  C)   Resp 15   Wt 134 lb (60.8 kg)   LMP 03/24/2020 (Approximate)   SpO2 100%   Breastfeeding No   BMI 25.32 kg/m      Physical Exam  General: Patient is resting comfortably no acute distress is afebrile  HEENT: Head is normocephalic atraumatic   eyes are PERRL EOMI sclera anicteric   TMs are clear bilaterally  Throat is with mild pharyngeal wall erythema and no exudate  No cervical lymphadenopathy present  LUNGS: Clear to auscultation bilaterally  HEART: Regular rate and rhythm  Abdomen: left lower quadrant abdominal pain to palpation  No CVA tenderness to percussion  Skin: Without rash non-diaphoretic    Lab:  Recent Results (from the past 24 hour(s))   Pregnancy, Urine   Result Value Ref Range    Pregnancy Test, Urine Positive (!) Negative   Urinalysis-UC if Indicated   Result Value Ref Range    Color, UA Yellow Colorless, Yellow, Straw, Light Yellow    Clarity, UA Clear Clear    Glucose, UA Negative Negative    Bilirubin, UA Negative  Negative    Ketones, UA Negative Negative    Specific Gravity, UA 1.010 1.005 - 1.030    Blood, UA Moderate (!) Negative    pH, UA 6.0 5.0 - 8.0    Protein, UA Negative Negative mg/dL    Urobilinogen, UA 0.2 E.U./dL 0.2 E.U./dL, 1.0 E.U./dL    Nitrite, UA Negative Negative    Leukocytes, UA Small (!) Negative    Bacteria, UA Few (!) None Seen hpf    RBC, UA 0-2 None Seen, 0-2 hpf    WBC, UA 5-10 (!) None Seen, 0-5 hpf    Squam Epithel, UA 10-25 (!) None Seen, 0-5 lpf    Yeast, UA Few (!) None Seen hpf     Urine culture is pending.  Assessment:     Procedures    The primary encounter diagnosis was Screening for condition. Diagnoses of Acute cystitis with hematuria, Positive urine pregnancy test, and Vaginal bleeding were also pertinent to this visit.    Plan:     1. Acute cystitis with hematuria  Pregnancy, Urine    Urinalysis-UC if Indicated    Culture, Urine    cephalexin (KEFLEX) 500 MG capsule   2. Screening for condition  Pregnancy, Urine   3. Positive urine pregnancy test     4. Vaginal bleeding         Multiple etiologies and diagnoses were considered.  Primarily I did consider pregnancy, ectopic pregnancy, vaginal bleeding need for RhoGam if she does have threatened , urinary tract infection, nephrolithiasis.  I do think the patient needs to be seen next higher level care at the ER and have an ultrasound as well as a quantitative urinary test.  Called and gave report to the emergency room physician and patient is sent by personal vehicle for evaluation and treatment.  She is stable.  Questions were answered to her questions before discharge.    Patient Instructions     I am concerned that you may have some vaginal discharge or bleeding in addition to your urinary symptoms and gross hematuria  In order to distinguish between a urinary tract infection with some blood in the urine or possibly bleeding in pregnancy I am sending you to the emergency room to have further testing with quantitative blood  pregnancy test and a ultrasound if necessary.  You may discuss the necessity for these tests with the emergency room physician.    Additionally you will need to start taking a pre-gary vitamin and have close follow-up with OB/GYN for follow-up and intake but first you will need to have evaluation in the emergency room.    Increased fluids and rest.  Discussed signs and symptoms of ascending urinary tract infection symptoms to include pyelonephritis. Instructed to turn to clinic if there are increased fever chills night sweats fatigue abdominal pain or flank pain  Antibiotic as written. Risks and benefits of medication discussed.  Indication for return to clinic.    As a result of our visit today, here are the action plans for you:    1. Medication(s) to stop:   Medications Discontinued During This Encounter   Medication Reason   ? potassium citrate (UROCIT-K) 10 mEq (1,080 mg) SR tablet Therapy completed       2. Medication(s) to start or change: No medications were ordered this encounter      3. Other instructions: Yes      Urinary Tract Infections in Women    Urinary tract infections (UTIs) are most often caused by bacteria (germs). These bacteria enter the urinary tract. The bacteria may come from outside the body. Or they may travel from the skin outside the rectum or vagina into the urethra. Female anatomy makes it easier for bacteria from the bowel to enter a womans urinary tract, which is the most common source of UTI. This means women develop UTIs more often than men. Pain in or around the urinary tract is a common UTI symptom. But the only way to know for sure if you have a UTI for the health care provider to test your urine. The two tests that may be done are the urinalysis and urine culture.  Types of UTIs    Cystitis: A bladder infection (cystitis) is the most common UTI in women. You may have urgent or frequent urination. You may also have pain, burning when you urinate, and bloody  urine.    Urethritis: This is an inflamed urethra, which is the tube that carries urine from the bladder to outside the body. You may have lower stomach or back pain. You may also have urgent or frequent urination.    Pyelonephritis: This is a kidney infection. If not treated, it can be serious and damage your kidneys. In severe cases, you may be hospitalized. You may have a fever and lower back pain.  Medications to treat a UTI  Most UTIs are treated with antibiotics. These kill the bacteria. The length of time you need to take them depends on the type of infection. It may be as short as 3 days. If you have repeated UTIs, a low-dose antibiotic may be needed for several months. Take antibiotics exactly as directed. Dont stop taking them until all of the medication is gone. If you stop taking the antibiotic too soon, the infection may not go away, and you may develop a resistance to the antibiotic. This can make it much harder to treat.  Lifestyle changes to treat and prevent UTIs  The lifestyle changes below will help get rid of your UTI. They may also help prevent future UTIs.    Drink plenty of fluids. This includes water, juice, or other caffeine-free drinks. Fluids help flush bacteria out of your body.    Empty your bladder. Always empty your bladder when you feel the urge to urinate. And always urinate before going to sleep. Urine that stays in your bladder can lead to infection. Try to urinate before and after sex as well.    Practice good personal hygiene. Wipe yourself from front to back after using the toilet. This helps keep bacteria from getting into the urethra.    Use condoms during sex. These help prevent UTIs caused by sexually transmitted bacteria. Also, avoid using spermicides during sex. These can increase the risk of UTIs. Choose other forms of birth control instead. For women who tend to get UTIs after sex, a low-dose of a preventive antibiotic may be used. Be sure to discuss this option with  your health care provider.    Follow up with your health care provider as directed. He or she may test to make sure the infection has cleared. If necessary, additional treatment may be started.  Date Last Reviewed: 9/8/2014 2000-2016 [employer]. [address] [place] [address], [place] [address]. All rights reserved. This information is not intended as a substitute for professional medical care. Always follow your healthcare professional's instructions.                    Patient Education     Abdominal Pain and Early Pregnancy    The tests you had show that you are pregnant, but the exact cause of your pain isnt clear.  Some pain and bleeding are common early in pregnancy. Often they stop, and you can go on to have a normal pregnancy and baby. Other times the pain or bleeding can be signs of a miscarriage or ectopic pregnancy. An ectopic pregnancy is a very serious problem. At this time it is unclear if your pregnancy will continue normally, if you will have a miscarriage, or if you could have an ectopic pregnancy. Below is some information about this.  Miscarriage  At this time we dont know whether you will have a miscarriage, or if things will clear up and your pregnancy will continue normally. We understand that this is emotionally difficult. There is little we can say to change the way you feel. But understand that miscarriages are common.  About 1 or 2 out of every 10 pregnancies end this way. Some end even before you know you are pregnant. This happens for a number of reasons, and usually we never figure out why. Its important you know that it is not your fault. It didnt happen because you did anything wrong.  Having sex or exercising does not cause a miscarriage. These activities are usually safe unless you have pain or bleeding or your doctor tells you to stop. Even minor falls wont cause a miscarriage. Miscarriages happen because things were not developing as they were supposed to. No medicine can  prevent a miscarriage.  Ectopic pregnancy  In a normal pregnancy, the fertilized egg attaches to the wall of the womb (uterus). In an ectopic or tubal pregnancy, the fertilized egg attaches outside the uterus, usually in the fallopian tube. Very rarely, the egg attaches to an ovary or somewhere else in the abdomen. An ectopic pregnancy is much less common than a miscarriage, but it is very serious. The baby cannot survive, and as it grows it can rupture the tube. This can cause internal bleeding and even death. Risk factors for an ectopic are:    An ectopic pregnancy in the past    Pelvic inflammatory disease, or PID    Endometriosis    Smoking    An IUD  Additional tests  Because we dont know whats causing your symptoms, you will need more tests to figure out what the problem is. You may need the following.  Ultrasound  An ultrasound can usually find a normal pregnancy as early as 4 to 5 weeks along. If the ultrasound does not show the baby inside the uterus, it means one of the following.    You have a normal pregnancy less than 4 weeks along    You are having or recently had a miscarriage    You have an ectopic pregnancy  Quantitative HCG  This test measures the amount of a pregnancy hormone in your blood. Comparing today's test result to a repeat test in 2 days will show whether you have a normal pregnancy.  Laparoscopy  This is a type of surgery. The healthcare provider will put a tube with a light inside your belly (abdomen) to look directly at your pelvic organs. This test is used when it is not safe to wait 2 days for blood test results.  Important information  If you do have an ectopic pregnancy, there is a small chance that the growing fetus can tear the fallopian tube. This can cause severe internal bleeding. If this happens, you may have:    Sudden severe pain in your lower abdomen    Vaginal bleeding    Weakness, dizziness, and sometimes fainting  If any of these symptoms occur:    Call 911or return  right away to the hospital.    Don't drive yourself.    Don't go to your healthcare provider's office or to a clinic. Go to the hospital.   Home care  Follow these guidelines to help care for yourself at home:    Rest until your next exam. Dont do anything strenuous.    Eat a light diet with foods that are easy to digest.    Dont have sex until your healthcare provider says its OK.  Follow-up care  Follow up with your healthcare provider, or as advised. If you were told to have a repeat blood test in 2 days, its important to get it done.  If you had an X-ray or ultrasound, a radiologist will review it. You will be told of any new findings that may affect your care.  Call 911  Call 911 if you have any of these:    Severe pain and very heavy bleeding    Severe lightheadedness, passing out, or fainting    Rapid heart rate    Trouble breathing    Confused or difficulty waking up  When to seek medical advice  Call your healthcare provider right away if any of these occur:    The pain in your abdomen gets worse, either suddenly or gradually.    You are dizzy or weak when you stand.    You have heavy vaginal bleeding. This means soaking 1 pad an hour for 3 hours.    You have vaginal bleeding for more than 5 days.    You have repeated vomiting or diarrhea.    The pain in your abdomen moves to the lower right.    You have blood in your vomit or bowel movements. This will be dark red or black.    You have a fever of 100.4 F (38 C) or higher, or as directed by your healthcare provider.  Date Last Reviewed: 10/1/2016    3028-8504 [employer]. [address] [place] [address], [place] [address]. All rights reserved. This information is not intended as a substitute for professional medical care. Always follow your healthcare professional's instructions.           Patient Education     Bleeding During Early Pregnancy     Ultrasound can help check the health of your fetus.     If youve had bleeding early in your pregnancy,  youre not alone. Many other pregnant women have had early bleeding, too. And in most cases, nothing is wrong. But your healthcare provider still needs to know about it. He or she may want to do tests to find out why youre bleeding. Call your healthcare provider if you notice bleeding during pregnancy.  What causes early bleeding?  The cause of bleeding early in pregnancy is often unknown. But many factors early on in pregnancy may lead to bleeding or spotting. These include sexual intercourse, which may cause bleeding in any trimester. Here are some other causes:    Implantation of the embryo on the uterine wall    Subchorionic hemorrhage (bleeding between the sac membrane and the uterus)    Miscarriage    Ectopic (tubal) pregnancy  If you notice spotting  Spotting (very light bleeding) is the most common type of bleeding in early pregnancy. If you notice it, call your healthcare provider. Chances are, he or she will tell you that you can care for yourself at home.  If tests are needed  Depending on how much you bleed, your healthcare provider may ask you to come in for some tests. A pelvic exam, for instance, can help see how far along your pregnancy is. You also may have an ultrasound or a Doppler test. These imaging tests use sound waves to check the health of your fetus. The ultrasound may be done on your belly or inside your vagina. Your healthcare provider also may order a special blood test. This test compares your hormone levels in blood samples taken 2 days apart. The results can help your healthcare provider learn more about the implantation of the embryo. Your blood type will also need to be checked to evaluate whether you will need to be treated for Rh sensitization.   Warning signs  If your bleeding doesnt stop or if you notice any of the following, seek medical help right away:    Soaking a sanitary pad each hour    Bleeding like youre having a period    Cramping or severe belly pain    Feeling dizzy  or faint    Tissue passing through your vagina    Bleeding at any time after the first trimester  Questions you may be asked  Though not normal, bleeding early in pregnancy is common. If youve noticed any bleeding, you may be concerned. But keep in mind that bleeding alone doesnt mean something is wrong. Call your healthcare provider right away, though. He or she may ask you questions like these to help find the cause of your bleeding:    When did your bleeding start?    Is your bleeding very light (spotting) or is it like a period?    Is the blood bright red or brownish?    Have you had sexual intercourse recently?    Have you had pain or cramping?    Have you felt dizzy or faint?  Monitoring your pregnancy  Bleeding will often stop as quickly as it began. Your pregnancy may go on a normal path again. You may need to make a few extra prenatal visits. But you and your baby will most likely be fine.  Date Last Reviewed: 6/1/2016 2000-2019 [employer]. [address] [place] [address], [place] [address]. All rights reserved. This information is not intended as a substitute for professional medical care. Always follow your healthcare professional's instructions.

## 2021-07-14 PROBLEM — Z34.90 PREGNANT: Status: RESOLVED | Noted: 2020-12-11 | Resolved: 2020-12-11

## 2021-10-11 ENCOUNTER — HEALTH MAINTENANCE LETTER (OUTPATIENT)
Age: 36
End: 2021-10-11

## 2021-11-10 ENCOUNTER — OFFICE VISIT (OUTPATIENT)
Dept: FAMILY MEDICINE | Facility: CLINIC | Age: 36
End: 2021-11-10
Payer: COMMERCIAL

## 2021-11-10 VITALS
BODY MASS INDEX: 25 KG/M2 | WEIGHT: 136.7 LBS | HEART RATE: 70 BPM | SYSTOLIC BLOOD PRESSURE: 102 MMHG | OXYGEN SATURATION: 99 % | DIASTOLIC BLOOD PRESSURE: 66 MMHG

## 2021-11-10 DIAGNOSIS — Z12.4 CERVICAL CANCER SCREENING: ICD-10-CM

## 2021-11-10 DIAGNOSIS — N92.6 IRREGULAR MENSTRUAL BLEEDING: Primary | ICD-10-CM

## 2021-11-10 LAB
CLUE CELLS: ABNORMAL
HCG UR QL: NEGATIVE
TRICHOMONAS, WET PREP: ABNORMAL
WBC'S/HIGH POWER FIELD, WET PREP: ABNORMAL
YEAST, WET PREP: ABNORMAL

## 2021-11-10 PROCEDURE — 99213 OFFICE O/P EST LOW 20 MIN: CPT | Performed by: FAMILY MEDICINE

## 2021-11-10 PROCEDURE — 87591 N.GONORRHOEAE DNA AMP PROB: CPT | Performed by: FAMILY MEDICINE

## 2021-11-10 PROCEDURE — 87210 SMEAR WET MOUNT SALINE/INK: CPT | Performed by: FAMILY MEDICINE

## 2021-11-10 PROCEDURE — G0124 SCREEN C/V THIN LAYER BY MD: HCPCS | Performed by: PATHOLOGY

## 2021-11-10 PROCEDURE — G0123 SCREEN CERV/VAG THIN LAYER: HCPCS | Performed by: FAMILY MEDICINE

## 2021-11-10 PROCEDURE — 87624 HPV HI-RISK TYP POOLED RSLT: CPT | Performed by: FAMILY MEDICINE

## 2021-11-10 PROCEDURE — 81025 URINE PREGNANCY TEST: CPT | Performed by: FAMILY MEDICINE

## 2021-11-10 PROCEDURE — 87491 CHLMYD TRACH DNA AMP PROBE: CPT | Performed by: FAMILY MEDICINE

## 2021-11-10 NOTE — PROGRESS NOTES
"  Assessment & Plan     Irregular menstrual bleeding  Urine pregnancy test is negative.  Wet prep is negative.  Gonorrhea and Chlamydia probe was collected.  Pap smear was performed.  If testing is negative and irregular menstrual bleeding continues, we will plan to obtain pelvic ultrasound for further evaluation and refer her to gynecology.  - HCG Qual, Urine ([id number])  - HCG Qual, Urine ([id number])  - Wet prep - Clinic Collect  - NEISSERIA GONORRHOEA PCR  - CHLAMYDIA TRACHOMATIS PCR    Cervical cancer screening  - Pap screen with HPV - recommended age 30 - 65 years               BMI:   Estimated body mass index is 25 kg/m  as calculated from the following:    Height as of 12/11/20: 1.575 m (5' 2\").    Weight as of this encounter: 62 kg (136 lb 11.2 oz).           No follow-ups on file.    Karen Pereyra MD  [facility] ZOHAIB Mendoza is a 36 year old who presents for the following health issues     HPI   She is seen today for concern of irregular menstrual bleeding.  She is seen with the assistance of a telephone .  Her last normal menstrual period started on September 26 and lasted through the 30th.  Normally her periods last about 3 days.  She was late getting her period in October.  Current menstrual period started on November 2.  She continues to have bleeding.  Bleeding is very light.  She did a pregnancy test at home and this was negative.  She has had some slight pelvic discomfort.  No pain with intercourse.  She has not had any urinary symptoms such as dysuria, urinary frequency or urgency.  She is concerned about the possibility of pregnancy.  These are symptoms that she had with her last pregnancy early on.  No other concerns or questions.        Review of Systems         Objective    /66 (BP Location: Right arm, Cuff Size: Adult Regular)   Pulse 70   Wt 62 kg (136 lb 11.2 oz)   LMP 10/26/2021   SpO2 99%   BMI 25.00 kg/m    Body mass index is 25 " kg/m .  Physical Exam   GENERAL: healthy, alert and no distress   (female): normal female external genitalia, normal urethral meatus , vaginal mucosa pink, moist, well rugated and cervical erythema present, small amount of blood coming from external cervical os  MS: no gross musculoskeletal defects noted, no edema    Results for orders placed or performed in visit on 11/10/21 (from the past 24 hour(s))   HCG Qual, Urine ([id number])   Result Value Ref Range    hCG Urine Qualitative Negative Negative   Wet prep - Clinic Collect    Specimen: Vagina; Swab   Result Value Ref Range    Trichomonas Absent Absent    Yeast Absent Absent    Clue Cells Absent Absent    WBCs/high power field 1+ (A) None

## 2021-11-11 DIAGNOSIS — N93.8 DYSFUNCTIONAL UTERINE BLEEDING: Primary | ICD-10-CM

## 2021-11-11 LAB
C TRACH DNA SPEC QL NAA+PROBE: NEGATIVE
N GONORRHOEA DNA SPEC QL NAA+PROBE: NEGATIVE

## 2021-11-12 ENCOUNTER — APPOINTMENT (OUTPATIENT)
Dept: INTERPRETER SERVICES | Facility: CLINIC | Age: 36
End: 2021-11-12
Payer: COMMERCIAL

## 2021-11-15 LAB
HUMAN PAPILLOMA VIRUS 16 DNA: NEGATIVE
HUMAN PAPILLOMA VIRUS 18 DNA: NEGATIVE
HUMAN PAPILLOMA VIRUS FINAL DIAGNOSIS: ABNORMAL
HUMAN PAPILLOMA VIRUS OTHER HR: POSITIVE

## 2021-11-23 LAB
BKR LAB AP GYN ADEQUACY: ABNORMAL
BKR LAB AP GYN INTERPRETATION: ABNORMAL
BKR LAB AP HPV REFLEX: ABNORMAL
BKR LAB AP LMP: ABNORMAL
BKR LAB AP PREVIOUS ABNORMAL: ABNORMAL
PATH REPORT.COMMENTS IMP SPEC: ABNORMAL
PATH REPORT.COMMENTS IMP SPEC: ABNORMAL
PATH REPORT.RELEVANT HX SPEC: ABNORMAL

## 2021-11-26 ENCOUNTER — PATIENT OUTREACH (OUTPATIENT)
Dept: FAMILY MEDICINE | Facility: CLINIC | Age: 36
End: 2021-11-26
Payer: COMMERCIAL

## 2021-11-26 DIAGNOSIS — N93.9 ABNORMAL VAGINAL BLEEDING: Primary | ICD-10-CM

## 2021-11-26 DIAGNOSIS — R87.810 CERVICAL HIGH RISK HPV (HUMAN PAPILLOMAVIRUS) TEST POSITIVE: ICD-10-CM

## 2021-11-26 DIAGNOSIS — R87.612 PAPANICOLAOU SMEAR OF CERVIX WITH LOW GRADE SQUAMOUS INTRAEPITHELIAL LESION (LGSIL): ICD-10-CM

## 2021-11-29 NOTE — TELEPHONE ENCOUNTER
Updated plan:    11/10/21 LSIL pap, + HR HPV (not 16/18). Plan: cotest in 1 year okay per provider

## 2021-11-30 ENCOUNTER — IMMUNIZATION (OUTPATIENT)
Dept: NURSING | Facility: CLINIC | Age: 36
End: 2021-11-30
Payer: COMMERCIAL

## 2021-11-30 PROCEDURE — 0004A PR COVID VAC PFIZER DIL RECON 30 MCG/0.3 ML IM: CPT

## 2021-11-30 PROCEDURE — 91300 PR COVID VAC PFIZER DIL RECON 30 MCG/0.3 ML IM: CPT

## 2022-02-11 ENCOUNTER — ANCILLARY PROCEDURE (OUTPATIENT)
Dept: GENERAL RADIOLOGY | Facility: CLINIC | Age: 37
End: 2022-02-11
Attending: FAMILY MEDICINE
Payer: COMMERCIAL

## 2022-02-11 ENCOUNTER — OFFICE VISIT (OUTPATIENT)
Dept: FAMILY MEDICINE | Facility: CLINIC | Age: 37
End: 2022-02-11
Payer: COMMERCIAL

## 2022-02-11 VITALS
BODY MASS INDEX: 24.24 KG/M2 | HEART RATE: 60 BPM | HEIGHT: 62 IN | SYSTOLIC BLOOD PRESSURE: 110 MMHG | OXYGEN SATURATION: 98 % | DIASTOLIC BLOOD PRESSURE: 60 MMHG | WEIGHT: 131.7 LBS

## 2022-02-11 DIAGNOSIS — G89.29 CHRONIC LOW BACK PAIN, UNSPECIFIED BACK PAIN LATERALITY, UNSPECIFIED WHETHER SCIATICA PRESENT: ICD-10-CM

## 2022-02-11 DIAGNOSIS — Z00.00 ROUTINE HISTORY AND PHYSICAL EXAMINATION OF ADULT: Primary | ICD-10-CM

## 2022-02-11 DIAGNOSIS — L30.9 DERMATITIS: ICD-10-CM

## 2022-02-11 DIAGNOSIS — M54.50 CHRONIC LOW BACK PAIN, UNSPECIFIED BACK PAIN LATERALITY, UNSPECIFIED WHETHER SCIATICA PRESENT: ICD-10-CM

## 2022-02-11 DIAGNOSIS — R87.612 PAPANICOLAOU SMEAR OF CERVIX WITH LOW GRADE SQUAMOUS INTRAEPITHELIAL LESION (LGSIL): ICD-10-CM

## 2022-02-11 LAB — HCG UR QL: NEGATIVE

## 2022-02-11 PROCEDURE — 81025 URINE PREGNANCY TEST: CPT | Performed by: FAMILY MEDICINE

## 2022-02-11 PROCEDURE — 99213 OFFICE O/P EST LOW 20 MIN: CPT | Mod: 25 | Performed by: FAMILY MEDICINE

## 2022-02-11 PROCEDURE — 72100 X-RAY EXAM L-S SPINE 2/3 VWS: CPT | Mod: TC | Performed by: RADIOLOGY

## 2022-02-11 PROCEDURE — 99395 PREV VISIT EST AGE 18-39: CPT | Performed by: FAMILY MEDICINE

## 2022-02-11 RX ORDER — CYCLOBENZAPRINE HCL 5 MG
5 TABLET ORAL 3 TIMES DAILY PRN
Qty: 30 TABLET | Refills: 0 | Status: SHIPPED | OUTPATIENT
Start: 2022-02-11 | End: 2022-10-11

## 2022-02-11 RX ORDER — NYSTATIN AND TRIAMCINOLONE ACETONIDE 100000; 1 [USP'U]/G; MG/G
CREAM TOPICAL 2 TIMES DAILY
Qty: 60 G | Refills: 1 | Status: SHIPPED | OUTPATIENT
Start: 2022-02-11 | End: 2022-10-11

## 2022-02-11 ASSESSMENT — MIFFLIN-ST. JEOR: SCORE: 1231.67

## 2022-02-11 NOTE — PROGRESS NOTES
SUBJECTIVE:   CC: Kelly Shannon is an 36 year old woman who presents for preventive health visit.   She is seen with the assistance of an  via telephone.  Reviewed her medications, allergies, family and social history.  She has a few concerns today.    1.  She has history of lower back pain.  This started in  or  after she injured herself caring for her  after he had surgery.  She was helping to lift him out of a chair and felt a crack in her lower back.  Since then she has occasional flareups of pain if she lifts something heavy.  Sometimes will go down into her right leg and up into her upper back area.  She does not have any numbness or tingling or weakness.  Pain is usually in the right lower back but sometimes is in the left lower back as well.  She works 10-hour shifts and sits for most of the shift in a hunched over position.  Sometimes when her back is hurting she would like to be able to go home early.  Her work advised her to get a letter from her doctor.  Typically resting helps decrease the pain.  Sometimes she will take medication such as Tylenol but she prefers not to take medication.  Sometimes has used warm packs and topical pain relieving creams.  She would like an x-ray of her back today    2.  She has history of LGSIL Pap from 2021.  Colposcopy was recommended at the time but she was having some irregular menstrual bleeding so prefer not to have the colposcopy.  That bleeding has resolved and her recent menstrual periods have been normal.  She would like to proceed with a colposcopy.    3.  She had a  about a year ago.  She gets some itching, pinching and redness around the  scar.  She is using lotion.  She wonders if this is normal.    Review of systems is assessed and is otherwise negative.  No other questions or concerns today    Patient has been advised of split billing requirements and indicates understanding: Yes  Healthy Habits:      Getting at least 3 servings of Calcium per day:  Yes    Bi-annual eye exam:  Yes    Dental care twice a year:  Yes    Sleep apnea or symptoms of sleep apnea:  None    Diet:  Regular (no restrictions)    Frequency of exercise:  1 day/week    Duration of exercise:  Less than 15 minutes    Taking medications regularly:  Not Applicable    Medication side effects:  Not applicable    PHQ-2 Total Score: 0    Additional concerns today:  Yes              Today's PHQ-2 Score:   PHQ-2 ( 1999 [employer]) 2/10/2022   Q1: Little interest or pleasure in doing things 0   Q2: Feeling down, depressed or hopeless 0   PHQ-2 Score 0   Q1: Little interest or pleasure in doing things Not at all   Q2: Feeling down, depressed or hopeless Not at all   PHQ-2 Score 0       Abuse: Current or Past (Physical, Sexual or Emotional) - No  Do you feel safe in your environment? Yes    Have you ever done Advance Care Planning? (For example, a Health Directive, POLST, or a discussion with a medical provider or your loved ones about your wishes): No, advance care planning information given to patient to review.  Patient declined advance care planning discussion at this time.    Social History     Tobacco Use     Smoking status: Never Smoker     Smokeless tobacco: Never Used   Substance Use Topics     Alcohol use: No         Alcohol Use 2/10/2022   Prescreen: >3 drinks/day or >7 drinks/week? No       Reviewed orders with patient.  Reviewed health maintenance and updated orders accordingly - Yes      Breast Cancer Screening:  Any new diagnosis of family breast, ovarian, or bowel cancer? No    FHS-7: No flowsheet data found.      Pertinent mammograms are reviewed under the imaging tab.    History of abnormal Pap smear: YES - updated in Problem List and Health Maintenance accordingly  PAP / HPV Latest Ref Rng & Units 11/10/2021 10/20/2017   PAP   Low-grade squamous intraepithelial lesion (LSIL) encompassing HPV/mild dysplasia/CIN1(A) Negative for squamous  "intraepithelial lesion or malignancy  Electronically signed by Tess Duckworth CT (ASCP) on 10/26/2017 at  2:13 PM     HPV16 Negative Negative -   HPV18 Negative Negative -   HRHPV Negative Positive(A) -     Reviewed and updated as needed this visit by clinical staff  Tobacco  Allergies  Meds             Reviewed and updated as needed this visit by Provider                   Review of Systems  See HPI     OBJECTIVE:   /60 (BP Location: Right arm, Cuff Size: Adult Regular)   Pulse 60   Ht 1.568 m (5' 1.75\")   Wt 59.7 kg (131 lb 11.2 oz)   LMP 2022   SpO2 98%   BMI 24.28 kg/m    Physical Exam  GENERAL: healthy, alert and no distress  EYES: Eyes grossly normal to inspection, PERRL and conjunctivae and sclerae normal  HENT: normal cephalic/atraumatic and ear canals and TM's normal  RESP: lungs clear to auscultation - no rales, rhonchi or wheezes  BREAST: normal without masses, tenderness or nipple discharge and no palpable axillary masses or adenopathy  CV: regular rate and rhythm, normal S1 S2, no S3 or S4, no murmur, click or rub, no peripheral edema and peripheral pulses strong  ABDOMEN: soft, nontender, no hepatosplenomegaly, no masses and bowel sounds normal  MS: tenderness to palpation right lower back, neg straight leg test bilaterally.  Normal patellar and ankle reflexes bilaterally.  5 out of 5 strength in bilateral lower extremities  SKIN: Has scar in suprapubic region at site of .  There is some mild erythema of the scar  NEURO: Normal strength and tone, mentation intact and speech normal  PSYCH: mentation appears normal, affect normal/bright        ASSESSMENT/PLAN:   Kelly was seen today for physical.    Diagnoses and all orders for this visit:    Routine history and physical examination of adult  Encouraged regular exercise and healthy diet.  Colposcopy plannned for further evaluation of recent abnormal Pap smear.    Chronic low back pain, unspecified back pain laterality, " "unspecified whether sciatica present  -     XR Lumbar Spine 2/3 Views; Future  -     Physical Therapy Referral; Future  -     cyclobenzaprine (FLEXERIL) 5 MG tablet; Take 1 tablet (5 mg) by mouth 3 times daily as needed for muscle spasms  -     HCG Qual, Urine ([id number]); Future  -     HCG Qual, Urine ([id number])  -X-ray performed in clinic and personally reviewed.  Per my view that showed loss of normal lumbar lordosis.  Pregnancy test was performed prior to x-ray due to concern of possible pregnancy.  This was negative.  Referral placed for physical therapy.  Prescription for cyclobenzaprine provided to help with muscle tightness and spasm.  Counseled on use of medication.  Consider consult at spine center if not improving with physical therapy.  Note provided for work    Dermatitis  -     nystatin-triamcinolone (MYCOLOG II) 156440-8.1 UNIT/GM-% external cream; Apply topically 2 times daily  -Provided prescription for above cream to apply to  scar    Papanicolaou smear of cervix with low grade squamous intraepithelial lesion (LGSIL)  She will schedule colposcopy in the near future      Patient has been advised of split billing requirements and indicates understanding: Yes    COUNSELING:  Reviewed preventive health counseling, as reflected in patient instructions    Estimated body mass index is 24.28 kg/m  as calculated from the following:    Height as of this encounter: 1.568 m (5' 1.75\").    Weight as of this encounter: 59.7 kg (131 lb 11.2 oz).        She reports that she has never smoked. She has never used smokeless tobacco.      Counseling Resources:  ATP IV Guidelines  Pooled Cohorts Equation Calculator  Breast Cancer Risk Calculator  BRCA-Related Cancer Risk Assessment: FHS-7 Tool  FRAX Risk Assessment  ICSI Preventive Guidelines  Dietary Guidelines for Americans, 2010  [employer]'s MyPlate  ASA Prophylaxis  Lung CA Screening    Karen Pereyra MD  [facility]  "

## 2022-02-11 NOTE — PATIENT INSTRUCTIONS
Return for colposcopy    We will need to do a pregnancy test the day of the colposcopy    Take ibuprofen before colposcopy to help reduce discomfort    Use triamcinlone-nystatin cream on  scar twice daily as needed    Use cyclobenzaprine (muscle relaxer) as needed for back pain    Referral placed for physical therapy

## 2022-02-11 NOTE — LETTER
February 11, 2022      Kelly Shannon  [address] [place] [address]        To Whom It May Concern:    Kelly Shannon  was seen on 2/11/22 for back pain.  Please excuse her from work early if she is having severe back pain.        Sincerely,        Karen Pereyra MD

## 2022-02-14 ENCOUNTER — TELEPHONE (OUTPATIENT)
Dept: FAMILY MEDICINE | Facility: CLINIC | Age: 37
End: 2022-02-14
Payer: COMMERCIAL

## 2022-02-14 NOTE — TELEPHONE ENCOUNTER
----- Message from Karen Pereyra MD sent at 2/13/2022  5:23 PM CST -----  Please let pt know that the xray of her back shows some degenerative changes and signs of muscle tightness. If her symptoms don't improve with physical therapy, I recommend a consult at the spine center

## 2022-02-16 ENCOUNTER — NURSE TRIAGE (OUTPATIENT)
Dept: NURSING | Facility: CLINIC | Age: 37
End: 2022-02-16
Payer: COMMERCIAL

## 2022-02-16 ENCOUNTER — MYC MEDICAL ADVICE (OUTPATIENT)
Dept: FAMILY MEDICINE | Facility: CLINIC | Age: 37
End: 2022-02-16
Payer: COMMERCIAL

## 2022-02-16 NOTE — CONFIDENTIAL NOTE
Patient is calling back regarding message.  FNA relayed message from Karen Nguyen regarding degenerative changes and physical therapy.  Patient agrees.    Sudha Louie RN/FNA

## 2022-02-17 ENCOUNTER — TELEPHONE (OUTPATIENT)
Dept: FAMILY MEDICINE | Facility: CLINIC | Age: 37
End: 2022-02-17
Payer: COMMERCIAL

## 2022-02-17 NOTE — TELEPHONE ENCOUNTER
Left message to return call. The leave for [employer] was put that she would have intermittent leave. Meaning that if she needed to miss a day of work or leave early because of back pain. Continuous leave would be for example having a month off and then returning to work per her usual schedule.

## 2022-02-17 NOTE — TELEPHONE ENCOUNTER
Spoke with Kelly and her spouse and explained how the intermittent leave works versus continuous. They expressed understanding and she will let her work know that the intermittent is what she wants.

## 2022-02-25 ENCOUNTER — OFFICE VISIT (OUTPATIENT)
Dept: FAMILY MEDICINE | Facility: CLINIC | Age: 37
End: 2022-02-25
Payer: COMMERCIAL

## 2022-02-25 VITALS
HEART RATE: 78 BPM | DIASTOLIC BLOOD PRESSURE: 70 MMHG | SYSTOLIC BLOOD PRESSURE: 104 MMHG | OXYGEN SATURATION: 98 % | BODY MASS INDEX: 24.23 KG/M2 | WEIGHT: 131.4 LBS

## 2022-02-25 DIAGNOSIS — B97.7 HIGH RISK HPV INFECTION: ICD-10-CM

## 2022-02-25 DIAGNOSIS — Z98.890 HISTORY OF COLPOSCOPY: Primary | ICD-10-CM

## 2022-02-25 DIAGNOSIS — R87.612 PAPANICOLAOU SMEAR OF CERVIX WITH LOW GRADE SQUAMOUS INTRAEPITHELIAL LESION (LGSIL): ICD-10-CM

## 2022-02-25 LAB — HCG UR QL: NEGATIVE

## 2022-02-25 PROCEDURE — 81025 URINE PREGNANCY TEST: CPT | Performed by: FAMILY MEDICINE

## 2022-02-25 PROCEDURE — 99207 PR DROP WITH A PROCEDURE: CPT | Performed by: FAMILY MEDICINE

## 2022-02-25 PROCEDURE — 57454 BX/CURETT OF CERVIX W/SCOPE: CPT | Performed by: FAMILY MEDICINE

## 2022-02-25 PROCEDURE — 88305 TISSUE EXAM BY PATHOLOGIST: CPT | Mod: 59 | Performed by: PATHOLOGY

## 2022-02-25 NOTE — PROGRESS NOTES
Assessment & Plan     History of colposcopy    - HCG qualitative urine  - HCG qualitative urine  - Colposcopy cervix with cervix biopsy and ECC    Papanicolaou smear of cervix with low grade squamous intraepithelial lesion (LGSIL)    - Surgical Pathology Exam  - Colposcopy cervix with cervix biopsy and ECC    High risk HPV infection    - Surgical Pathology Exam  - Colposcopy cervix with cervix biopsy and ECC    Discussed indication for colposcopy with patient.  Discussed procedure as well as risks of discomfort, bleeding, infection and reaction to topical solutions.  Consent form signed and scanned into patient's chart.  Urine pregnancy test was done and was negative.  Cervix was visualized with speculum.  Entire squamocolumnar junction was visualized.  Adjacent vagina was normal in appearance.  There were no gross cervical abnormalities.  3% acetic acid solution was applied to the surface of the cervix.  No acetowhite changes noted.  Lugol's solution was then applied with no areas of decreased uptake.  Cervical biopsies were performed at 6:00 and 12:00 cervical positions.  Endocervical curettage also performed.  Specimens placed in formalin containers and sent for pathology.  Patient tolerated procedure well.  Estimated blood loss minimal.  Hemostasis achieved with pressure and Monsel solution.  Counseled patient she may experience light bleeding and cramping and discharge over the next few days.  Should avoid tampons and sex for 1 week.  Should notify us if she experiences severe abdominal pain, heavy bleeding, fevers or chills or other symptoms of concern.  Overall impression is that of FREDI-1.  We will notify her of colposcopy results when available and follow-up recommendations.                 No follow-ups on file.    Karen Pereyra MD  [facility] 37 [facility] who presents for the following health issues     HPI   She presents today for colposcopy.  She had a  Pap smear in November 2021.  Results showed low-grade squamous intraepithelial lesion.  HPV test was positive for high risk type HPV, not 16 or 18.  Colposcopy was recommended for further evaluation.        Review of Systems         Objective    /70 (BP Location: Right arm, Cuff Size: Adult Regular)   Pulse 78   Wt 59.6 kg (131 lb 6.4 oz)   SpO2 98%   BMI 24.23 kg/m    Body mass index is 24.23 kg/m .  Physical Exam   GENERAL: healthy, alert and no distress   (female): normal female external genitalia and vaginal mucosa pink, moist, well rugated    Results for orders placed or performed in visit on 02/25/22 (from the past 24 hour(s))   HCG qualitative urine   Result Value Ref Range    hCG Urine Qualitative Negative Negative

## 2022-03-02 LAB
PATH REPORT.COMMENTS IMP SPEC: NORMAL
PATH REPORT.FINAL DX SPEC: NORMAL
PATH REPORT.GROSS SPEC: NORMAL
PATH REPORT.MICROSCOPIC SPEC OTHER STN: NORMAL
PATH REPORT.RELEVANT HX SPEC: NORMAL
PHOTO IMAGE: NORMAL

## 2022-03-10 ENCOUNTER — TELEPHONE (OUTPATIENT)
Dept: FAMILY MEDICINE | Facility: CLINIC | Age: 37
End: 2022-03-10
Payer: COMMERCIAL

## 2022-03-10 NOTE — TELEPHONE ENCOUNTER
----- Message from Karen Pereyra MD sent at 3/10/2022 12:21 PM CST -----  Please let pt know that biopsy from colposcopy does not show any high grade lesions. I recommend a follow up pap in 1 year

## 2022-03-24 ENCOUNTER — APPOINTMENT (OUTPATIENT)
Dept: INTERPRETER SERVICES | Facility: CLINIC | Age: 37
End: 2022-03-24
Payer: COMMERCIAL

## 2022-03-25 ENCOUNTER — HOSPITAL ENCOUNTER (OUTPATIENT)
Dept: PHYSICAL THERAPY | Facility: REHABILITATION | Age: 37
Discharge: HOME | End: 2022-03-25
Attending: FAMILY MEDICINE
Payer: COMMERCIAL

## 2022-03-25 DIAGNOSIS — M54.50 CHRONIC LOW BACK PAIN, UNSPECIFIED BACK PAIN LATERALITY, UNSPECIFIED WHETHER SCIATICA PRESENT: ICD-10-CM

## 2022-03-25 DIAGNOSIS — G89.29 CHRONIC LOW BACK PAIN, UNSPECIFIED BACK PAIN LATERALITY, UNSPECIFIED WHETHER SCIATICA PRESENT: ICD-10-CM

## 2022-03-25 PROCEDURE — 97032 APPL MODALITY 1+ESTIM EA 15: CPT | Mod: GP | Performed by: PHYSICAL THERAPIST

## 2022-03-25 PROCEDURE — 97110 THERAPEUTIC EXERCISES: CPT | Mod: GP | Performed by: PHYSICAL THERAPIST

## 2022-03-25 PROCEDURE — 97161 PT EVAL LOW COMPLEX 20 MIN: CPT | Mod: GP | Performed by: PHYSICAL THERAPIST

## 2022-03-25 NOTE — PROGRESS NOTES
Date 3/25/22   Exercise    Stretches: SKC, LTR, QL, piriformis above 90 Hold 30 seconds x 1-3 reps

## 2022-04-01 NOTE — PROGRESS NOTES
[facility]    OUTPATIENT PHYSICAL THERAPY ORTHOPEDIC EVALUATION  PLAN OF TREATMENT FOR OUTPATIENT REHABILITATION  (COMPLETE FOR INITIAL CLAIMS ONLY)  Patient's Last Name, First Name, M.I.  YOB: 1985  Kelly Shannon       Provider s Name:  [facility]   Medical Record No.  [id number]   Start of Care Date:  03/25/22   Onset Date:  02/11/22   Type:     _X__PT   ___OT   ___SLP Medical Diagnosis:  (P) Chronic Low Back Pain     PT Diagnosis:  Chronic Low Back Pain   Visits from SOC:  1      _________________________________________________________________________________  Plan of Treatment/Functional Goals:  joint mobilization, manual therapy, neuromuscular re-education, ROM, strengthening, stretching     Electrical stimulation, TENS, Traction     Goals  Goal Identifier: lifting  Goal Description: Pt will be able to demonstrate good technique with lifting and able to gain strength to help with stabilizaiton to prevent reoccurances of back pain  Target Date: 05/24/22    Goal Identifier: housework  Goal Description: Pt will be able to perform housework, such as swepping or mopping,   Target Date: 05/24/22                                                                      Therapy Frequency:  2 times/Week  Predicted Duration of Therapy Intervention:  10, prn    Ange Grant, PT                 I CERTIFY THE NEED FOR THESE SERVICES FURNISHED UNDER        THIS PLAN OF TREATMENT AND WHILE UNDER MY CARE .             Physician Signature               Date    X_____________________________________________________                             Certification Date From:  (P) 03/25/22   Certification Date To:  (P) 06/29/22    Referring Provider:  Dr. Pereyra    Initial Assessment        See Epic Evaluation Start of Care Date: 03/25/22                                                                                  03/25/22 1100   General Information   Type of Visit Initial OP Ortho PT Evaluation   Start of Care Date 03/25/22   Referring Physician Dr. Pereyra   Orders Evaluate and Treat   Certification Required? Yes   Medical Diagnosis Chronic Low Back pain   Surgical/Medical history reviewed Yes   Precautions/Limitations no known precautions/limitations       Present Yes   Language Hmong   Body Part(s)   Body Part(s) Lumbar Spine/SI   Presentation and Etiology   Pertinent history of current problem (include personal factors and/or comorbidities that impact the POC) She injured her back a few years ago trying to care for her .  Since then she has exacerbations of pain that will last a few days then resolve. This time she went to mop the floor and when she straightened up she felt pain. Denies having any treatment for her low back.    Impairments A. Pain;D. Decreased ROM;E. Decreased flexibility;F. Decreased strength and endurance   Functional Limitations perform activities of daily living;perform desired leisure / sports activities   Symptom Location More left low back, denies leg involvement   How/Where did it occur Other   Onset date of current episode/exacerbation 02/11/22   Chronicity Recurrent   Pain rating (0-10 point scale) Best (/10);Worst (/10)   Best (/10) 2   Worst (/10) 4   Pain quality A. Sharp   Frequency of pain/symptoms A. Constant   Pain/symptoms are: Other   Pain symptoms comment activity dependent   Pain/symptoms exacerbated by C. Lifting;M. Other   Pain exacerbation comment bending,    Pain/symptoms eased by C. Rest;E. Changing positions;G. Heat   Progression of symptoms since onset: Improved   Current / Previous Interventions   Diagnostic Tests: X-ray   X-ray Results Results   X-ray results anterolisthesis of L5 over S1   Prior Level of Function   Functional Level Prior Comment  due to chronic bilateral pars defect.  Multi-level degeneration   Current  Level of Function   Patient role/employment history A. Employed   Fall Risk Screen   Fall screen completed by PT   Have you fallen 2 or more times in the past year? No   Have you fallen and had an injury in the past year? No   Is patient a fall risk? No   Abuse Screen (yes response referral indicated)   Feels Unsafe at Home or Work/School no   Feels Threatened by Someone no   Does Anyone Try to Keep You From Having Contact with Others or Doing Things Outside Your Home? no   Physical Signs of Abuse Present no   Patient needs abuse support services and resources No   Functional Scales   Functional Scales Other   Other Scales  oswestry: 56   Lumbar Spine/SI Objective Findings   Posture forward head   Gait/Locomotion slow   Flexion ROM hands to knees, slow and controlled, pain   Extension ROM pain   Right Side Bending ROM hands half way down thighs, pain   Left Side Bending ROM hands half way down thighs, pain   Hip Screen hip AROM WNL   Lumbar/Hip/Knee/Foot Strength Comments LE strength guarded throughout due to pain levels   Hamstring Flexibility fair   Hip Flexor Flexibility fair   Piriformis Flexibility fair   SLR + left at 60 degrees   Slump Test negative   Segmental Mobility too painful to assess   Palpation pain throughout B lower thoracic and lumbar paraspinals, left piriformis, QL   Planned Therapy Interventions   Planned Therapy Interventions joint mobilization;manual therapy;neuromuscular re-education;ROM;strengthening;stretching   Planned Modality Interventions   Planned Modality Interventions Electrical stimulation;TENS;Traction   Clinical Impression   Criteria for Skilled Therapeutic Interventions Met yes, treatment indicated   PT Diagnosis Chronic Low Back Pain   Influenced by the following impairments pain and limited AROM, decrease strength, + SLR   Functional limitations due to impairments bending, lifting, household tasks   Clinical Presentation Stable/Uncomplicated   Clinical Decision Making  (Complexity) Low complexity   Therapy Frequency 2 times/Week   Predicted Duration of Therapy Intervention (days/wks) 10, prn   Risk & Benefits of therapy have been explained Yes   Patient, Family & other staff in agreement with plan of care Yes   Clinical Impression Comments Pt presents with recurrent chronic low back.  The current flare-up started after houswork then straightening up and has had pain since.  She demonstrates decrease and pain with trunk AROM, pain with MMT, pain with palpation and + SLR on Left.  Feel pt will benefit from physical therapy to work on limitations and improve function and stabilization.    Education Assessment   Barriers to Learning Language   ORTHO GOALS   PT Ortho Eval Goals 1;2   Ortho Goal 1   Goal Identifier lifting   Goal Description Pt will be able to demonstrate good technique with lifting and able to gain strength to help with stabilizaiton to prevent reoccurances of back pain   Target Date 05/24/22   Ortho Goal 2   Goal Identifier housework   Goal Description Pt will be able to perform housework, such as swepping or mopping,    Target Date 05/24/22   Total Evaluation Time   PT Eval, Low Complexity Minutes (33931) 30   Therapy Certification   Certification date from 03/25/22   Certification date to 06/29/22   Medical Diagnosis Chronic Low Back Pain

## 2022-04-01 NOTE — ADDENDUM NOTE
Encounter addended by: Ange Grant, PT on: 4/1/2022 11:40 AM   Actions taken: Clinical Note Signed, Flowsheet accepted

## 2022-05-03 NOTE — ADDENDUM NOTE
Encounter addended by: Ange Grant, PT on: 5/3/2022 9:01 AM   Actions taken: Episode resolved, Clinical Note Signed

## 2022-05-03 NOTE — PROGRESS NOTES
[facility]    Outpatient Physical Therapy Discharge Note  Patient: Kelly Shannon  : 1985    Beginning/End Dates of Reporting Period:  3/25/22 for evaluation only    Referring Provider: Dr. Pereyra    Therapy Diagnosis: chronic low back pain     Client Self Report: see eval    Objective Measurements:  Objective Measure: oswestry: 56 on IE 3/25/22           Goals:  Goal Identifier lifting   Goal Description Pt will be able to demonstrate good technique with lifting and able to gain strength to help with stabilizaiton to prevent reoccurances of back pain   Target Date 22   Date Met      Progress (detail required for progress note):       Goal Identifier housework   Goal Description Pt will be able to perform housework, such as swepping or mopping,    Target Date 22   Date Met      Progress (detail required for progress note):           Plan:  Discharge from therapy.    Discharge:    Reason for Discharge: patient came in for evaluation only    Equipment Issued:     Discharge Plan: Patient to continue home program.

## 2022-07-19 ENCOUNTER — OFFICE VISIT (OUTPATIENT)
Dept: INTERNAL MEDICINE | Facility: CLINIC | Age: 37
End: 2022-07-19
Payer: COMMERCIAL

## 2022-07-19 VITALS
HEART RATE: 66 BPM | SYSTOLIC BLOOD PRESSURE: 112 MMHG | BODY MASS INDEX: 24.48 KG/M2 | RESPIRATION RATE: 16 BRPM | TEMPERATURE: 97.7 F | WEIGHT: 133 LBS | OXYGEN SATURATION: 99 % | HEIGHT: 62 IN | DIASTOLIC BLOOD PRESSURE: 72 MMHG

## 2022-07-19 DIAGNOSIS — L24.9 IRRITANT CONTACT DERMATITIS, UNSPECIFIED TRIGGER: Primary | ICD-10-CM

## 2022-07-19 PROCEDURE — 99213 OFFICE O/P EST LOW 20 MIN: CPT | Performed by: NURSE PRACTITIONER

## 2022-07-19 RX ORDER — PREDNISONE 10 MG/1
TABLET ORAL
Qty: 35 TABLET | Refills: 0 | Status: SHIPPED | OUTPATIENT
Start: 2022-07-19 | End: 2022-08-03

## 2022-07-19 ASSESSMENT — PAIN SCALES - GENERAL: PAINLEVEL: NO PAIN (0)

## 2022-07-19 NOTE — PROGRESS NOTES
Internal Medicine Office Visit  [facility]   Patient Name: Kelly Shannon  Patient Age: 37 year old  YOB: 1985  MRN: [id number]    Date of Visit: 7/19/2022  Patient presents with:  Derm Problem: Rash bilateral feet since Saturday feet itchy and feel tight           Assessment / Plan / Medical Decision Making:    Problem List Items Addressed This Visit    None     Visit Diagnoses     Irritant contact dermatitis, unspecified trigger    -  Primary    Relevant Medications    predniSONE (DELTASONE) 10 MG tablet         - Due to widespread rash, will use oral steroid rather than a topical.  Reviewed use of prednisone including risks and benefits.  She is advised to take the medication in the morning.      I am having Kelly Shannon start on predniSONE. I am also having her maintain her nystatin-triamcinolone and cyclobenzaprine.          No orders of the defined types were placed in this encounter.  Followup: Return in 10 days (on 7/29/2022), or if symptoms worsen or fail to improve. earlier if needed.        Elena Howell NP, CNP        HPI:  Kelly Shannon is a 37 year old year old who presents to the office today for itching feet, a telephone [employer]  is used for this visit. She was at a park over the weekend and she walked around in the grass in sandals and she was wearing a dress as well. She has been using a topical poison ivy wash and then a cream for itching. It helped at first but today it's itchy again.         Health Maintenance Review  Health Maintenance   Topic Date Due     ANNUAL REVIEW OF HM ORDERS  Never done     HIV SCREENING  Never done     COVID-19 Vaccine (4 - Booster for Pfizer series) 02/28/2022     INFLUENZA VACCINE (1) 09/01/2022     PAP FOLLOW-UP  11/10/2022     HPV FOLLOW-UP  11/10/2022     PREVENTIVE CARE VISIT  02/11/2023     ADVANCE CARE PLANNING  02/11/2027     DTAP/TDAP/TD IMMUNIZATION (4 - Td or Tdap) 10/09/2030     HEPATITIS C SCREENING  Completed     PHQ-2  "(once per calendar year)  Completed     Pneumococcal Vaccine: Pediatrics (0 to 5 Years) and At-Risk Patients (6 to 64 Years)  Aged Out     IPV IMMUNIZATION  Aged Out     MENINGITIS IMMUNIZATION  Aged Out     HEPATITIS B IMMUNIZATION  Aged Out       Current Scheduled Meds:  Outpatient Encounter Medications as of 7/19/2022   Medication Sig Dispense Refill     predniSONE (DELTASONE) 10 MG tablet Take 4 tablets (40 mg) by mouth daily for 5 days, THEN 2 tablets (20 mg) daily for 5 days, THEN 1 tablet (10 mg) daily for 5 days. 35 tablet 0     cyclobenzaprine (FLEXERIL) 5 MG tablet Take 1 tablet (5 mg) by mouth 3 times daily as needed for muscle spasms (Patient not taking: Reported on 7/19/2022) 30 tablet 0     nystatin-triamcinolone (MYCOLOG II) 796099-7.1 UNIT/GM-% external cream Apply topically 2 times daily (Patient not taking: Reported on 7/19/2022) 60 g 1     No facility-administered encounter medications on file as of 7/19/2022.         Objective / Physical Examination:  Vitals:    07/19/22 1458   BP: 112/72   BP Location: Right arm   Patient Position: Sitting   Pulse: 66   Resp: 16   Temp: 97.7  F (36.5  C)   SpO2: 99%   Weight: 60.3 kg (133 lb)   Height: 1.568 m (5' 1.75\")     Wt Readings from Last 3 Encounters:   07/19/22 60.3 kg (133 lb)   02/25/22 59.6 kg (131 lb 6.4 oz)   02/11/22 59.7 kg (131 lb 11.2 oz)     Body mass index is 24.52 kg/m .     Constitutional: In no apparent distress  Respiratory: Normal inspiratory and expiratory effort  Cardiovascular: No edema.  Skin: erythematous papules bilateral dorsum of both feet, ankles, and upper left leg. Several papular lesions right upper arm.        "

## 2022-09-24 ENCOUNTER — HEALTH MAINTENANCE LETTER (OUTPATIENT)
Age: 37
End: 2022-09-24

## 2022-10-11 ENCOUNTER — OFFICE VISIT (OUTPATIENT)
Dept: FAMILY MEDICINE | Facility: CLINIC | Age: 37
End: 2022-10-11
Payer: COMMERCIAL

## 2022-10-11 VITALS
HEART RATE: 60 BPM | DIASTOLIC BLOOD PRESSURE: 60 MMHG | RESPIRATION RATE: 18 BRPM | BODY MASS INDEX: 24.82 KG/M2 | SYSTOLIC BLOOD PRESSURE: 110 MMHG | OXYGEN SATURATION: 99 % | WEIGHT: 134.6 LBS

## 2022-10-11 DIAGNOSIS — N97.9 FEMALE INFERTILITY: ICD-10-CM

## 2022-10-11 DIAGNOSIS — N30.01 ACUTE CYSTITIS WITH HEMATURIA: Primary | ICD-10-CM

## 2022-10-11 DIAGNOSIS — R30.0 DYSURIA: ICD-10-CM

## 2022-10-11 LAB
ALBUMIN UR-MCNC: NEGATIVE MG/DL
APPEARANCE UR: CLEAR
BACTERIA #/AREA URNS HPF: ABNORMAL /HPF
BILIRUB UR QL STRIP: NEGATIVE
COLOR UR AUTO: YELLOW
GLUCOSE UR STRIP-MCNC: NEGATIVE MG/DL
HGB UR QL STRIP: ABNORMAL
KETONES UR STRIP-MCNC: NEGATIVE MG/DL
LEUKOCYTE ESTERASE UR QL STRIP: ABNORMAL
NITRATE UR QL: NEGATIVE
PH UR STRIP: 6 [PH] (ref 5–8)
RBC #/AREA URNS AUTO: ABNORMAL /HPF
SP GR UR STRIP: 1.01 (ref 1–1.03)
SQUAMOUS #/AREA URNS AUTO: ABNORMAL /LPF
UROBILINOGEN UR STRIP-ACNC: 0.2 E.U./DL
WBC #/AREA URNS AUTO: ABNORMAL /HPF

## 2022-10-11 PROCEDURE — 87086 URINE CULTURE/COLONY COUNT: CPT | Performed by: NURSE PRACTITIONER

## 2022-10-11 PROCEDURE — 99214 OFFICE O/P EST MOD 30 MIN: CPT | Performed by: NURSE PRACTITIONER

## 2022-10-11 PROCEDURE — 81001 URINALYSIS AUTO W/SCOPE: CPT | Performed by: NURSE PRACTITIONER

## 2022-10-11 RX ORDER — NITROFURANTOIN 25; 75 MG/1; MG/1
100 CAPSULE ORAL 2 TIMES DAILY
Qty: 10 CAPSULE | Refills: 0 | Status: SHIPPED | OUTPATIENT
Start: 2022-10-11 | End: 2022-10-16

## 2022-10-11 NOTE — PROGRESS NOTES
Assessment and Plan:     Acute cystitis with hematuria  Dysuria  Preliminary urinalysis is suspicious for urinary tract infection.  We will treat with Macrobid 100 mg twice daily for 5 days.  Educated on its indications and side effects.  If urine culture is normal and symptoms persist, may consider CT scan to rule out nephrolithiasis.  She is to increase her fluid intake.  Discussed methods of preventing urinary tract infections in the future.  - nitroFURantoin macrocrystal-monohydrate (MACROBID) 100 MG capsule  Dispense: 10 capsule; Refill: 0  - Urine Culture Aerobic Bacterial - lab collect  - UA Macro with Reflex to Micro and Culture - lab collect  - UA Macro with Reflex to Micro and Culture - lab collect  - Urine Microscopic    Female infertility  Will refer to gynecology for further evaluation and treatment. She is content with the plan.   - Ob/Gyn Referral        Subjective:     Kelly is a 37 year old female presenting to the clinic for concerns for possible urinary tract infection.  Patient states she has a history of urinary tract infections and nephrolithiasis.  She developed dysuria yesterday.  She has been urinating small amounts.  She complains of urinary urgency and frequency.  She did take an antibiotic from 2 years ago and her symptoms improved today.  She denies hematuria, fever, low back pain.  She complains of some pelvic pressure.  She denies vaginal discharge or irritation.  Patient also requests referral to fertility specialist.  She has been trying to conceive for the past year and has been unsuccessful.    Reviewof Systems: A complete 14 point review of systems was obtained and is negative or as stated in the history of present illness.    Social History     Socioeconomic History     Marital status:      Spouse name: Not on file     Number of children: Not on file     Years of education: Not on file     Highest education level: Not on file   Occupational History     Not on file   Tobacco  Use     Smoking status: Never     Smokeless tobacco: Never   Substance and Sexual Activity     Alcohol use: No     Drug use: Never     Sexual activity: Not Currently     Birth control/protection: None   Other Topics Concern     Not on file   Social History Narrative     Not on file     Social Determinants of Health     Financial Resource Strain: Not on file   Food Insecurity: Not on file   Transportation Needs: Not on file   Physical Activity: Not on file   Stress: Not on file   Social Connections: Not on file   Intimate Partner Violence: Not on file   Housing Stability: Not on file       Active Ambulatory Problems     Diagnosis Date Noted     Idiopathic Thrombocytopenic Purpura       delivery delivered 2020     Breech birth 2020     Late latent syphilis 2020     Poor fetal growth affecting management of mother in third trimester 2020     Cervical high risk HPV (human papillomavirus) test positive 11/10/2021     Resolved Ambulatory Problems     Diagnosis Date Noted     Pregnant 2020     Past Medical History:   Diagnosis Date     Kidney stone      Varicose vein of leg        Family History   Problem Relation Age of Onset     No Known Problems Daughter      Urolithiasis No family hx of      Gout No family hx of      Clotting Disorder No family hx of      Diabetes No family hx of      Cancer No family hx of      Heart Disease No family hx of        Objective:     /60 (BP Location: Right arm, Patient Position: Sitting, Cuff Size: Adult Regular)   Pulse 60   Resp 18   Wt 61.1 kg (134 lb 9.6 oz)   SpO2 99%   BMI 24.82 kg/m      Patient is alert, in no obvious distress.   Skin: Warm, dry.    Lungs:  Clear to auscultation. Respirations even and unlabored.  No wheezing or rales noted.   Heart:  Regular rate and rhythm.  No murmurs, S3, S4, gallops, or rubs.    Abdomen: Soft, tenderness to palpation suprapubic.  No organomegaly. Bowel sounds normoactive. No guarding or masses  noted.  CVA tenderness is negative bilaterally.    LABORATORY: Urinalysis ordered showing small leukocytes and small blood.

## 2022-10-13 ENCOUNTER — TELEPHONE (OUTPATIENT)
Dept: FAMILY MEDICINE | Facility: CLINIC | Age: 37
End: 2022-10-13

## 2022-10-13 LAB — BACTERIA UR CULT: NO GROWTH

## 2022-10-13 NOTE — TELEPHONE ENCOUNTER
----- Message from ROMAN Bolanos CNP sent at 10/13/2022 12:25 PM CDT -----  Please notify the patient that her urine culture results are normal.  She does not have an infection.  If her symptoms are continuing, we can certainly pursue a CT scan to evaluate for kidney stones. If her symptoms are not present, we can certainly continue to monitor for any changes.  Thanks.

## 2022-10-27 ENCOUNTER — PATIENT OUTREACH (OUTPATIENT)
Dept: FAMILY MEDICINE | Facility: CLINIC | Age: 37
End: 2022-10-27

## 2022-10-27 NOTE — LETTER
October 27, 2022      Kelly Shannon  [address] [place] [address]        Dear ,    This letter is to remind you that you are due for your follow-up Pap smear and Human Papillomavirus (HPV) test.    Please call [phone] to schedule your appointment at your earliest convenience.    If you have completed the appointment outside of the [facility] system, please have the records forwarded to our office. We will update your chart for your provider to review before your next annual wellness visit.     Thank you for choosing [facility]!      Sincerely,    Your [facility] Care Team

## 2022-11-04 ENCOUNTER — TRANSFERRED RECORDS (OUTPATIENT)
Dept: HEALTH INFORMATION MANAGEMENT | Facility: CLINIC | Age: 37
End: 2022-11-04

## 2022-11-22 NOTE — TELEPHONE ENCOUNTER
02/25/22 [place] Bx FREDI 1, ECC No FREDI. Plan cotest in 1 year due 02/25/23 11/21/22 Reminder call-with Elis , spoke to the pt and she said that she did have a cervix check on 02/25/22.  I did review this and she did have a [place] done on 02/25/22 I added this above and informed her to return on or after 02/25/23 for a pap and HPV.

## 2023-01-05 ENCOUNTER — TELEPHONE (OUTPATIENT)
Dept: FAMILY MEDICINE | Facility: CLINIC | Age: 38
End: 2023-01-05

## 2023-01-05 ENCOUNTER — OFFICE VISIT (OUTPATIENT)
Dept: FAMILY MEDICINE | Facility: CLINIC | Age: 38
End: 2023-01-05
Payer: COMMERCIAL

## 2023-01-05 VITALS
DIASTOLIC BLOOD PRESSURE: 72 MMHG | RESPIRATION RATE: 18 BRPM | HEART RATE: 59 BPM | BODY MASS INDEX: 25.45 KG/M2 | TEMPERATURE: 97.8 F | SYSTOLIC BLOOD PRESSURE: 111 MMHG | OXYGEN SATURATION: 100 % | WEIGHT: 138 LBS

## 2023-01-05 DIAGNOSIS — Z13.9 SCREENING FOR CONDITION: ICD-10-CM

## 2023-01-05 DIAGNOSIS — Z32.01 PREGNANCY TEST POSITIVE: Primary | ICD-10-CM

## 2023-01-05 DIAGNOSIS — S39.012A STRAIN OF LUMBAR REGION, INITIAL ENCOUNTER: ICD-10-CM

## 2023-01-05 LAB
ALBUMIN UR-MCNC: NEGATIVE MG/DL
APPEARANCE UR: ABNORMAL
BACTERIA #/AREA URNS HPF: ABNORMAL /HPF
BILIRUB UR QL STRIP: NEGATIVE
COLOR UR AUTO: YELLOW
GLUCOSE UR STRIP-MCNC: NEGATIVE MG/DL
HCG UR QL: POSITIVE
HGB UR QL STRIP: ABNORMAL
KETONES UR STRIP-MCNC: NEGATIVE MG/DL
LEUKOCYTE ESTERASE UR QL STRIP: ABNORMAL
NITRATE UR QL: NEGATIVE
PH UR STRIP: 6 [PH] (ref 5–8)
RBC #/AREA URNS AUTO: ABNORMAL /HPF
SP GR UR STRIP: 1.01 (ref 1–1.03)
SQUAMOUS #/AREA URNS AUTO: ABNORMAL /LPF
UROBILINOGEN UR STRIP-ACNC: 0.2 E.U./DL
WBC #/AREA URNS AUTO: ABNORMAL /HPF
YEAST #/AREA URNS HPF: ABNORMAL /HPF

## 2023-01-05 PROCEDURE — 81025 URINE PREGNANCY TEST: CPT | Performed by: PHYSICIAN ASSISTANT

## 2023-01-05 PROCEDURE — 81001 URINALYSIS AUTO W/SCOPE: CPT | Performed by: PHYSICIAN ASSISTANT

## 2023-01-05 PROCEDURE — 99214 OFFICE O/P EST MOD 30 MIN: CPT | Performed by: PHYSICIAN ASSISTANT

## 2023-01-05 NOTE — TELEPHONE ENCOUNTER
Reason for Call:  Appointment Request    Patient requesting this type of appt:  Pregnancy     Requested provider: elmer    Reason patient unable to be scheduled: TE     When does patient want to be seen/preferred time: when appropriate    Comments: wants to be seen at [facility]    Could [facility] or would you prefer to receive a phone call?:   Patient would prefer a phone call   Okay to leave a detailed message?: Yes at Cell number on file:    Telephone Information:   Mobile [phone]       Call taken on 1/5/2023 at 10:44 AM by Jaida Wong

## 2023-01-05 NOTE — TELEPHONE ENCOUNTER
[facility] does not currently have providers that do OB care. Forwarding to [place], I know at least Brionna Monge does. Please contact patient to schedule.

## 2023-01-05 NOTE — PROGRESS NOTES
Patient presents with:  Pain: Lower back pain was carrying something a few days ago and her back starting hurting also is late getting her period so is wondering if she is pregnant       Clinical Decision Making:  Patient was concerned that she may be pregnant.  She did miss her last menstrual period.  Pregnancy test did return as positive.  Patient is instructed to start taking a prenatal vitamin as soon as possible.  Referral to OB/GYN for intake.  Patient will use simple stretching topical ice and heat and topical Voltaren gel for her low back strain since she is pregnant and wants to limit exposure of medications. Expected course of resolution and indication for return was gone over and questions were answered to patient/parent's satisfaction before discharge.    35 min spent on the date of the encounter in chart review, patient visit, review of tests, documentation and/ordiscussion with other providers about the issues documented above.  Extra time was taken with the phone  to go through the history physical and treatment plan and answer questions.       ICD-10-CM    1. Pregnancy test positive  Z32.01       2. Screening for condition  Z13.9 UA macro with reflex to Microscopic and Culture - Clinc Collect     HCG qualitative urine     HCG qualitative urine     Urine Microscopic     CANCELED: HCG qualitative      3. Strain of lumbar region, initial encounter  S39.012A diclofenac (VOLTAREN) 1 % topical gel          Patient Instructions   Pregnancy test is positive.  Ensure that you are taking a prenatal vitamin.  Make appointment with OB/GYN for evaluation and treatment.    Ice topically to the area 20 minutes on each hour while awake.  Take precautions to avoid damage to the skin.  After 2 days, transition to ice topically 20 minutes 3 times per day.  After 2 days may add heat and alternate ice and heat.  Topical diclofenac gel to the back to help with pain.      Return to see primary care provider or  return to clinic for reexamination and varinder-ray in 2 weeks if anything less than 100% resolution or return to pain-free weightbearing and full activities.          HPI:  Kelly Shannon is a 37 year old female who presents today 2 concerns.    Patient's first concern is that she is concerned that she may have pregnancy.  She is past her period date.  She is requesting a screening test for pregnancy.  Patient states first day of her last menstrual period was 2022.  Patient has not been taking prenatal vitamins or has been planning for pregnancy.    Second concern is that she has had lower back pain.  Pain is roughly a 5 out of 10 yesterday and a 3 out of 10 today.  Does not radiate except for in the bilateral lower lumbar area does not go into the buttock or the lower extremities.  All positions of standing sitting or lying are equally as good or as bad.  To the change of position going from lying to sitting or sitting to standing or getting up that causes her pain.  She has not tried treatment for this at home because she is concerned she may be pregnant.  Injury was precipitated by lifting something heavy 2 weeks ago and it is not completely resolved and now it is gotten worse over the last 2 days with activity.  No fecal or urinary incontinence no fever chills or night sweats or pelvic pain to report.  She has not had prior history of back pain and it has been self-limited.     History obtained from chart review, the patient and through the [employer]  on the phone..    Problem List:  2021: Cervical high risk HPV (human papillomavirus) test positive  2020:  delivery delivered  2020: Breech birth  2020: Late latent syphilis  2020: Poor fetal growth affecting management of mother in third   trimester  2020: Pregnant  Idiopathic Thrombocytopenic Purpura      Past Medical History:   Diagnosis Date     Cervical high risk HPV (human papillomavirus) test positive 11/10/2021    6/7/13 NIL  10/20/17 NIL pap, neg HPV 11/10/21 LSIL, +HR HPV (not 16/18). Plan: cotest in 1 year / await provider     Immune thrombocytopenic purpura (H)     Created by Conversion      Kidney stone      Varicose vein of leg        Social History     Tobacco Use     Smoking status: Never     Smokeless tobacco: Never   Substance Use Topics     Alcohol use: No       Review of Systems  As above in HPI otherwise negative.    Vitals:    01/05/23 1623   BP: 111/72   Pulse: 59   Resp: 18   Temp: 97.8  F (36.6  C)   TempSrc: Oral   SpO2: 100%   Weight: 62.6 kg (138 lb)       General: Patient is resting comfortably no acute distress is afebrile  HEENT: Head is normocephalic atraumatic   eyes are PERRL EOMI sclera anicteric   TMs are clear bilaterally  Throat is with mild pharyngeal wall erythema and no exudate  No cervical lymphadenopathy present  LUNGS: Clear to auscultation bilaterally  HEART: Regular rate and rhythm  Skin: Without rash non-diaphoretic    Physical Exam      Labs:  Results for orders placed or performed in visit on 01/05/23   UA macro with reflex to Microscopic and Culture - Clinc Collect     Status: Abnormal    Specimen: Urine, Clean Catch   Result Value Ref Range    Color Urine Yellow Colorless, Straw, Light Yellow, Yellow    Appearance Urine Slightly Cloudy (A) Clear    Glucose Urine Negative Negative mg/dL    Bilirubin Urine Negative Negative    Ketones Urine Negative Negative mg/dL    Specific Gravity Urine 1.010 1.005 - 1.030    Blood Urine Trace (A) Negative    pH Urine 6.0 5.0 - 8.0    Protein Albumin Urine Negative Negative mg/dL    Urobilinogen Urine 0.2 0.2, 1.0 E.U./dL    Nitrite Urine Negative Negative    Leukocyte Esterase Urine Small (A) Negative   HCG qualitative urine     Status: Abnormal   Result Value Ref Range    hCG Urine Qualitative Positive (A) Negative   Urine Microscopic     Status: Abnormal   Result Value Ref Range    Bacteria Urine Few (A) None Seen /HPF    RBC Urine 0-2 0-2 /HPF /HPF    WBC  Urine 0-5 0-5 /HPF /HPF    Squamous Epithelials Urine Moderate (A) None Seen /LPF    Budding Yeast Urine Few (A) None Seen /HPF    Narrative    Urine Culture not indicated       At the end of the encounter, I discussed results, diagnosis, medications. Discussed red flags for immediate return to clinic/ER, as well as indications for follow up if no improvement. Patient understood and agreed to plan. Patient was stable for discharge.

## 2023-01-05 NOTE — PATIENT INSTRUCTIONS
Pregnancy test is positive.  Ensure that you are taking a prenatal vitamin.  Make appointment with OB/GYN for evaluation and treatment.    Ice topically to the area 20 minutes on each hour while awake.  Take precautions to avoid damage to the skin.  After 2 days, transition to ice topically 20 minutes 3 times per day.  After 2 days may add heat and alternate ice and heat.  Topical diclofenac gel to the back to help with pain.      Return to see primary care provider or return to clinic for reexamination and varinder-ray in 2 weeks if anything less than 100% resolution or return to pain-free weightbearing and full activities.

## 2023-01-05 NOTE — TELEPHONE ENCOUNTER
I could see her at 3:20 on the 18th at [place] if that works for her.  If it does not, let me know.  I would let her know this is a double book.  I would schedule this as a 20-minute pregnancy confirmation, thanks.

## 2023-01-05 NOTE — TELEPHONE ENCOUNTER
Attempted to contact patient.     When patient calls back, please schedule her as Dr. Monge has advised below.

## 2023-01-09 NOTE — TELEPHONE ENCOUNTER
Patient states that she is going to be seeing Dr. Tripp for her OB care. Patient confirms she does not need an appointment.

## 2023-01-18 ENCOUNTER — PATIENT OUTREACH (OUTPATIENT)
Dept: FAMILY MEDICINE | Facility: CLINIC | Age: 38
End: 2023-01-18
Payer: COMMERCIAL

## 2023-01-18 DIAGNOSIS — R87.810 CERVICAL HIGH RISK HPV (HUMAN PAPILLOMAVIRUS) TEST POSITIVE: ICD-10-CM

## 2023-01-18 NOTE — LETTER
January 18, 2023      Kelly Shannon  [address] [place] [address]        Dear ,    This letter is to remind you that you are due for your follow-up Pap smear and Human Papillomavirus (HPV) test.    Please call [phone] to schedule your appointment at your earliest convenience.    If you have completed the appointment outside of the [facility] system, please have the records forwarded to our office. We will update your chart for your provider to review before your next annual wellness visit.     Thank you for choosing [facility]!      Sincerely,    Your [facility] Care Team

## 2023-01-31 ENCOUNTER — HOSPITAL ENCOUNTER (OUTPATIENT)
Dept: ULTRASOUND IMAGING | Facility: CLINIC | Age: 38
Discharge: HOME | End: 2023-01-31
Attending: OBSTETRICS & GYNECOLOGY | Admitting: OBSTETRICS & GYNECOLOGY
Payer: COMMERCIAL

## 2023-01-31 DIAGNOSIS — Z34.90 CURRENTLY PREGNANT: ICD-10-CM

## 2023-01-31 PROCEDURE — 76801 OB US < 14 WKS SINGLE FETUS: CPT

## 2023-02-07 ENCOUNTER — TELEPHONE (OUTPATIENT)
Dept: FAMILY MEDICINE | Facility: CLINIC | Age: 38
End: 2023-02-07
Payer: COMMERCIAL

## 2023-02-07 DIAGNOSIS — O98.119 SYPHILIS AFFECTING PREGNANCY, ANTEPARTUM: Primary | ICD-10-CM

## 2023-02-07 NOTE — TELEPHONE ENCOUNTER
OB/GYN called to report that the pt is pregnant and tested positive for syphilis. They're requesting Dr. Pereyra follow pt's syphilis treatment as they are unable to prescribe the penicillin the pt needs. They'll fax lab orders and have the pt call the clinic to schedule a lab only visit.    Marlon Hatch will submit the [facility] form since they were the ones who tested the pt.

## 2023-02-08 ENCOUNTER — TELEPHONE (OUTPATIENT)
Dept: INFECTIOUS DISEASES | Facility: CLINIC | Age: 38
End: 2023-02-08

## 2023-02-08 DIAGNOSIS — O98.119 SYPHILIS AFFECTING PREGNANCY, ANTEPARTUM: Primary | ICD-10-CM

## 2023-02-08 NOTE — TELEPHONE ENCOUNTER
Barbara called from [employer] and she faxed to [phone] per Magaly please call if you did not get those results.  Pt just had them done.    Call back [phone] ask for Barbara

## 2023-02-08 NOTE — TELEPHONE ENCOUNTER
The last syphilis test is from 2 years ago and I do not see a titer. Pt needs a new RPR with titer.

## 2023-02-08 NOTE — TELEPHONE ENCOUNTER
Pt had testing done at [facility] and [facility].  We can try and get those results    I will also place lab order so she can come in and get tested here

## 2023-02-08 NOTE — TELEPHONE ENCOUNTER
Authorizing: Karen Pereyra MD in [facility]/OB     Diagnosis: Syphilis affecting pregnancy, antepartum         Please review and advise on scheduling.

## 2023-02-12 ENCOUNTER — APPOINTMENT (OUTPATIENT)
Dept: ULTRASOUND IMAGING | Facility: CLINIC | Age: 38
End: 2023-02-12
Attending: PHYSICIAN ASSISTANT
Payer: COMMERCIAL

## 2023-02-12 ENCOUNTER — HOSPITAL ENCOUNTER (EMERGENCY)
Facility: CLINIC | Age: 38
Discharge: HOME | End: 2023-02-12
Admitting: PHYSICIAN ASSISTANT
Payer: COMMERCIAL

## 2023-02-12 VITALS
HEIGHT: 62 IN | OXYGEN SATURATION: 98 % | DIASTOLIC BLOOD PRESSURE: 56 MMHG | BODY MASS INDEX: 25.91 KG/M2 | RESPIRATION RATE: 16 BRPM | TEMPERATURE: 97.8 F | WEIGHT: 140.8 LBS | SYSTOLIC BLOOD PRESSURE: 117 MMHG | HEART RATE: 67 BPM

## 2023-02-12 DIAGNOSIS — O20.0 THREATENED MISCARRIAGE: ICD-10-CM

## 2023-02-12 LAB
ALBUMIN UR-MCNC: NEGATIVE MG/DL
AMORPH CRY #/AREA URNS HPF: ABNORMAL /HPF
APPEARANCE UR: ABNORMAL
BILIRUB UR QL STRIP: NEGATIVE
COLOR UR AUTO: ABNORMAL
ERYTHROCYTE [DISTWIDTH] IN BLOOD BY AUTOMATED COUNT: 13.3 % (ref 10–15)
GLUCOSE UR STRIP-MCNC: NEGATIVE MG/DL
HCG SERPL-ACNC: ABNORMAL MLU/ML (ref 0–4)
HCT VFR BLD AUTO: 34.2 % (ref 35–47)
HGB BLD-MCNC: 11.7 G/DL (ref 11.7–15.7)
HGB UR QL STRIP: ABNORMAL
KETONES UR STRIP-MCNC: NEGATIVE MG/DL
LEUKOCYTE ESTERASE UR QL STRIP: ABNORMAL
MCH RBC QN AUTO: 29.4 PG (ref 26.5–33)
MCHC RBC AUTO-ENTMCNC: 34.2 G/DL (ref 31.5–36.5)
MCV RBC AUTO: 86 FL (ref 78–100)
MUCOUS THREADS #/AREA URNS LPF: PRESENT /LPF
NITRATE UR QL: NEGATIVE
PH UR STRIP: 7 [PH] (ref 5–7)
PLATELET # BLD AUTO: 225 10E3/UL (ref 150–450)
RBC # BLD AUTO: 3.98 10E6/UL (ref 3.8–5.2)
RBC URINE: 0 /HPF
SP GR UR STRIP: 1.01 (ref 1–1.03)
SQUAMOUS EPITHELIAL: 4 /HPF
UROBILINOGEN UR STRIP-MCNC: <2 MG/DL
WBC # BLD AUTO: 8.1 10E3/UL (ref 4–11)
WBC URINE: 0 /HPF
YEAST #/AREA URNS HPF: ABNORMAL /HPF

## 2023-02-12 PROCEDURE — 84702 CHORIONIC GONADOTROPIN TEST: CPT | Performed by: PHYSICIAN ASSISTANT

## 2023-02-12 PROCEDURE — 85027 COMPLETE CBC AUTOMATED: CPT | Performed by: PHYSICIAN ASSISTANT

## 2023-02-12 PROCEDURE — 81001 URINALYSIS AUTO W/SCOPE: CPT | Performed by: PHYSICIAN ASSISTANT

## 2023-02-12 PROCEDURE — 76801 OB US < 14 WKS SINGLE FETUS: CPT

## 2023-02-12 PROCEDURE — 99285 EMERGENCY DEPT VISIT HI MDM: CPT | Mod: 25

## 2023-02-12 PROCEDURE — 36415 COLL VENOUS BLD VENIPUNCTURE: CPT | Performed by: PHYSICIAN ASSISTANT

## 2023-02-12 ASSESSMENT — ENCOUNTER SYMPTOMS
HEMATURIA: 0
FREQUENCY: 0
DYSURIA: 0
DIARRHEA: 0
NAUSEA: 0
FEVER: 0
ABDOMINAL PAIN: 1
VOMITING: 0
CHILLS: 0

## 2023-02-12 ASSESSMENT — ACTIVITIES OF DAILY LIVING (ADL): ADLS_ACUITY_SCORE: 35

## 2023-02-12 NOTE — DISCHARGE INSTRUCTIONS
Ultrasound looks good. Baby continues to do well.   For now, we wait and see. Follow up with OBGYN in 2-3 days for repeat HCG level.   You also have yeast in our urine which is likely actually contamination from vagina.   Discuss with your OBGYN if this should be treated even though you are having no symptoms of this.   If you develop fevers, heavy vaginal bleeding, abdominal pain, return to emergency department.

## 2023-02-12 NOTE — ED PROVIDER NOTES
EMERGENCY DEPARTMENT ENCOUNTER      NAME: Kelly Shannon  AGE: 37 year old female  YOB: 1985  MRN: [id number]  EVALUATION DATE & TIME: 2023  1:30 PM    PCP: Karen Pereyra    ED PROVIDER: Hope South PA-C      Chief Complaint   Patient presents with     Vaginal Bleeding - Pregnant         FINAL IMPRESSION:  1. Threatened miscarriage          MEDICAL DECISION MAKING:    Pertinent Labs & Imaging studies reviewed. (See chart for details)  37 year old female presents to the Emergency Department for evaluation of vaginal bleeding onset today.  11 weeks gestation. Denies fevers. Mild abdominal cramping.     Vitals reviewed and unremarkable. On physical exam patient is well appearing and in no acute distress. Abdomen is soft and non-tender. No rebound or guarding. Differential diagnosis includes but not limited to multiple types of , ectopic, molar pregnancy, vaginal laceration/trauma.    Hemodynamically stable on presentation and throughout ED stay.  Quantitative HCG obtained with a value of 143,211 today.  Rh positive and therefore Rhogam not indicated.  Transvaginal ultrasound demonstrates living intrauterine pregnancy. This presentation represents a threatened . Hgb 11.7. Bleeding has stopped. Pt is stable for discharge at this time. Counseling regarding the incidence and etiologies of threatened miscarriage in addition to supportive reassurance is provided. Advised to follow up with obstetrician in 2-3 days for repeat HCG and to return to the ED for heavier bleeding with passage of tissues, dizziness, fever or uncontrolled abdominal pain. She does have small amount of yeast on UA. Suspect likely contamination from vagina. She denies any white discharge or pruritis. She will discuss with OBGYN when she follows up in 2-3 days to determine if treatment is indicated. Patient expresses understanding and discharged home in stable condition       Medical Decision  Making    History:    Supplemental history from: Family Member/Significant Other    External Record(s) reviewed: Documented in chart, if applicable.    Work Up:    Chart documentation includes differential considered and any EKGs or imaging independently interpreted by provider, where specified.    In additional to work up documented, I considered the following work up: Documented in chart, if applicable.    External consultation:    Discussion of management with another provider: Documented in chart, if applicable    Complicating factors:    Care impacted by chronic illness: N/A    Care affected by social determinants of health: N/A    Disposition considerations: Discharge. No recommendations on prescription strength medication(s). I considered admission, but ultimately discharged patient after reassuring work up.      0 minutes of critical care time     ED COURSE:  1:50 PM I met with the patient, obtained history, performed an initial exam, and discussed options and plan for diagnostics and treatment here in the ED.  4:42 PM Patient discharged after being provided with extensive anticipatory guidance and given return precautions, importance of PCP follow-up emphasized.    At the conclusion of the encounter I discussed the results of all of the tests and the disposition. The questions were answered. The patient and family acknowledged understanding and were agreeable with the care plan.     MEDICATIONS GIVEN IN THE EMERGENCY:  Medications - No data to display    NEW PRESCRIPTIONS STARTED AT TODAY'S ER VISIT  Discharge Medication List as of 2/12/2023  4:46 PM               =================================================================    HPI:    Patient information was obtained from: Patient and     Use of Interpretor: N/A      Kelly Shannon is a 37 year old female with a pertinent history of ITP, late latent syphilis, HPV who presents to this ED via private vehicle for evaluation of vaginal bleeding.     Per  chart review: US 23 single IUP at 9w3d.   RH positive.    Patient is 11 weeks pregnant and had onset of vaginal bleeding this morning. , no history of miscarriages. Pregnancy uncomplicated thus far other than some light vaginal bleeding. She is not passing clots. Mild lower abdominal cramping bilaterally. Denies any fevers, chills, vomiting, diarrhea, urinary symptoms. OBGYN though [facility].     REVIEW OF SYSTEMS:  Review of Systems   Constitutional: Negative for chills and fever.   Gastrointestinal: Positive for abdominal pain. Negative for diarrhea, nausea and vomiting.   Genitourinary: Positive for vaginal bleeding. Negative for dysuria, frequency and hematuria.   All other systems reviewed and are negative.      PAST MEDICAL HISTORY:  Past Medical History:   Diagnosis Date     Cervical high risk HPV (human papillomavirus) test positive 11/10/2021    6/7/13 NIL 10/20/17 NIL pap, neg HPV 11/10/21 LSIL, +HR HPV (not 16/18). Plan: cotest in 1 year / await provider     Immune thrombocytopenic purpura (H)     Created by Conversion      Kidney stone      Varicose vein of leg        PAST SURGICAL HISTORY:  Past Surgical History:   Procedure Laterality Date      SECTION N/A 2020    Procedure: PRIMARY  SECTION;  Surgeon: Brenda Whitney MD;  Location: [facility]D OR;  Service: Obstetrics     NO PAST SURGERIES             CURRENT MEDICATIONS:    No current facility-administered medications for this encounter.    Current Outpatient Medications:      diclofenac (VOLTAREN) 1 % topical gel, Apply 2 grams to left hip up to 4 times daily not to exceed 8 grams per day., Disp: 100 g, Rfl: 1      ALLERGIES:  No Known Allergies    FAMILY HISTORY:  Family History   Problem Relation Age of Onset     No Known Problems Daughter      Urolithiasis No family hx of      Gout No family hx of      Clotting Disorder No family hx of      Diabetes No family hx of      Cancer No family hx of       "Heart Disease No family hx of        SOCIAL HISTORY:   Social History     Socioeconomic History     Marital status:    Tobacco Use     Smoking status: Never     Smokeless tobacco: Never   Substance and Sexual Activity     Alcohol use: No     Drug use: Never     Sexual activity: Not Currently     Birth control/protection: None       VITALS:  Patient Vitals for the past 24 hrs:   BP Temp Temp src Pulse Resp SpO2 Height Weight   02/12/23 1630 117/56 -- -- 67 -- 98 % -- --   02/12/23 1609 108/57 -- -- 66 -- 99 % -- --   02/12/23 1443 108/64 -- -- 70 -- 99 % -- --   02/12/23 1436 108/59 -- -- 68 -- 99 % -- --   02/12/23 1318 127/77 97.8  F (36.6  C) Temporal 66 16 99 % 1.575 m (5' 2\") 63.9 kg (140 lb 12.8 oz)       PHYSICAL EXAM  Constitutional: Well developed, Well nourished, NAD  HENT: Normocephalic, Atraumatic  Neck: Supple, No stridor.  Eyes: Conjunctiva normal, No discharge.   Respiratory: Normal breath sounds, No respiratory distress, No wheezing, Speaks full sentences easily. No cough.  Cardiovascular: Normal heart rate, Regular rhythm, No murmurs, No rubs, No gallops. Chest wall nontender.  GI: Soft, No tenderness, No masses, No flank tenderness. No rebound or guarding.  Musculoskeletal: 2+ DP pulses. No edema. No cyanosis, No clubbing. Good range of motion in all major joints.  Integument: Warm, Dry, No erythema, No rash. No petechiae.  Neurologic: Alert & oriented x 3, Normal motor function, Normal sensory function, No focal deficits noted. Normal gait.  Psychiatric: Affect normal, Judgment normal, Mood normal. Cooperative.    LAB:  All pertinent labs reviewed and interpreted.  Recent Results (from the past 24 hour(s))   CBC (+ platelets, no diff)    Collection Time: 02/12/23  2:25 PM   Result Value Ref Range    WBC Count 8.1 4.0 - 11.0 10e3/uL    RBC Count 3.98 3.80 - 5.20 10e6/uL    Hemoglobin 11.7 11.7 - 15.7 g/dL    Hematocrit 34.2 (L) 35.0 - 47.0 %    MCV 86 78 - 100 fL    MCH 29.4 26.5 - 33.0 pg    " MCHC 34.2 31.5 - 36.5 g/dL    RDW 13.3 10.0 - 15.0 %    Platelet Count 225 150 - 450 10e3/uL   HCG quantitative pregnancy (blood)    Collection Time: 02/12/23  2:25 PM   Result Value Ref Range    hCG Quantitative 143,211 (H) 0 - 4 mlU/mL   UA with Microscopic reflex to Culture    Collection Time: 02/12/23  3:26 PM    Specimen: Urine, Clean Catch   Result Value Ref Range    Color Urine Light Yellow Colorless, Straw, Light Yellow, Yellow    Appearance Urine Cloudy (A) Clear    Glucose Urine Negative Negative mg/dL    Bilirubin Urine Negative Negative    Ketones Urine Negative Negative mg/dL    Specific Gravity Urine 1.012 1.001 - 1.030    Blood Urine 0.5 mg/dL (A) Negative    pH Urine 7.0 5.0 - 7.0    Protein Albumin Urine Negative Negative mg/dL    Urobilinogen Urine <2.0 <2.0 mg/dL    Nitrite Urine Negative Negative    Leukocyte Esterase Urine 25 Cipriano/uL (A) Negative    Budding Yeast Urine Few (A) None Seen /HPF    Mucus Urine Present (A) None Seen /LPF    Amorphous Crystals Urine Few (A) None Seen /HPF    RBC Urine 0 <=2 /HPF    WBC Urine 0 <=5 /HPF    Squamous Epithelials Urine 4 (H) <=1 /HPF         RADIOLOGY:  Reviewed all pertinent imaging. Please see official radiology report.  OB  US 1st trimester w transvag   Final Result   IMPRESSION:       1.  Single living intrauterine gestation at 11 weeks 5 days, EDC 08/29/2023.      2.  No subchorionic hemorrhage.             Hope South PA-C  Emergency Medicine  [facility]  2/12/2023     Hope South PA-C  02/12/23 [id number]

## 2023-02-15 ENCOUNTER — APPOINTMENT (OUTPATIENT)
Dept: INTERPRETER SERVICES | Facility: CLINIC | Age: 38
End: 2023-02-15
Payer: COMMERCIAL

## 2023-02-15 NOTE — TELEPHONE ENCOUNTER
Patient due for Pap and HPV.    Reminder done today via telephone call-lm [facility]  Kelly

## 2023-02-24 NOTE — TELEPHONE ENCOUNTER
Attn: Dr. Pereyra care team    Infectious Disease is not able to schedule with out medical records. Will reassign order back to ordering provider to obtain records.

## 2023-03-16 ENCOUNTER — MYC MEDICAL ADVICE (OUTPATIENT)
Dept: FAMILY MEDICINE | Facility: CLINIC | Age: 38
End: 2023-03-16

## 2023-03-16 ENCOUNTER — OFFICE VISIT (OUTPATIENT)
Dept: FAMILY MEDICINE | Facility: CLINIC | Age: 38
End: 2023-03-16
Payer: COMMERCIAL

## 2023-03-16 VITALS
HEART RATE: 71 BPM | BODY MASS INDEX: 26.58 KG/M2 | OXYGEN SATURATION: 98 % | TEMPERATURE: 98.1 F | WEIGHT: 140.8 LBS | DIASTOLIC BLOOD PRESSURE: 70 MMHG | HEIGHT: 61 IN | RESPIRATION RATE: 16 BRPM | SYSTOLIC BLOOD PRESSURE: 110 MMHG

## 2023-03-16 DIAGNOSIS — O98.119 SYPHILIS AFFECTING PREGNANCY, ANTEPARTUM: Primary | ICD-10-CM

## 2023-03-16 PROCEDURE — 86592 SYPHILIS TEST NON-TREP QUAL: CPT | Performed by: FAMILY MEDICINE

## 2023-03-16 PROCEDURE — 86780 TREPONEMA PALLIDUM: CPT | Performed by: FAMILY MEDICINE

## 2023-03-16 PROCEDURE — 99213 OFFICE O/P EST LOW 20 MIN: CPT | Mod: 25 | Performed by: FAMILY MEDICINE

## 2023-03-16 PROCEDURE — 96372 THER/PROPH/DIAG INJ SC/IM: CPT | Performed by: FAMILY MEDICINE

## 2023-03-16 PROCEDURE — 86593 SYPHILIS TEST NON-TREP QUANT: CPT | Performed by: FAMILY MEDICINE

## 2023-03-16 PROCEDURE — 36415 COLL VENOUS BLD VENIPUNCTURE: CPT | Performed by: FAMILY MEDICINE

## 2023-03-16 ASSESSMENT — PAIN SCALES - GENERAL: PAINLEVEL: NO PAIN (0)

## 2023-03-16 NOTE — PROGRESS NOTES
"  Assessment & Plan     Syphilis affecting pregnancy, antepartum  Patient without symptoms.  Do not have records of test results from OB provider.  Will obtain syphilis testing today.  Elected to give IM penicillin today in clinic after blood draw and refer to infectious disease for further management of syphilis affecting pregnancy.  - Treponema Abs w Reflex to RPR and Titer  - penicillin G benzathine (BICILLIN L-A) injection 2.4 Million Units  - Infectious Disease Referral  - Treponema Abs w Reflex to RPR and Titer               BMI:   Estimated body mass index is 26.41 kg/m  as calculated from the following:    Height as of this encounter: 1.555 m (5' 1.22\").    Weight as of this encounter: 63.9 kg (140 lb 12.8 oz).           No follow-ups on file.    Karen Pereyra MD  [facility]    Lance Mendoza is a 38 year old, presenting for the following health issues:  Imm/Inj      HPI   She is seen with the assistance of a telephone .  She is here today to follow-up on syphilis.  She is currently pregnant.  Followed by Dr. Tripp.  Had tested positive for syphilis at Dr. Tripp's office.  No records available.  Was advised to see PCP for treatment.  Patient reports that she did have syphilis during a past pregnancy.  She was treated with IM penicillin in 2020.  She is not currently having any symptoms of concern.        Review of Systems         Objective    /70 (BP Location: Left arm, Patient Position: Sitting, Cuff Size: Adult Regular)   Pulse 71   Temp 98.1  F (36.7  C) (Temporal)   Resp 16   Ht 1.555 m (5' 1.22\")   Wt 63.9 kg (140 lb 12.8 oz)   LMP 11/27/2022   SpO2 98%   BMI 26.41 kg/m    Body mass index is 26.41 kg/m .  Physical Exam   GENERAL: healthy, alert and no distress  PSYCH: mentation appears normal, affect normal/bright                    "

## 2023-03-17 LAB
RPR SER QL: REACTIVE
RPR SER-TITR: NORMAL {TITER}
T PALLIDUM AB SER QL: REACTIVE

## 2023-03-17 NOTE — TELEPHONE ENCOUNTER
Patient requesting referral be made for infectious disease. Writer sent MyChart back to patient with information regarding referral that Dr. Pereyra put in for [facility] infectious disease and phone number to call and schedule her appointment.     LULI QuachN, RN  [facility]

## 2023-03-20 ENCOUNTER — APPOINTMENT (OUTPATIENT)
Dept: INTERPRETER SERVICES | Facility: CLINIC | Age: 38
End: 2023-03-20
Payer: COMMERCIAL

## 2023-04-12 ENCOUNTER — LAB (OUTPATIENT)
Dept: LAB | Facility: CLINIC | Age: 38
End: 2023-04-12
Payer: COMMERCIAL

## 2023-04-12 ENCOUNTER — OFFICE VISIT (OUTPATIENT)
Dept: INFECTIOUS DISEASES | Facility: CLINIC | Age: 38
End: 2023-04-12
Attending: FAMILY MEDICINE
Payer: COMMERCIAL

## 2023-04-12 VITALS
WEIGHT: 144.7 LBS | HEART RATE: 66 BPM | OXYGEN SATURATION: 98 % | SYSTOLIC BLOOD PRESSURE: 100 MMHG | BODY MASS INDEX: 27.14 KG/M2 | DIASTOLIC BLOOD PRESSURE: 68 MMHG

## 2023-04-12 DIAGNOSIS — O98.119 SYPHILIS AFFECTING PREGNANCY, ANTEPARTUM: ICD-10-CM

## 2023-04-12 PROCEDURE — 36415 COLL VENOUS BLD VENIPUNCTURE: CPT

## 2023-04-12 PROCEDURE — 87389 HIV-1 AG W/HIV-1&-2 AB AG IA: CPT

## 2023-04-12 PROCEDURE — 96372 THER/PROPH/DIAG INJ SC/IM: CPT | Performed by: INTERNAL MEDICINE

## 2023-04-12 RX ORDER — NITROFURANTOIN 25; 75 MG/1; MG/1
CAPSULE ORAL
COMMUNITY
Start: 2023-04-11

## 2023-04-12 NOTE — PROGRESS NOTES
General ID Service Consult      Patient: Kelly Shannon  YOB: 1985, MRN: [id number]  Date of Admission:  (Not on file)  Date of Consult: 04/12/2023  Consult Requested by: Karen Pereyra MD  Admission Diagnosis: Syphilis affecting pregnancy, antepartum [O98.119]      ID Assessment & Plan    38 years old pregnant, third child, 20 weeks  Syphilis unknown duration  Treated 2020 with one injection. RPR was non reactive, but TPPA positive  Was pregnant with fiurst child 2004 , was not told she was positive     PLAN  Finish Bicillin LA second shot, then third in one week  Repeat RPR in 6-month  Follow-up clinic in 6 months  HIV test today    Jose R Moyer MD    ______________________________________________________________________        History of Present Illness   Kelly Shannon is a 38 year old female gravid, third child, 20 weeks was presenting with a positive test for syphilis.  She has positive antibody with RPR 1: 1  .  In 2020 while pregnant with her second child she did test positive with positive DTPA TP PA but nonreactive RPR.  She did get 1 shot of Bicillin LA and at the time her  also got 1 shot apparently was not tested at that time  This time her  was tested and he was negative but he did get penicillin anyway.  Patient got Bicillin injection as well on March 16.  Then she was referred here  Clinically she is asymptomatic.  She does not report any recent body rash.  No recent oral ulcers or genital ulcers.  No other known cardiac disease.  Again she is not aware of any prior syphilis issues before her second pregnancy.      Review of Systems   The 10 point Review of Systems is negative other than noted in the HPI or here.     Past Medical History    Past Medical History:   Diagnosis Date     Cervical high risk HPV (human papillomavirus) test positive 11/10/2021    6/7/13 NIL 10/20/17 NIL pap, neg HPV 11/10/21 LSIL, +HR HPV (not 16/18). Plan: cotest in 1 year / await provider      Immune thrombocytopenic purpura (H)     Created by Conversion      Kidney stone      Varicose vein of leg        Past Surgical History   Past Surgical History:   Procedure Laterality Date      SECTION N/A 2020    Procedure: PRIMARY  SECTION;  Surgeon: Brenda Whitney MD;  Location: [facility] OR;  Service: Obstetrics     NO PAST SURGERIES         Social History   Social History     Tobacco Use     Smoking status: Never     Smokeless tobacco: Never   Substance Use Topics     Alcohol use: No     Drug use: Never       Family History   I have reviewed this patient's family history and updated it with pertinent information if needed.  Family History   Problem Relation Age of Onset     No Known Problems Daughter      Urolithiasis No family hx of      Gout No family hx of      Clotting Disorder No family hx of      Diabetes No family hx of      Cancer No family hx of      Heart Disease No family hx of        Medications   I have reviewed this patient's current medications    Allergies   No Known Allergies    Physical Exam   Vital Signs:     BP: 100/68 Pulse: 66     SpO2: 98 %      Weight: 144 lbs 11.2 oz    Gen. appearance nontoxic  Eyes no conjunctivitis or icterus  Oral no ulcers  Neck no stiffness or neck vein distention, no LN  Heart no edema  Lungs clear  Abdomen soft not tender  Extremities no synovitis, trace edema  Skin  no rash or emboli  Neurologic alert oriented no focal deficits      Data   Inflammatory Markers No lab results found.     Hematology Studies   Recent Labs   Lab Test 23  1425 20  0805 20  1339 20  0755 20  1137 19  0825   WBC 8.1  --   --  9.9 5.8 6.9   HGB 11.7 7.9* 9.6* 13.0 12.5 13.1   MCV 86  --   --  89 89 87     --   --  211 220 235       Metabolic Studies   Recent Labs   Lab Test 20  0755 19  0943 19  0825   NA  --   --  138   POTASSIUM  --   --  3.5   CHLORIDE  --   --  102   CO2  --   --  26    BUN  --   --  9   CR 0.71 0.71 0.71   GFRESTIMATED >60 >60 >60       Hepatic Studies    Recent Labs   Lab Test 12/11/20  0755 11/24/19  0825   BILITOTAL  --  0.8   ALKPHOS  --  62   ALBUMIN  --  4.2   AST 15 15   ALT 10 10       Most Recent 6 Bacteria Isolates From Any Culture (See EPIC Reports for Culture Details):No lab results found.    Urine Studies    Recent Labs   Lab Test 02/12/23  1526 01/05/23  1650 10/11/22  1400 12/13/20  1350 12/08/20  1708   LEUKEST 25 Cipriano/uL* Small* Small* Negative Small*   WBCU 0 0-5 0-5 5-10* 0-5       Vancomycin Levels  No lab results found.    Invalid input(s): VANCO    Hepatitis B Testing No lab results found.  Hepatitis C Testing     Hepatitis C Antibody   Date Value Ref Range Status   07/15/2011 Negative (Negative) Final     HIVTesting No lab results found.    Respiratory Virus Testing    No results found for: RS, FLUAG  COVID-19 Antibody Results, Testing for Immunity         No data to display            COVID-19 PCR Results         No data to display

## 2023-04-12 NOTE — TELEPHONE ENCOUNTER
"Copied and pasted per result note of the provider,  \"Let's try one more letter through my chart\"       "

## 2023-04-12 NOTE — TELEPHONE ENCOUNTER
Dennys Pereyra,   Pt is currently due for a cotest but is currently pregnant. Do you recommend any further contact attempts or okay for lost to follow up now? Please advise.     Pap HX:  6/7/13 NIL  10/20/17 NIL pap, neg HPV  11/10/21 LSIL pap, + HR HPV (not 16/18). Plan: cotest in 1 year okay per provider  02/25/22 [place] Bx FREDI 1, [facility] No FREDI. Plan cotest in 1 year due 02/25/23 12/6/21 left detailed message per pt request that provider okay with 1 year cotest  10/27/22 Reminder Jordant, Letter  11/21/22 Reminder call-with [employer] , spoke to the pt and she said that she did have a cervix check on 02/25/22.  I did review this and she did have a [place] done on 02/25/22 I added this above and informed her to return on or after 02/25/23 for a pap and HPV.    01/18/23 Reminder Jordant, Letter   02/15/23 Reminder call-lm with the help of [facility] [employer]  Penny.    03/16/23 appt--notes added--not done pt currently pregnant

## 2023-04-13 LAB — HIV 1+2 AB+HIV1 P24 AG SERPL QL IA: NONREACTIVE

## 2023-04-19 ENCOUNTER — ALLIED HEALTH/NURSE VISIT (OUTPATIENT)
Dept: ENDOCRINOLOGY | Facility: CLINIC | Age: 38
End: 2023-04-19
Payer: COMMERCIAL

## 2023-04-19 DIAGNOSIS — A52.8 LATE LATENT SYPHILIS: Primary | ICD-10-CM

## 2023-04-19 PROCEDURE — 99207 PR NO CHARGE NURSE ONLY: CPT

## 2023-04-19 PROCEDURE — 96372 THER/PROPH/DIAG INJ SC/IM: CPT | Performed by: INTERNAL MEDICINE

## 2023-05-08 NOTE — TELEPHONE ENCOUNTER
Dennys Pereyra,   Patient is lost to pap tracking follow-up. Attempts to contact pt have been made per reminder process and there has been no reply and/or no appt scheduled.  If you are wanting any additional contact attempts please send to your care team staff.     Pap Hx:  6/7/13 NIL  10/20/17 NIL pap, neg HPV  11/10/21 LSIL pap, + HR HPV (not 16/18). Plan: cotest in 1 year okay per provider  02/25/22 [place] Bx FREDI 1, E[facility] No FREDI. Plan cotest in 1 year due 02/25/23 12/6/21 left detailed message per pt request that provider okay with 1 year cotest  10/27/22 Reminder Jordant, Letter  11/21/22 Reminder call-with [employer] , spoke to the pt and she said that she did have a cervix check on 02/25/22.  I did review this and she did have a [place] done on 02/25/22 I added this above and informed her to return on or after 02/25/23 for a pap and HPV.    01/18/23 Reminder Mychart, Letter   02/15/23 Reminder call-lm with the help of [facility] [employer]  Penny.    03/16/23 appt--notes added--not done pt currently pregnant   04/12/23 message sent to the provider to see if any further contacts to be made provider would like one more reminder through [employer]. Reminder Mychart sent  05/08/23 Lost to follow-up for pap tracking, renan routed to provider

## 2023-06-19 ENCOUNTER — TELEPHONE (OUTPATIENT)
Dept: INFECTIOUS DISEASES | Facility: CLINIC | Age: 38
End: 2023-06-19

## 2023-06-19 NOTE — TELEPHONE ENCOUNTER
Patient called. She just delivered her baby and per hospital discharge, she is supposed to follow up with ID in 6-10 days. Does she need a follow up with Dr. Moyer?

## 2023-06-19 NOTE — TELEPHONE ENCOUNTER
Pt was seen at [facility] and should contact ID there. Pt was to have a 6 mo follow up from her last visit here. I am unsure why she would need to follow up in 6-10 day, but again she should contact [facility] to figure that out.

## 2023-09-16 ENCOUNTER — MEDICAL CORRESPONDENCE (OUTPATIENT)
Dept: HEALTH INFORMATION MANAGEMENT | Facility: CLINIC | Age: 38
End: 2023-09-16

## 2023-09-29 ENCOUNTER — LAB (OUTPATIENT)
Dept: LAB | Facility: CLINIC | Age: 38
End: 2023-09-29
Payer: COMMERCIAL

## 2023-09-29 DIAGNOSIS — O98.119 SYPHILIS AFFECTING PREGNANCY, ANTEPARTUM: ICD-10-CM

## 2023-09-29 LAB — T PALLIDUM AB SER QL: REACTIVE

## 2023-09-29 PROCEDURE — 86592 SYPHILIS TEST NON-TREP QUAL: CPT

## 2023-09-29 PROCEDURE — 36415 COLL VENOUS BLD VENIPUNCTURE: CPT

## 2023-09-29 PROCEDURE — 99000 SPECIMEN HANDLING OFFICE-LAB: CPT

## 2023-09-29 PROCEDURE — 86780 TREPONEMA PALLIDUM: CPT | Mod: 90

## 2023-09-29 PROCEDURE — 86780 TREPONEMA PALLIDUM: CPT

## 2023-10-02 LAB — RPR SER QL: NONREACTIVE

## 2023-10-04 ENCOUNTER — OFFICE VISIT (OUTPATIENT)
Dept: INFECTIOUS DISEASES | Facility: CLINIC | Age: 38
End: 2023-10-04
Payer: COMMERCIAL

## 2023-10-04 VITALS
DIASTOLIC BLOOD PRESSURE: 78 MMHG | OXYGEN SATURATION: 98 % | HEART RATE: 60 BPM | BODY MASS INDEX: 26.17 KG/M2 | SYSTOLIC BLOOD PRESSURE: 116 MMHG | TEMPERATURE: 97.7 F | HEIGHT: 61 IN | WEIGHT: 138.6 LBS

## 2023-10-04 DIAGNOSIS — A53.9 SYPHILIS: Primary | ICD-10-CM

## 2023-10-04 LAB — T PALLIDUM AB SER QL AGGL: REACTIVE

## 2023-10-04 PROCEDURE — 99213 OFFICE O/P EST LOW 20 MIN: CPT | Performed by: INTERNAL MEDICINE

## 2023-10-04 NOTE — PROGRESS NOTES
"[facility] Infectious Disease  Followup Visit        Assessment:     Syphilis unknown duration   \"negative\"  Treated  with one injection. RPR was non reactive, but TPPA positive.  Was pregnant with fiurst child  , was not told she was positive   Recently () treated with 3 shots of penicillin RPR was 1: 1  HIV neg  Recent pregnancy     Plan:   At this point RPR is nonreactive,   Suggesting response  Plan is to repeat RPR in 6-month  Order written    Jose R Moyer M.D.            Present Illness:     38 years old female recently delivered baby in , .  We had seen her back in April with a positive syphilis antibody with RPR 1: 1.  We retreated her with Bicillin LA and she did get 3 injections.  She is, for follow-up and her repeat RPR is nonreactive  Remains asymptomatic  She reiterates that her  was negative    Review of Systems  Performed and all negative except as mentioned above.    Past medical, family, and social history reviewed, unchanged from before.        Physical Exam:     Gen. appearance nontoxic  Eyes no conjunctivitis or icterus  Neck no stiffness or neck vein distention, no LN  Heart  no S3 or murmurs  Lungs clear no wheeze  Extremities no synovitis, trace edema  Skin  no rash or emboli  Neurologic alert oriented no focal deficits      DATA   No results found for any visits on 10/04/23.             Component Ref Range & Units 5 d ago 6 mo ago    Rapid Plasma Reagin Nonreactive Nonreactive Reactive Abnormal           Jose R Moyer MD    "

## 2023-11-08 ENCOUNTER — OFFICE VISIT (OUTPATIENT)
Dept: FAMILY MEDICINE | Facility: CLINIC | Age: 38
End: 2023-11-08
Payer: MEDICAID

## 2023-11-08 VITALS
SYSTOLIC BLOOD PRESSURE: 101 MMHG | OXYGEN SATURATION: 97 % | WEIGHT: 135 LBS | DIASTOLIC BLOOD PRESSURE: 64 MMHG | RESPIRATION RATE: 20 BRPM | BODY MASS INDEX: 25.3 KG/M2 | TEMPERATURE: 97.9 F | HEART RATE: 63 BPM

## 2023-11-08 DIAGNOSIS — R11.2 NAUSEA AND VOMITING, UNSPECIFIED VOMITING TYPE: Primary | ICD-10-CM

## 2023-11-08 PROCEDURE — 99213 OFFICE O/P EST LOW 20 MIN: CPT | Performed by: FAMILY MEDICINE

## 2023-11-08 RX ORDER — ONDANSETRON 4 MG/1
4 TABLET, ORALLY DISINTEGRATING ORAL ONCE
Status: COMPLETED | OUTPATIENT
Start: 2023-11-08 | End: 2023-11-08

## 2023-11-08 RX ORDER — ONDANSETRON 4 MG/1
4 TABLET, ORALLY DISINTEGRATING ORAL EVERY 8 HOURS PRN
Qty: 20 TABLET | Refills: 0 | Status: SHIPPED | OUTPATIENT
Start: 2023-11-08

## 2023-11-08 RX ADMIN — ONDANSETRON 4 MG: 4 TABLET, ORALLY DISINTEGRATING ORAL at 18:10

## 2023-11-09 NOTE — PROGRESS NOTES
Assessment:       Nausea and vomiting, unspecified vomiting type  - ondansetron (ZOFRAN ODT) ODT tab 4 mg  - ondansetron (ZOFRAN ODT) 4 MG ODT tab  Dispense: 20 tablet; Refill: 0       Plan:     Patient with nausea and vomiting, slight relief of symptoms after being given Zofran here in the clinic.  Appears clinically stable.  Prescription given for Zofran to take at home.  Discussed using oral rehydration solution drinking small amounts of fluids frequently and monitor urine output carefully.  If not having much urine output over the next 4 to 6 hours or developing high fevers, abdominal pain, lightheadedness, dizziness, severe headache, patient should go to the ER for further evaluation and IV fluids.  Patient agreeable with this plan.    MEDICATIONS:   Orders Placed This Encounter   Medications    ondansetron (ZOFRAN ODT) ODT tab 4 mg    ondansetron (ZOFRAN ODT) 4 MG ODT tab     Sig: Take 1 tablet (4 mg) by mouth every 8 hours as needed for nausea     Dispense:  20 tablet     Refill:  0       Subjective:       38 year old female presents for evaluation of a 6-hour history of nausea and vomiting.  She has not urinated since this morning.  Denies lightheadedness or dizziness.  No abdominal pain or fevers.  Not had any diarrhea.    Patient Active Problem List   Diagnosis    Idiopathic Thrombocytopenic Purpura     delivery delivered    Breech birth    Late latent syphilis    Poor fetal growth affecting management of mother in third trimester    Cervical high risk HPV (human papillomavirus) test positive       Past Medical History:   Diagnosis Date    Cervical high risk HPV (human papillomavirus) test positive 11/10/2021    6/7/13 NIL 10/20/17 NIL pap, neg HPV 11/10/21 LSIL, +HR HPV (not 16/18). Plan: cotest in 1 year / await provider    Immune thrombocytopenic purpura (H)     Created by Conversion     Kidney stone     Varicose vein of leg        Past Surgical History:   Procedure Laterality Date      SECTION N/A 2020    Procedure: PRIMARY  SECTION;  Surgeon: Brenda Whitney MD;  Location: [facility];  Service: Obstetrics    NO PAST SURGERIES         Current Outpatient Medications   Medication    diclofenac (VOLTAREN) 1 % topical gel    nitroFURantoin macrocrystal-monohydrate (MACROBID) 100 MG capsule    ondansetron (ZOFRAN ODT) 4 MG ODT tab    Prenatal Vit-Fe Fumarate-FA (PRENATAL MULTIVITAMIN  PLUS IRON) 27-1 MG TABS     No current facility-administered medications for this visit.       No Known Allergies    Family History   Problem Relation Age of Onset    No Known Problems Daughter     Urolithiasis No family hx of     Gout No family hx of     Clotting Disorder No family hx of     Diabetes No family hx of     Cancer No family hx of     Heart Disease No family hx of        Social History     Socioeconomic History    Marital status:      Spouse name: None    Number of children: None    Years of education: None    Highest education level: None   Tobacco Use    Smoking status: Never    Smokeless tobacco: Never   Substance and Sexual Activity    Alcohol use: No    Drug use: Never    Sexual activity: Not Currently     Birth control/protection: None         Review of Systems  Pertinent items are noted in HPI.      Objective:     /64   Pulse 63   Temp 97.9  F (36.6  C) (Oral)   Resp 20   Wt 61.2 kg (135 lb)   LMP 10/18/2022 (Approximate)   SpO2 97%   Breastfeeding Unknown   BMI 25.30 kg/m      General Appearance:      Alert, mildly ill-appearing, nontoxic appearing.   Head:    Normocephalic, without obvious abnormality, atraumatic   Eyes:    Conjunctiva/corneas clear   Ears:    Normal TM's without erythema or bulging. Normal external ear canals, both ears   Nose:   Nares normal, septum midline, mucosa normal, no drainage    or sinus tenderness   Throat:   Lips, mucosa, and tongue normal; teeth and gums normal.  No tonsilar hypertrophy or exudate.    Neck:    Cardiovascular:   Supple, symmetrical, trachea midline, no adenopathy;     thyroid:  no enlargement/tenderness/nodules  Regular rate and rhythm, no murmurs, rubs, or gallops.   Lungs:    Abdomen:  Extremities:  Skin:         Clear to auscultation bilaterally without wheezes, rales, or rhonchi, respirations unlabored  Soft, nontender  Warm and well perfused  No rashes       This note has been dictated using voice recognition software. Any grammatical or context distortions are unintentional and inherent to the software

## 2023-12-17 ENCOUNTER — HEALTH MAINTENANCE LETTER (OUTPATIENT)
Age: 38
End: 2023-12-17

## 2023-12-27 ENCOUNTER — MEDICAL CORRESPONDENCE (OUTPATIENT)
Dept: HEALTH INFORMATION MANAGEMENT | Facility: CLINIC | Age: 38
End: 2023-12-27
Payer: MEDICAID

## 2024-02-25 ENCOUNTER — HEALTH MAINTENANCE LETTER (OUTPATIENT)
Age: 39
End: 2024-02-25

## 2024-03-05 ENCOUNTER — LAB REQUISITION (OUTPATIENT)
Dept: LAB | Facility: CLINIC | Age: 39
End: 2024-03-05
Payer: COMMERCIAL

## 2024-03-05 DIAGNOSIS — Z32.01 ENCOUNTER FOR PREGNANCY TEST, RESULT POSITIVE: ICD-10-CM

## 2024-03-05 LAB
ABO/RH(D): NORMAL
ANTIBODY SCREEN: NEGATIVE
BASOPHILS # BLD AUTO: 0 10E3/UL (ref 0–0.2)
BASOPHILS NFR BLD AUTO: 0 %
EOSINOPHIL # BLD AUTO: 0.1 10E3/UL (ref 0–0.7)
EOSINOPHIL NFR BLD AUTO: 2 %
ERYTHROCYTE [DISTWIDTH] IN BLOOD BY AUTOMATED COUNT: 12.2 % (ref 10–15)
HCT VFR BLD AUTO: 40.6 % (ref 35–47)
HGB BLD-MCNC: 13.4 G/DL (ref 11.7–15.7)
IMM GRANULOCYTES # BLD: 0 10E3/UL
IMM GRANULOCYTES NFR BLD: 0 %
LYMPHOCYTES # BLD AUTO: 2.5 10E3/UL (ref 0.8–5.3)
LYMPHOCYTES NFR BLD AUTO: 33 %
MCH RBC QN AUTO: 29.1 PG (ref 26.5–33)
MCHC RBC AUTO-ENTMCNC: 33 G/DL (ref 31.5–36.5)
MCV RBC AUTO: 88 FL (ref 78–100)
MONOCYTES # BLD AUTO: 0.6 10E3/UL (ref 0–1.3)
MONOCYTES NFR BLD AUTO: 9 %
NEUTROPHILS # BLD AUTO: 4.2 10E3/UL (ref 1.6–8.3)
NEUTROPHILS NFR BLD AUTO: 56 %
NRBC # BLD AUTO: 0 10E3/UL
NRBC BLD AUTO-RTO: 0 /100
PLATELET # BLD AUTO: 297 10E3/UL (ref 150–450)
RBC # BLD AUTO: 4.61 10E6/UL (ref 3.8–5.2)
SPECIMEN EXPIRATION DATE: NORMAL
WBC # BLD AUTO: 7.6 10E3/UL (ref 4–11)

## 2024-03-05 PROCEDURE — 87086 URINE CULTURE/COLONY COUNT: CPT | Mod: ORL | Performed by: NURSE PRACTITIONER

## 2024-03-05 PROCEDURE — 86593 SYPHILIS TEST NON-TREP QUANT: CPT | Mod: ORL | Performed by: NURSE PRACTITIONER

## 2024-03-05 PROCEDURE — 87491 CHLMYD TRACH DNA AMP PROBE: CPT | Mod: ORL | Performed by: NURSE PRACTITIONER

## 2024-03-05 PROCEDURE — 85025 COMPLETE CBC W/AUTO DIFF WBC: CPT | Mod: ORL | Performed by: NURSE PRACTITIONER

## 2024-03-05 PROCEDURE — 87389 HIV-1 AG W/HIV-1&-2 AB AG IA: CPT | Mod: ORL | Performed by: NURSE PRACTITIONER

## 2024-03-05 PROCEDURE — 86803 HEPATITIS C AB TEST: CPT | Mod: ORL | Performed by: NURSE PRACTITIONER

## 2024-03-05 PROCEDURE — 86592 SYPHILIS TEST NON-TREP QUAL: CPT | Mod: ORL | Performed by: NURSE PRACTITIONER

## 2024-03-05 PROCEDURE — 86762 RUBELLA ANTIBODY: CPT | Mod: ORL | Performed by: NURSE PRACTITIONER

## 2024-03-05 PROCEDURE — 86900 BLOOD TYPING SEROLOGIC ABO: CPT | Mod: ORL | Performed by: NURSE PRACTITIONER

## 2024-03-05 PROCEDURE — 87340 HEPATITIS B SURFACE AG IA: CPT | Mod: ORL | Performed by: NURSE PRACTITIONER

## 2024-03-05 PROCEDURE — 86780 TREPONEMA PALLIDUM: CPT | Mod: ORL | Performed by: NURSE PRACTITIONER

## 2024-03-06 LAB
BACTERIA UR CULT: NORMAL
C TRACH DNA SPEC QL PROBE+SIG AMP: NEGATIVE
HCV AB SERPL QL IA: NONREACTIVE
HIV 1+2 AB+HIV1 P24 AG SERPL QL IA: NONREACTIVE
N GONORRHOEA DNA SPEC QL NAA+PROBE: NEGATIVE
RPR SER QL: REACTIVE
RPR SER-TITR: NORMAL {TITER}
T PALLIDUM AB SER QL: REACTIVE

## 2024-03-07 LAB
HBV SURFACE AG SERPL QL IA: NONREACTIVE
RUBV IGG SERPL QL IA: 5.28 INDEX
RUBV IGG SERPL QL IA: POSITIVE

## 2024-04-04 ENCOUNTER — APPOINTMENT (OUTPATIENT)
Dept: ULTRASOUND IMAGING | Facility: CLINIC | Age: 39
End: 2024-04-04
Attending: EMERGENCY MEDICINE
Payer: COMMERCIAL

## 2024-04-04 ENCOUNTER — LAB REQUISITION (OUTPATIENT)
Dept: LAB | Facility: CLINIC | Age: 39
End: 2024-04-04

## 2024-04-04 ENCOUNTER — HOSPITAL ENCOUNTER (EMERGENCY)
Facility: CLINIC | Age: 39
Discharge: HOME | End: 2024-04-04
Attending: EMERGENCY MEDICINE | Admitting: EMERGENCY MEDICINE
Payer: COMMERCIAL

## 2024-04-04 VITALS
SYSTOLIC BLOOD PRESSURE: 132 MMHG | TEMPERATURE: 97.6 F | HEART RATE: 66 BPM | WEIGHT: 138.7 LBS | RESPIRATION RATE: 16 BRPM | DIASTOLIC BLOOD PRESSURE: 74 MMHG | OXYGEN SATURATION: 98 % | HEIGHT: 66 IN | BODY MASS INDEX: 22.29 KG/M2

## 2024-04-04 DIAGNOSIS — O26.891 ABDOMINAL PAIN DURING PREGNANCY IN FIRST TRIMESTER: ICD-10-CM

## 2024-04-04 DIAGNOSIS — R30.0 DYSURIA: ICD-10-CM

## 2024-04-04 DIAGNOSIS — N13.30 HYDRONEPHROSIS, UNSPECIFIED HYDRONEPHROSIS TYPE: ICD-10-CM

## 2024-04-04 DIAGNOSIS — R10.9 ABDOMINAL PAIN DURING PREGNANCY IN FIRST TRIMESTER: ICD-10-CM

## 2024-04-04 LAB
ALBUMIN SERPL BCG-MCNC: 4.3 G/DL (ref 3.5–5.2)
ALBUMIN UR-MCNC: NEGATIVE MG/DL
ALP SERPL-CCNC: 59 U/L (ref 40–150)
ALT SERPL W P-5'-P-CCNC: 9 U/L (ref 0–50)
ANION GAP SERPL CALCULATED.3IONS-SCNC: 11 MMOL/L (ref 7–15)
APPEARANCE UR: CLEAR
AST SERPL W P-5'-P-CCNC: 17 U/L (ref 0–45)
BACTERIA #/AREA URNS HPF: ABNORMAL /HPF
BASOPHILS # BLD AUTO: 0 10E3/UL (ref 0–0.2)
BASOPHILS NFR BLD AUTO: 0 %
BILIRUB SERPL-MCNC: 0.3 MG/DL
BILIRUB UR QL STRIP: NEGATIVE
BUN SERPL-MCNC: 17.8 MG/DL (ref 6–20)
CALCIUM SERPL-MCNC: 10.1 MG/DL (ref 8.6–10)
CHLORIDE SERPL-SCNC: 99 MMOL/L (ref 98–107)
COLOR UR AUTO: COLORLESS
CREAT SERPL-MCNC: 0.71 MG/DL (ref 0.51–0.95)
CRP SERPL-MCNC: 3.82 MG/L
DEPRECATED HCO3 PLAS-SCNC: 23 MMOL/L (ref 22–29)
EGFRCR SERPLBLD CKD-EPI 2021: >90 ML/MIN/1.73M2
EOSINOPHIL # BLD AUTO: 0.5 10E3/UL (ref 0–0.7)
EOSINOPHIL NFR BLD AUTO: 4 %
ERYTHROCYTE [DISTWIDTH] IN BLOOD BY AUTOMATED COUNT: 12.4 % (ref 10–15)
GLUCOSE SERPL-MCNC: 109 MG/DL (ref 70–99)
GLUCOSE UR STRIP-MCNC: NEGATIVE MG/DL
HCT VFR BLD AUTO: 37.7 % (ref 35–47)
HGB BLD-MCNC: 13.1 G/DL (ref 11.7–15.7)
HGB UR QL STRIP: NEGATIVE
IMM GRANULOCYTES # BLD: 0 10E3/UL
IMM GRANULOCYTES NFR BLD: 0 %
KETONES UR STRIP-MCNC: NEGATIVE MG/DL
LEUKOCYTE ESTERASE UR QL STRIP: NEGATIVE
LYMPHOCYTES # BLD AUTO: 3.5 10E3/UL (ref 0.8–5.3)
LYMPHOCYTES NFR BLD AUTO: 34 %
MCH RBC QN AUTO: 29.2 PG (ref 26.5–33)
MCHC RBC AUTO-ENTMCNC: 34.7 G/DL (ref 31.5–36.5)
MCV RBC AUTO: 84 FL (ref 78–100)
MONOCYTES # BLD AUTO: 0.8 10E3/UL (ref 0–1.3)
MONOCYTES NFR BLD AUTO: 7 %
NEUTROPHILS # BLD AUTO: 5.6 10E3/UL (ref 1.6–8.3)
NEUTROPHILS NFR BLD AUTO: 54 %
NITRATE UR QL: NEGATIVE
NRBC # BLD AUTO: 0 10E3/UL
NRBC BLD AUTO-RTO: 0 /100
PH UR STRIP: 6.5 [PH] (ref 5–7)
PLATELET # BLD AUTO: 256 10E3/UL (ref 150–450)
POTASSIUM SERPL-SCNC: 3.9 MMOL/L (ref 3.4–5.3)
PROT SERPL-MCNC: 7.7 G/DL (ref 6.4–8.3)
RBC # BLD AUTO: 4.48 10E6/UL (ref 3.8–5.2)
RBC URINE: 1 /HPF
SODIUM SERPL-SCNC: 133 MMOL/L (ref 135–145)
SP GR UR STRIP: 1.01 (ref 1–1.03)
SQUAMOUS EPITHELIAL: 1 /HPF
UROBILINOGEN UR STRIP-MCNC: <2 MG/DL
WBC # BLD AUTO: 10.4 10E3/UL (ref 4–11)
WBC URINE: <1 /HPF

## 2024-04-04 PROCEDURE — 86140 C-REACTIVE PROTEIN: CPT | Performed by: EMERGENCY MEDICINE

## 2024-04-04 PROCEDURE — 80053 COMPREHEN METABOLIC PANEL: CPT | Performed by: EMERGENCY MEDICINE

## 2024-04-04 PROCEDURE — 87086 URINE CULTURE/COLONY COUNT: CPT | Performed by: NURSE PRACTITIONER

## 2024-04-04 PROCEDURE — 99285 EMERGENCY DEPT VISIT HI MDM: CPT | Mod: 25

## 2024-04-04 PROCEDURE — 81001 URINALYSIS AUTO W/SCOPE: CPT | Performed by: EMERGENCY MEDICINE

## 2024-04-04 PROCEDURE — 36415 COLL VENOUS BLD VENIPUNCTURE: CPT | Performed by: EMERGENCY MEDICINE

## 2024-04-04 PROCEDURE — 76770 US EXAM ABDO BACK WALL COMP: CPT

## 2024-04-04 PROCEDURE — 250N000011 HC RX IP 250 OP 636: Performed by: EMERGENCY MEDICINE

## 2024-04-04 PROCEDURE — 87491 CHLMYD TRACH DNA AMP PROBE: CPT | Performed by: NURSE PRACTITIONER

## 2024-04-04 PROCEDURE — 96374 THER/PROPH/DIAG INJ IV PUSH: CPT

## 2024-04-04 PROCEDURE — 250N000013 HC RX MED GY IP 250 OP 250 PS 637: Performed by: EMERGENCY MEDICINE

## 2024-04-04 PROCEDURE — 85004 AUTOMATED DIFF WBC COUNT: CPT | Performed by: EMERGENCY MEDICINE

## 2024-04-04 PROCEDURE — 76801 OB US < 14 WKS SINGLE FETUS: CPT

## 2024-04-04 PROCEDURE — 87086 URINE CULTURE/COLONY COUNT: CPT | Performed by: EMERGENCY MEDICINE

## 2024-04-04 RX ORDER — DIMENHYDRINATE 50 MG/ML
25 INJECTION, SOLUTION INTRAMUSCULAR; INTRAVENOUS ONCE
Status: COMPLETED | OUTPATIENT
Start: 2024-04-04 | End: 2024-04-04

## 2024-04-04 RX ORDER — ACETAMINOPHEN 325 MG/1
975 TABLET ORAL ONCE
Status: COMPLETED | OUTPATIENT
Start: 2024-04-04 | End: 2024-04-04

## 2024-04-04 RX ADMIN — ACETAMINOPHEN 975 MG: 325 TABLET ORAL at 01:50

## 2024-04-04 RX ADMIN — DIMENHYDRINATE 25 MG: 50 INJECTION, SOLUTION INTRAMUSCULAR; INTRAVENOUS at 01:51

## 2024-04-04 ASSESSMENT — COLUMBIA-SUICIDE SEVERITY RATING SCALE - C-SSRS
6. HAVE YOU EVER DONE ANYTHING, STARTED TO DO ANYTHING, OR PREPARED TO DO ANYTHING TO END YOUR LIFE?: NO
1. IN THE PAST MONTH, HAVE YOU WISHED YOU WERE DEAD OR WISHED YOU COULD GO TO SLEEP AND NOT WAKE UP?: NO
2. HAVE YOU ACTUALLY HAD ANY THOUGHTS OF KILLING YOURSELF IN THE PAST MONTH?: NO

## 2024-04-04 ASSESSMENT — ACTIVITIES OF DAILY LIVING (ADL): ADLS_ACUITY_SCORE: 33

## 2024-04-04 NOTE — ED PROVIDER NOTES
EMERGENCY DEPARTMENT ENCOUnter      NAME: Kelly Shannon  AGE: 39 year old female  YOB: 1985  MRN: [id number]  EVALUATION DATE & TIME: No admission date for patient encounter.    PCP: Clinic, [facility]    ED PROVIDER: Valeri Mo MD      Chief Complaint   Patient presents with    Abdominal Pain    Urinary Frequency         FINAL IMPRESSION:  1. Abdominal pain during pregnancy in first trimester    2. Hydronephrosis, unspecified hydronephrosis type          ED COURSE & MEDICAL DECISION MAKING:      In summary, the patient is a 39-year-old 10-week pregnant female that presents to the emergency department for evaluation of urinary frequency and abdominal pain.  Patient is noted to have left-sided hydronephrosis which could be contributing to her discomfort.  I think it is unlikely ureterolithiasis since she has no hematuria appreciated, although she does have a history of kidney stones.  She has no evidence of urinary tract infection.    0104- I met with the patient, obtained history, performed an initial exam, and discussed options and plan for diagnostics and treatment here in the ED. Tylenol 975 mg p.o. was administered for pain.  Dramamine 25 mg IV was administered for nausea.  0259- I rechecked and updated patient on results.  Reevaluation reveals that her pain is improved in the emergency department.  We discussed the plan for discharge and the patient is agreeable. Reviewed supportive cares, symptomatic treatment, outpatient follow up, and reasons to return to the Emergency Department. Patient to be discharged by ED RN.     Medical Decision Making  Obtained supplemental history:Supplemental history obtained?: Documented in chart  Reviewed external records: External records reviewed?: Documented in chart  Care impacted by chronic illness:N/A  Care significantly affected by social determinants of health:NA  Did you consider but not order tests?: Work up considered but not  performed and documented in chart, if applicable  Did you interpret images independently?: Independent interpretation of ECG and images noted in documentation, when applicable.  Consultation discussion with other provider:Did you involve another provider (consultant, MH, pharmacy, etc.)?: No  Discharge. No recommendations on prescription strength medication(s). See documentation for any additional details.      At the conclusion of the encounter I discussed the results of all of the tests and the disposition. The questions were answered. The patient or family acknowledged understanding and was agreeable with the care plan.         MEDICATIONS GIVEN IN THE EMERGENCY:  Medications   acetaminophen (TYLENOL) tablet 975 mg (975 mg Oral $Given 4/4/24 0150)   dimenhyDRINATE (DRAMAMINE) injection 25 mg (25 mg Intravenous $Given 4/4/24 0151)       NEW PRESCRIPTIONS STARTED AT TODAY'S ER VISIT  New Prescriptions    No medications on file          =================================================================    HPI        Kelly Shannon is a 39 year old female with no pertinent history who presents to this ED via walk-in for evaluation of abdominal pain, urinary frequency.    Patient reports lower abdominal pain that started last night around 1700. She describes pain as a tightness sensation. Patient reports associating urinary frequency, left flank pain, nausea, and vomiting. Patient mentions she took Ibuprofen prior to ED arrival. She is concerned about UTI or kidney stone. Of note, patient mentions she is 10 weeks pregnant. She has seen her OB and had an ultrasound done, which was normal. Otherwise, she denies any fevers and hematuria. Patient denies any significant medical history. No allergies to medications. Patient does not smoke or drink alcohol. No other complaints at this time.    REVIEW OF SYSTEMS     Constitutional:  Denies fever or chills  HENT:  Denies sore throat   Respiratory:  Denies cough or shortness of  breath   Cardiovascular:  Denies chest pain or palpitations  GI:  Positive for abdominal pain, nausea, and vomiting  Musculoskeletal:  Denies any new extremity pain. Positive for left flank pain  Skin:  Denies rash   Neurologic:  Denies headache, focal weakness or sensory changes    All other systems reviewed and are negative      PAST MEDICAL HISTORY:  Past Medical History:   Diagnosis Date    Cervical high risk HPV (human papillomavirus) test positive 11/10/2021    6/7/13 NIL 10/20/17 NIL pap, neg HPV 11/10/21 LSIL, +HR HPV (not 16/18). Plan: cotest in 1 year / await provider    Immune thrombocytopenic purpura (H)     Created by Conversion     Kidney stone     Varicose vein of leg        PAST SURGICAL HISTORY:  Past Surgical History:   Procedure Laterality Date     SECTION N/A 2020    Procedure: PRIMARY  SECTION;  Surgeon: Brenda Whitney MD;  Location: [facility] OR;  Service: Obstetrics    NO PAST SURGERIES             CURRENT MEDICATIONS:    Prenatal Vit-Fe Fumarate-FA (PRENATAL MULTIVITAMIN  PLUS IRON) 27-1 MG TABS  diclofenac (VOLTAREN) 1 % topical gel  nitroFURantoin macrocrystal-monohydrate (MACROBID) 100 MG capsule  ondansetron (ZOFRAN ODT) 4 MG ODT tab        ALLERGIES:  No Known Allergies    FAMILY HISTORY:  Family History   Problem Relation Age of Onset    No Known Problems Daughter     Urolithiasis No family hx of     Gout No family hx of     Clotting Disorder No family hx of     Diabetes No family hx of     Cancer No family hx of     Heart Disease No family hx of        SOCIAL HISTORY:   Social History     Socioeconomic History    Marital status:      Spouse name: None    Number of children: None    Years of education: None    Highest education level: None   Tobacco Use    Smoking status: Never    Smokeless tobacco: Never   Substance and Sexual Activity    Alcohol use: No    Drug use: Never    Sexual activity: Not Currently     Birth control/protection: None  "      VITALS:  Patient Vitals for the past 24 hrs:   BP Temp Temp src Pulse Resp SpO2 Height Weight   04/04/24 0044 (!) 146/75 97.6  F (36.4  C) Oral 69 15 100 % 1.664 m (5' 5.5\") 62.9 kg (138 lb 11.2 oz)       PHYSICAL EXAM    Constitutional:  Well developed, Well nourished,  HENT:  Normocephalic, Atraumatic, Bilateral external ears normal, Oropharynx moist, Nose normal.   Neck:  Normal range of motion, No meningismus, No stridor.   Eyes:  EOMI, Conjunctiva normal, No discharge.   Respiratory:  Normal breath sounds, No respiratory distress, No wheezing, No chest tenderness.   Cardiovascular:  Normal heart rate, Normal rhythm, No murmurs  GI:  Soft, mild suprapubic tenderness, No guarding, No CVA tenderness.   Musculoskeletal:  Neurovascularly intact distally, No edema, No tenderness, No cyanosis, Good range of motion in all major joints. No tenderness to palpation or major deformities noted.   Integument:  Warm, Dry, No erythema, No rash.   Lymphatic:  No lymphadenopathy noted.   Neurologic:  Alert & oriented x 3, Normal motor function, No focal deficits noted.   Psychiatric:  Affect normal, Judgment normal, Mood normal.      LAB:  All pertinent labs reviewed and interpreted.  Results for orders placed or performed during the hospital encounter of 04/04/24   US Renal Complete Non-Vascular    Impression    IMPRESSION:  1.  Mild left hydronephrosis.    2.  Otherwise, negative renal ultrasound.   US OB < 14 Weeks w Transvaginal    Impression    IMPRESSION:   1.  Single living intrauterine gestation at 10 weeks 4 days, EDC 10/27/2024.    2.  Corpus luteum cyst right ovary. Otherwise, unremarkable.       UA with Microscopic reflex to Culture    Specimen: Urine, Clean Catch   Result Value Ref Range    Color Urine Colorless Colorless, Straw, Light Yellow, Yellow    Appearance Urine Clear Clear    Glucose Urine Negative Negative mg/dL    Bilirubin Urine Negative Negative    Ketones Urine Negative Negative mg/dL    Specific " Gravity Urine 1.006 1.001 - 1.030    Blood Urine Negative Negative    pH Urine 6.5 5.0 - 7.0    Protein Albumin Urine Negative Negative mg/dL    Urobilinogen Urine <2.0 <2.0 mg/dL    Nitrite Urine Negative Negative    Leukocyte Esterase Urine Negative Negative    Bacteria Urine Few (A) None Seen /HPF    RBC Urine 1 <=2 /HPF    WBC Urine <1 <=5 /HPF    Squamous Epithelials Urine 1 <=1 /HPF   Comprehensive metabolic panel   Result Value Ref Range    Sodium 133 (L) 135 - 145 mmol/L    Potassium 3.9 3.4 - 5.3 mmol/L    Carbon Dioxide (CO2) 23 22 - 29 mmol/L    Anion Gap 11 7 - 15 mmol/L    Urea Nitrogen 17.8 6.0 - 20.0 mg/dL    Creatinine 0.71 0.51 - 0.95 mg/dL    GFR Estimate >90 >60 mL/min/1.73m2    Calcium 10.1 (H) 8.6 - 10.0 mg/dL    Chloride 99 98 - 107 mmol/L    Glucose 109 (H) 70 - 99 mg/dL    Alkaline Phosphatase 59 40 - 150 U/L    AST 17 0 - 45 U/L    ALT 9 0 - 50 U/L    Protein Total 7.7 6.4 - 8.3 g/dL    Albumin 4.3 3.5 - 5.2 g/dL    Bilirubin Total 0.3 <=1.2 mg/dL   Result Value Ref Range    CRP Inflammation 3.82 <5.00 mg/L   CBC with platelets and differential   Result Value Ref Range    WBC Count 10.4 4.0 - 11.0 10e3/uL    RBC Count 4.48 3.80 - 5.20 10e6/uL    Hemoglobin 13.1 11.7 - 15.7 g/dL    Hematocrit 37.7 35.0 - 47.0 %    MCV 84 78 - 100 fL    MCH 29.2 26.5 - 33.0 pg    MCHC 34.7 31.5 - 36.5 g/dL    RDW 12.4 10.0 - 15.0 %    Platelet Count 256 150 - 450 10e3/uL    % Neutrophils 54 %    % Lymphocytes 34 %    % Monocytes 7 %    % Eosinophils 4 %    % Basophils 0 %    % Immature Granulocytes 0 %    NRBCs per 100 WBC 0 <1 /100    Absolute Neutrophils 5.6 1.6 - 8.3 10e3/uL    Absolute Lymphocytes 3.5 0.8 - 5.3 10e3/uL    Absolute Monocytes 0.8 0.0 - 1.3 10e3/uL    Absolute Eosinophils 0.5 0.0 - 0.7 10e3/uL    Absolute Basophils 0.0 0.0 - 0.2 10e3/uL    Absolute Immature Granulocytes 0.0 <=0.4 10e3/uL    Absolute NRBCs 0.0 10e3/uL       RADIOLOGY:  I have independently reviewed and interpreted the above  imaging, pending the final radiology read.  US Renal Complete Non-Vascular   Final Result   IMPRESSION:   1.  Mild left hydronephrosis.      2.  Otherwise, negative renal ultrasound.      US OB < 14 Weeks w Transvaginal   Final Result   IMPRESSION:    1.  Single living intrauterine gestation at 10 weeks 4 days, EDC 10/27/2024.      2.  Corpus luteum cyst right ovary. Otherwise, unremarkable.                        I, Kadi Cook, am serving as a scribe to document services personally performed by Dr. Mo based on my observation and the provider's statements to me. I, Valeri Mo MD attest that Kadi Cook is acting in a scribe capacity, has observed my performance of the services and has documented them in accordance with my direction.    Valeri Mo MD  Emergency Medicine  [facility] EMERGENCY ROOM  [address] [address]  [phone]  Dept: [phone]     Valeri Mo MD  04/04/24 0314

## 2024-04-04 NOTE — DISCHARGE INSTRUCTIONS
Tylenol 650 mg every 4 hours as needed for pain  Please follow-up with your primary care provider within the next 1 to 2 days for recheck  Follow-up with urology to further evaluate the hydronephrosis, which is dilation of the collecting system of your kidney.  Your urine does not look like it has infection.  I did send a urine culture and we will call you if there is bacteria in your urine and you require antibiotic

## 2024-04-04 NOTE — ED TRIAGE NOTES
Pt reports that she started to have pain about 1700 tonight. Pt thinks it may be a UTI d/t urgency and frequency. However pt also reports that she has had a kidney stone in the past.  States she took Ibuprofen PTA     Triage Assessment (Adult)       Row Name 04/04/24 0048          Triage Assessment    Airway WDL WDL        Respiratory WDL    Respiratory WDL WDL        Skin Circulation/Temperature WDL    Skin Circulation/Temperature WDL WDL        Cardiac WDL    Cardiac WDL WDL        Peripheral/Neurovascular WDL    Peripheral Neurovascular WDL WDL        Cognitive/Neuro/Behavioral WDL    Cognitive/Neuro/Behavioral WDL WDL

## 2024-04-05 LAB — BACTERIA UR CULT: NORMAL

## 2024-04-06 LAB
BACTERIA UR CULT: NO GROWTH
C TRACH DNA SPEC QL PROBE+SIG AMP: NEGATIVE
N GONORRHOEA DNA SPEC QL NAA+PROBE: NEGATIVE

## 2024-04-06 NOTE — TELEPHONE ENCOUNTER
Please follow-up With your GI doctor on Monday as scheduled.  Please also discuss the thrush with your immunologist.  Take the nystatin swish and swallow as prescribed.  You may take the clotrimazole lozenges in place of this nystatin or combine with the nystatin.  Take the omeprazole daily as prescribed.  You may use the BMX (Benadryl, Maalox, Xylocaine) solution as needed for esophageal pain, as this can help numb your esophagus.  Return to the emergency department for fevers, worsening shortness of breath or chest pain, worsening abdominal pain, or if you have any questions or concerns.   Please see RN message below.

## 2024-09-05 ENCOUNTER — MEDICAL CORRESPONDENCE (OUTPATIENT)
Dept: HEALTH INFORMATION MANAGEMENT | Facility: CLINIC | Age: 39
End: 2024-09-05
Payer: COMMERCIAL

## 2024-09-10 ENCOUNTER — HOSPITAL ENCOUNTER (OUTPATIENT)
Facility: CLINIC | Age: 39
End: 2024-09-10
Admitting: OBSTETRICS & GYNECOLOGY
Payer: COMMERCIAL

## 2024-09-10 ENCOUNTER — HOSPITAL ENCOUNTER (OUTPATIENT)
Facility: CLINIC | Age: 39
Discharge: ANOTHER HOSPITAL | End: 2024-09-10
Attending: OBSTETRICS & GYNECOLOGY | Admitting: OBSTETRICS & GYNECOLOGY
Payer: COMMERCIAL

## 2024-09-10 ENCOUNTER — APPOINTMENT (OUTPATIENT)
Dept: ULTRASOUND IMAGING | Facility: HOSPITAL | Age: 39
End: 2024-09-10
Attending: STUDENT IN AN ORGANIZED HEALTH CARE EDUCATION/TRAINING PROGRAM
Payer: COMMERCIAL

## 2024-09-10 ENCOUNTER — HOSPITAL ENCOUNTER (INPATIENT)
Facility: HOSPITAL | Age: 39
LOS: 5 days | Discharge: HOME | End: 2024-09-15
Attending: OBSTETRICS & GYNECOLOGY | Admitting: OBSTETRICS & GYNECOLOGY
Payer: COMMERCIAL

## 2024-09-10 VITALS
BODY MASS INDEX: 26.68 KG/M2 | WEIGHT: 145 LBS | DIASTOLIC BLOOD PRESSURE: 77 MMHG | HEIGHT: 62 IN | SYSTOLIC BLOOD PRESSURE: 122 MMHG | TEMPERATURE: 97.7 F | HEART RATE: 71 BPM | RESPIRATION RATE: 16 BRPM

## 2024-09-10 DIAGNOSIS — O34.219 VBAC (VAGINAL BIRTH AFTER CESAREAN): Primary | ICD-10-CM

## 2024-09-10 PROBLEM — Z36.89 ENCOUNTER FOR TRIAGE IN PREGNANT PATIENT: Status: ACTIVE | Noted: 2024-09-10

## 2024-09-10 PROBLEM — O42.919 PRETERM PREMATURE RUPTURE OF MEMBRANES (PPROM) WITH UNKNOWN ONSET OF LABOR: Status: ACTIVE | Noted: 2024-09-10

## 2024-09-10 LAB
ABO/RH(D): NORMAL
ALBUMIN UR-MCNC: NEGATIVE MG/DL
ANTIBODY SCREEN: NEGATIVE
APPEARANCE UR: CLEAR
BILIRUB UR QL STRIP: NEGATIVE
CLUE CELLS: ABNORMAL
COLOR UR AUTO: ABNORMAL
GLUCOSE BLDC GLUCOMTR-MCNC: 103 MG/DL (ref 70–99)
GLUCOSE BLDC GLUCOMTR-MCNC: 109 MG/DL (ref 70–99)
GLUCOSE BLDC GLUCOMTR-MCNC: 124 MG/DL (ref 70–99)
GLUCOSE BLDC GLUCOMTR-MCNC: 153 MG/DL (ref 70–99)
GLUCOSE BLDC GLUCOMTR-MCNC: 175 MG/DL (ref 70–99)
GLUCOSE BLDC GLUCOMTR-MCNC: 87 MG/DL (ref 70–99)
GLUCOSE UR STRIP-MCNC: NEGATIVE MG/DL
HGB BLD-MCNC: 11.4 G/DL (ref 11.7–15.7)
HGB UR QL STRIP: ABNORMAL
KETONES UR STRIP-MCNC: ABNORMAL MG/DL
LEUKOCYTE ESTERASE UR QL STRIP: NEGATIVE
MUCOUS THREADS #/AREA URNS LPF: PRESENT /LPF
NITRATE UR QL: NEGATIVE
PH UR STRIP: 7.5 [PH] (ref 5–7)
PLATELET # BLD AUTO: 158 10E3/UL (ref 150–450)
RBC URINE: 4 /HPF
RUPTURE OF FETAL MEMBRANES BY ROM PLUS: POSITIVE
SP GR UR STRIP: 1.01 (ref 1–1.03)
SPECIMEN EXPIRATION DATE: NORMAL
SQUAMOUS EPITHELIAL: <1 /HPF
T PALLIDUM AB SER QL: REACTIVE
TRICHOMONAS, WET PREP: ABNORMAL
UROBILINOGEN UR STRIP-MCNC: <2 MG/DL
WBC URINE: 2 /HPF
WBC'S/HIGH POWER FIELD, WET PREP: ABNORMAL
YEAST, WET PREP: ABNORMAL

## 2024-09-10 PROCEDURE — 85049 AUTOMATED PLATELET COUNT: CPT | Performed by: OBSTETRICS & GYNECOLOGY

## 2024-09-10 PROCEDURE — 87653 STREP B DNA AMP PROBE: CPT | Performed by: OBSTETRICS & GYNECOLOGY

## 2024-09-10 PROCEDURE — 76819 FETAL BIOPHYS PROFIL W/O NST: CPT

## 2024-09-10 PROCEDURE — 82962 GLUCOSE BLOOD TEST: CPT

## 2024-09-10 PROCEDURE — 81001 URINALYSIS AUTO W/SCOPE: CPT | Performed by: STUDENT IN AN ORGANIZED HEALTH CARE EDUCATION/TRAINING PROGRAM

## 2024-09-10 PROCEDURE — 85018 HEMOGLOBIN: CPT | Performed by: OBSTETRICS & GYNECOLOGY

## 2024-09-10 PROCEDURE — 84112 EVAL AMNIOTIC FLUID PROTEIN: CPT | Performed by: OBSTETRICS & GYNECOLOGY

## 2024-09-10 PROCEDURE — 86592 SYPHILIS TEST NON-TREP QUAL: CPT | Performed by: OBSTETRICS & GYNECOLOGY

## 2024-09-10 PROCEDURE — 99221 1ST HOSP IP/OBS SF/LOW 40: CPT | Performed by: NURSE PRACTITIONER

## 2024-09-10 PROCEDURE — 36415 COLL VENOUS BLD VENIPUNCTURE: CPT | Performed by: OBSTETRICS & GYNECOLOGY

## 2024-09-10 PROCEDURE — 87210 SMEAR WET MOUNT SALINE/INK: CPT | Performed by: OBSTETRICS & GYNECOLOGY

## 2024-09-10 PROCEDURE — 96372 THER/PROPH/DIAG INJ SC/IM: CPT | Performed by: OBSTETRICS & GYNECOLOGY

## 2024-09-10 PROCEDURE — 120N000001 HC R&B MED SURG/OB

## 2024-09-10 PROCEDURE — 86593 SYPHILIS TEST NON-TREP QUANT: CPT | Performed by: OBSTETRICS & GYNECOLOGY

## 2024-09-10 PROCEDURE — 86780 TREPONEMA PALLIDUM: CPT | Performed by: OBSTETRICS & GYNECOLOGY

## 2024-09-10 PROCEDURE — 250N000011 HC RX IP 250 OP 636: Performed by: OBSTETRICS & GYNECOLOGY

## 2024-09-10 PROCEDURE — 86900 BLOOD TYPING SEROLOGIC ABO: CPT | Performed by: OBSTETRICS & GYNECOLOGY

## 2024-09-10 PROCEDURE — 250N000013 HC RX MED GY IP 250 OP 250 PS 637: Performed by: STUDENT IN AN ORGANIZED HEALTH CARE EDUCATION/TRAINING PROGRAM

## 2024-09-10 PROCEDURE — 250N000011 HC RX IP 250 OP 636: Performed by: STUDENT IN AN ORGANIZED HEALTH CARE EDUCATION/TRAINING PROGRAM

## 2024-09-10 RX ORDER — MAGNESIUM HYDROXIDE/ALUMINUM HYDROXICE/SIMETHICONE 120; 1200; 1200 MG/30ML; MG/30ML; MG/30ML
30 SUSPENSION ORAL
Status: DISCONTINUED | OUTPATIENT
Start: 2024-09-10 | End: 2024-09-14 | Stop reason: HOSPADM

## 2024-09-10 RX ORDER — NALOXONE HYDROCHLORIDE 0.4 MG/ML
0.2 INJECTION, SOLUTION INTRAMUSCULAR; INTRAVENOUS; SUBCUTANEOUS
Status: DISCONTINUED | OUTPATIENT
Start: 2024-09-10 | End: 2024-09-14

## 2024-09-10 RX ORDER — TRANEXAMIC ACID 10 MG/ML
1 INJECTION, SOLUTION INTRAVENOUS EVERY 30 MIN PRN
Status: DISCONTINUED | OUTPATIENT
Start: 2024-09-10 | End: 2024-09-14

## 2024-09-10 RX ORDER — PROCHLORPERAZINE 25 MG
25 SUPPOSITORY, RECTAL RECTAL EVERY 12 HOURS PRN
Status: DISCONTINUED | OUTPATIENT
Start: 2024-09-10 | End: 2024-09-14 | Stop reason: HOSPADM

## 2024-09-10 RX ORDER — CARBOPROST TROMETHAMINE 250 UG/ML
250 INJECTION, SOLUTION INTRAMUSCULAR
Status: DISCONTINUED | OUTPATIENT
Start: 2024-09-10 | End: 2024-09-14

## 2024-09-10 RX ORDER — AMOXICILLIN 250 MG/1
250 CAPSULE ORAL 3 TIMES DAILY
Status: DISCONTINUED | OUTPATIENT
Start: 2024-09-12 | End: 2024-09-14 | Stop reason: HOSPADM

## 2024-09-10 RX ORDER — PENICILLIN G POTASSIUM 5000000 [IU]/1
5 INJECTION, POWDER, FOR SOLUTION INTRAMUSCULAR; INTRAVENOUS ONCE
Status: COMPLETED | OUTPATIENT
Start: 2024-09-10 | End: 2024-09-10

## 2024-09-10 RX ORDER — ACETAMINOPHEN 325 MG/1
650 TABLET ORAL EVERY 4 HOURS PRN
Status: DISCONTINUED | OUTPATIENT
Start: 2024-09-10 | End: 2024-09-14 | Stop reason: HOSPADM

## 2024-09-10 RX ORDER — AZITHROMYCIN 250 MG/1
1000 TABLET, FILM COATED ORAL ONCE
Status: DISCONTINUED | OUTPATIENT
Start: 2024-09-10 | End: 2024-09-10 | Stop reason: HOSPADM

## 2024-09-10 RX ORDER — METOCLOPRAMIDE 10 MG/1
10 TABLET ORAL EVERY 6 HOURS PRN
Status: DISCONTINUED | OUTPATIENT
Start: 2024-09-10 | End: 2024-09-14 | Stop reason: HOSPADM

## 2024-09-10 RX ORDER — MISOPROSTOL 200 UG/1
800 TABLET ORAL
Status: DISCONTINUED | OUTPATIENT
Start: 2024-09-10 | End: 2024-09-14

## 2024-09-10 RX ORDER — OXYTOCIN/0.9 % SODIUM CHLORIDE 30/500 ML
100-340 PLASTIC BAG, INJECTION (ML) INTRAVENOUS CONTINUOUS PRN
Status: DISCONTINUED | OUTPATIENT
Start: 2024-09-10 | End: 2024-09-14

## 2024-09-10 RX ORDER — HYDROXYZINE HYDROCHLORIDE 50 MG/1
100 TABLET, FILM COATED ORAL
Status: DISCONTINUED | OUTPATIENT
Start: 2024-09-10 | End: 2024-09-14 | Stop reason: HOSPADM

## 2024-09-10 RX ORDER — IBUPROFEN 800 MG/1
800 TABLET, FILM COATED ORAL
Status: DISCONTINUED | OUTPATIENT
Start: 2024-09-10 | End: 2024-09-14

## 2024-09-10 RX ORDER — LIDOCAINE 40 MG/G
CREAM TOPICAL
Status: DISCONTINUED | OUTPATIENT
Start: 2024-09-10 | End: 2024-09-10 | Stop reason: HOSPADM

## 2024-09-10 RX ORDER — AMPICILLIN 2 G/1
2 INJECTION, POWDER, FOR SOLUTION INTRAVENOUS EVERY 6 HOURS
Status: COMPLETED | OUTPATIENT
Start: 2024-09-10 | End: 2024-09-12

## 2024-09-10 RX ORDER — ONDANSETRON 2 MG/ML
4 INJECTION INTRAMUSCULAR; INTRAVENOUS EVERY 6 HOURS PRN
Status: DISCONTINUED | OUTPATIENT
Start: 2024-09-10 | End: 2024-09-14 | Stop reason: HOSPADM

## 2024-09-10 RX ORDER — AZITHROMYCIN 250 MG/1
1000 TABLET, FILM COATED ORAL ONCE
Status: COMPLETED | OUTPATIENT
Start: 2024-09-10 | End: 2024-09-10

## 2024-09-10 RX ORDER — AMPICILLIN 2 G/1
2 INJECTION, POWDER, FOR SOLUTION INTRAVENOUS EVERY 6 HOURS
Status: DISCONTINUED | OUTPATIENT
Start: 2024-09-10 | End: 2024-09-10 | Stop reason: HOSPADM

## 2024-09-10 RX ORDER — PANTOPRAZOLE SODIUM 40 MG/1
40 TABLET, DELAYED RELEASE ORAL
Status: DISCONTINUED | OUTPATIENT
Start: 2024-09-10 | End: 2024-09-14 | Stop reason: HOSPADM

## 2024-09-10 RX ORDER — AMOXICILLIN 250 MG
2 CAPSULE ORAL 2 TIMES DAILY
Status: DISCONTINUED | OUTPATIENT
Start: 2024-09-10 | End: 2024-09-12

## 2024-09-10 RX ORDER — NICOTINE POLACRILEX 4 MG
15-30 LOZENGE BUCCAL
Status: DISCONTINUED | OUTPATIENT
Start: 2024-09-10 | End: 2024-09-14 | Stop reason: HOSPADM

## 2024-09-10 RX ORDER — NIFEDIPINE 10 MG/1
20 CAPSULE ORAL EVERY 6 HOURS
Status: DISCONTINUED | OUTPATIENT
Start: 2024-09-10 | End: 2024-09-10 | Stop reason: HOSPADM

## 2024-09-10 RX ORDER — NALOXONE HYDROCHLORIDE 0.4 MG/ML
0.4 INJECTION, SOLUTION INTRAMUSCULAR; INTRAVENOUS; SUBCUTANEOUS
Status: DISCONTINUED | OUTPATIENT
Start: 2024-09-10 | End: 2024-09-14

## 2024-09-10 RX ORDER — PROCHLORPERAZINE MALEATE 10 MG
10 TABLET ORAL EVERY 6 HOURS PRN
Status: DISCONTINUED | OUTPATIENT
Start: 2024-09-10 | End: 2024-09-14 | Stop reason: HOSPADM

## 2024-09-10 RX ORDER — BETAMETHASONE SODIUM PHOSPHATE AND BETAMETHASONE ACETATE 3; 3 MG/ML; MG/ML
12 INJECTION, SUSPENSION INTRA-ARTICULAR; INTRALESIONAL; INTRAMUSCULAR; SOFT TISSUE ONCE
Status: COMPLETED | OUTPATIENT
Start: 2024-09-10 | End: 2024-09-10

## 2024-09-10 RX ORDER — AMOXICILLIN 250 MG
1 CAPSULE ORAL 2 TIMES DAILY
Status: DISCONTINUED | OUTPATIENT
Start: 2024-09-10 | End: 2024-09-12

## 2024-09-10 RX ORDER — OXYTOCIN/0.9 % SODIUM CHLORIDE 30/500 ML
340 PLASTIC BAG, INJECTION (ML) INTRAVENOUS CONTINUOUS PRN
Status: DISCONTINUED | OUTPATIENT
Start: 2024-09-10 | End: 2024-09-14

## 2024-09-10 RX ORDER — MISOPROSTOL 200 UG/1
400 TABLET ORAL
Status: DISCONTINUED | OUTPATIENT
Start: 2024-09-10 | End: 2024-09-14

## 2024-09-10 RX ORDER — FENTANYL CITRATE 50 UG/ML
50 INJECTION, SOLUTION INTRAMUSCULAR; INTRAVENOUS EVERY 30 MIN PRN
Status: DISCONTINUED | OUTPATIENT
Start: 2024-09-10 | End: 2024-09-14

## 2024-09-10 RX ORDER — KETOROLAC TROMETHAMINE 30 MG/ML
30 INJECTION, SOLUTION INTRAMUSCULAR; INTRAVENOUS
Status: DISCONTINUED | OUTPATIENT
Start: 2024-09-10 | End: 2024-09-14

## 2024-09-10 RX ORDER — PROCHLORPERAZINE MALEATE 10 MG
10 TABLET ORAL EVERY 6 HOURS PRN
Status: DISCONTINUED | OUTPATIENT
Start: 2024-09-10 | End: 2024-09-14

## 2024-09-10 RX ORDER — PRENATAL VIT/IRON FUM/FOLIC AC 27MG-0.8MG
1 TABLET ORAL DAILY
Status: DISCONTINUED | OUTPATIENT
Start: 2024-09-10 | End: 2024-09-14 | Stop reason: HOSPADM

## 2024-09-10 RX ORDER — PROCHLORPERAZINE 25 MG
25 SUPPOSITORY, RECTAL RECTAL EVERY 12 HOURS PRN
Status: DISCONTINUED | OUTPATIENT
Start: 2024-09-10 | End: 2024-09-14

## 2024-09-10 RX ORDER — DIPHENHYDRAMINE HYDROCHLORIDE 50 MG/ML
25 INJECTION INTRAMUSCULAR; INTRAVENOUS EVERY 6 HOURS PRN
Status: DISCONTINUED | OUTPATIENT
Start: 2024-09-10 | End: 2024-09-14 | Stop reason: HOSPADM

## 2024-09-10 RX ORDER — DEXTROSE MONOHYDRATE 25 G/50ML
25-50 INJECTION, SOLUTION INTRAVENOUS
Status: DISCONTINUED | OUTPATIENT
Start: 2024-09-10 | End: 2024-09-14 | Stop reason: HOSPADM

## 2024-09-10 RX ORDER — CITRIC ACID/SODIUM CITRATE 334-500MG
30 SOLUTION, ORAL ORAL
Status: DISCONTINUED | OUTPATIENT
Start: 2024-09-10 | End: 2024-09-14

## 2024-09-10 RX ORDER — OXYTOCIN 10 [USP'U]/ML
10 INJECTION, SOLUTION INTRAMUSCULAR; INTRAVENOUS
Status: DISCONTINUED | OUTPATIENT
Start: 2024-09-10 | End: 2024-09-14

## 2024-09-10 RX ORDER — ACETAMINOPHEN 325 MG/1
650 TABLET ORAL EVERY 4 HOURS PRN
Status: DISCONTINUED | OUTPATIENT
Start: 2024-09-10 | End: 2024-09-14

## 2024-09-10 RX ORDER — HYDROXYZINE HYDROCHLORIDE 50 MG/1
50 TABLET, FILM COATED ORAL
Status: DISCONTINUED | OUTPATIENT
Start: 2024-09-10 | End: 2024-09-15 | Stop reason: HOSPADM

## 2024-09-10 RX ORDER — SIMETHICONE 80 MG
160 TABLET,CHEWABLE ORAL EVERY 4 HOURS PRN
Status: DISCONTINUED | OUTPATIENT
Start: 2024-09-10 | End: 2024-09-14 | Stop reason: HOSPADM

## 2024-09-10 RX ORDER — LOPERAMIDE HCL 2 MG
2 CAPSULE ORAL
Status: DISCONTINUED | OUTPATIENT
Start: 2024-09-10 | End: 2024-09-14

## 2024-09-10 RX ORDER — POLYETHYLENE GLYCOL 3350 17 G/17G
17 POWDER, FOR SOLUTION ORAL DAILY
Status: DISCONTINUED | OUTPATIENT
Start: 2024-09-10 | End: 2024-09-12

## 2024-09-10 RX ORDER — METOCLOPRAMIDE 10 MG/1
10 TABLET ORAL EVERY 6 HOURS PRN
Status: DISCONTINUED | OUTPATIENT
Start: 2024-09-10 | End: 2024-09-14

## 2024-09-10 RX ORDER — ONDANSETRON 2 MG/ML
4 INJECTION INTRAMUSCULAR; INTRAVENOUS EVERY 6 HOURS PRN
Status: DISCONTINUED | OUTPATIENT
Start: 2024-09-10 | End: 2024-09-14

## 2024-09-10 RX ORDER — PENICILLIN G 3000000 [IU]/50ML
3 INJECTION, SOLUTION INTRAVENOUS EVERY 4 HOURS
Status: DISCONTINUED | OUTPATIENT
Start: 2024-09-10 | End: 2024-09-10 | Stop reason: HOSPADM

## 2024-09-10 RX ORDER — ONDANSETRON 4 MG/1
4 TABLET, ORALLY DISINTEGRATING ORAL EVERY 6 HOURS PRN
Status: DISCONTINUED | OUTPATIENT
Start: 2024-09-10 | End: 2024-09-14 | Stop reason: HOSPADM

## 2024-09-10 RX ORDER — METOCLOPRAMIDE HYDROCHLORIDE 5 MG/ML
10 INJECTION INTRAMUSCULAR; INTRAVENOUS EVERY 6 HOURS PRN
Status: DISCONTINUED | OUTPATIENT
Start: 2024-09-10 | End: 2024-09-14 | Stop reason: HOSPADM

## 2024-09-10 RX ORDER — METOCLOPRAMIDE HYDROCHLORIDE 5 MG/ML
10 INJECTION INTRAMUSCULAR; INTRAVENOUS EVERY 6 HOURS PRN
Status: DISCONTINUED | OUTPATIENT
Start: 2024-09-10 | End: 2024-09-14

## 2024-09-10 RX ORDER — BETAMETHASONE SODIUM PHOSPHATE AND BETAMETHASONE ACETATE 3; 3 MG/ML; MG/ML
12 INJECTION, SUSPENSION INTRA-ARTICULAR; INTRALESIONAL; INTRAMUSCULAR; SOFT TISSUE ONCE
Status: DISCONTINUED | OUTPATIENT
Start: 2024-09-11 | End: 2024-09-10 | Stop reason: HOSPADM

## 2024-09-10 RX ORDER — ONDANSETRON 4 MG/1
4 TABLET, ORALLY DISINTEGRATING ORAL EVERY 6 HOURS PRN
Status: DISCONTINUED | OUTPATIENT
Start: 2024-09-10 | End: 2024-09-14

## 2024-09-10 RX ORDER — LOPERAMIDE HCL 2 MG
4 CAPSULE ORAL
Status: DISCONTINUED | OUTPATIENT
Start: 2024-09-10 | End: 2024-09-14

## 2024-09-10 RX ORDER — METHYLERGONOVINE MALEATE 0.2 MG/ML
200 INJECTION INTRAVENOUS
Status: DISCONTINUED | OUTPATIENT
Start: 2024-09-10 | End: 2024-09-14

## 2024-09-10 RX ORDER — AMOXICILLIN 250 MG/1
250 CAPSULE ORAL 3 TIMES DAILY
Status: DISCONTINUED | OUTPATIENT
Start: 2024-09-12 | End: 2024-09-10 | Stop reason: HOSPADM

## 2024-09-10 RX ORDER — BETAMETHASONE SODIUM PHOSPHATE AND BETAMETHASONE ACETATE 3; 3 MG/ML; MG/ML
12 INJECTION, SUSPENSION INTRA-ARTICULAR; INTRALESIONAL; INTRAMUSCULAR; SOFT TISSUE EVERY 24 HOURS
Status: COMPLETED | OUTPATIENT
Start: 2024-09-11 | End: 2024-09-11

## 2024-09-10 RX ORDER — DIPHENHYDRAMINE HCL 25 MG
25 CAPSULE ORAL EVERY 6 HOURS PRN
Status: DISCONTINUED | OUTPATIENT
Start: 2024-09-10 | End: 2024-09-14 | Stop reason: HOSPADM

## 2024-09-10 RX ADMIN — AMPICILLIN SODIUM 2 G: 2 INJECTION, POWDER, FOR SOLUTION INTRAMUSCULAR; INTRAVENOUS at 11:04

## 2024-09-10 RX ADMIN — SENNOSIDES AND DOCUSATE SODIUM 1 TABLET: 8.6; 5 TABLET ORAL at 09:55

## 2024-09-10 RX ADMIN — PRENATAL VIT W/ FE FUMARATE-FA TAB 27-0.8 MG 1 TABLET: 27-0.8 TAB at 09:50

## 2024-09-10 RX ADMIN — AZITHROMYCIN DIHYDRATE 1000 MG: 250 TABLET, FILM COATED ORAL at 10:18

## 2024-09-10 RX ADMIN — AMPICILLIN SODIUM 2 G: 2 INJECTION, POWDER, FOR SOLUTION INTRAMUSCULAR; INTRAVENOUS at 17:02

## 2024-09-10 RX ADMIN — AMPICILLIN SODIUM 2 G: 2 INJECTION, POWDER, FOR SOLUTION INTRAMUSCULAR; INTRAVENOUS at 22:47

## 2024-09-10 RX ADMIN — PENICILLIN G POTASSIUM 5 MILLION UNITS: 5000000 POWDER, FOR SOLUTION INTRAMUSCULAR; INTRAPLEURAL; INTRATHECAL; INTRAVENOUS at 05:10

## 2024-09-10 RX ADMIN — BETAMETHASONE SODIUM PHOSPHATE AND BETAMETHASONE ACETATE 12 MG: 3; 3 INJECTION, SUSPENSION INTRA-ARTICULAR; INTRALESIONAL; INTRAMUSCULAR at 04:22

## 2024-09-10 ASSESSMENT — ACTIVITIES OF DAILY LIVING (ADL)
ADLS_ACUITY_SCORE: 18
ADLS_ACUITY_SCORE: 35
ADLS_ACUITY_SCORE: 18

## 2024-09-10 NOTE — PROGRESS NOTES
Notified by RN today x 2 that post-prandial sugars have been elevated.  Previously GDM had been well-controlled on diet, so this may represent elevations due to betamethasone.    Placed orders to start checking sugars prior to meals and also added insulin correction.    MD Jamal

## 2024-09-10 NOTE — PROGRESS NOTES
H&P    HPI: Kayce Shannon is a 39 year old  at 33w3d who presents with PPROM overnight without onset of labor.     She states that she is feeling well today.  + FM, no ctx, VB.      Pregnancy notable for:  Hx of prior PPROM x2, most recent delivery at 28+5, prior at 36w  AMA  Prior  for breech, failed ECV, successful  following  GDMA1, POC BG 87 on presentation  GBS unknown, swab collected  Latent syphilis, previously treated, now RPR negative    OBHX:   OB History    Para Term  AB Living   4 2 2 0 0 2   SAB IAB Ectopic Multiple Live Births   0 0 0 0 2      # Outcome Date GA Lbr Jc/2nd Weight Sex Type Anes PTL Lv   4 Current            3 Term 20 38w2d  2.58 kg (5 lb 11 oz) F CS-LTranv Spinal N ELDON      Name: FARHAN,FEMALE-KAYCE      Apgar1: 8  Apgar5: 9   2 Term  38w0d  2.438 kg (5 lb 6 oz)  Vag-Spont   ELDON   1                 MedicalHX:   Past Medical History:   Diagnosis Date    Cervical high risk HPV (human papillomavirus) test positive 11/10/2021    6/7/13 NIL 10/20/17 NIL pap, neg HPV 11/10/21 LSIL, +HR HPV (not 16/18). Plan: cotest in 1 year / await provider    Immune thrombocytopenic purpura (H)     Created by Conversion     Kidney stone     Varicose vein of leg        SurgicalHX:   Past Surgical History:   Procedure Laterality Date     SECTION N/A 2020    Procedure: PRIMARY  SECTION;  Surgeon: Brenda Whitney MD;  Location: [facility];  Service: Obstetrics    NO PAST SURGERIES         Medications:   Current Facility-Administered Medications   Medication Dose Route Frequency Provider Last Rate Last Admin    lidocaine (LMX4) cream   Topical Q1H PRN Pawel Duarte MD        lidocaine 1 % 0.1-1 mL  0.1-1 mL Other Q1H PRN Pawel Duarte MD        sodium chloride (PF) 0.9% PF flush 3 mL  3 mL Intracatheter Q8H Pawel Duarte MD        sodium chloride (PF) 0.9% PF flush 3 mL  3 mL Intracatheter q1 min prn  "Pawel Duarte MD           Allergies:   No Known Allergies    FamilyHX:    Family History   Problem Relation Age of Onset    No Known Problems Daughter     Urolithiasis No family hx of     Gout No family hx of     Clotting Disorder No family hx of     Diabetes No family hx of     Cancer No family hx of     Heart Disease No family hx of        SocialHX:   Social History     Socioeconomic History    Marital status:    Tobacco Use    Smoking status: Never    Smokeless tobacco: Never   Substance and Sexual Activity    Alcohol use: No    Drug use: Never    Sexual activity: Not Currently     Birth control/protection: None     Social Determinants of Health      [facility]    Social Connections       ROS: 10-point ROS negative except as in HPI     Physical Exam:  Vitals:    09/10/24 0327   BP: 122/77   BP Location: Right arm   Patient Position: Semi-Delgado's   Cuff Size: Adult Regular   Pulse: 71   Resp: 16   Temp: 97.7  F (36.5  C)   TempSrc: Oral   Weight: 65.8 kg (145 lb)   Height: 1.562 m (5' 1.5\")     GEN: resting comfortably in bed, NAD   CV: RRR, no murmurs  PULM: CTAB, no increased work of breathing, no cough/wheeze   ABD: soft, gravid, non-tender, non-distended  CVX: 2/50, posterior  Presentation: vertex by BSUS    NST:  FHT: baseline 140, moderate variability, accels, no decels  TOCO: quiet    A/P: 39 year old  at 33w3d who presents with PPROM overnight without onset of labor. Cervix 2cm, not anastacio, vertex on BSUS. Confirms desires TOLAC.     BMZ x1 at 0430  GDMA1, POC BG 87 on presentation, concern for elevation with steroid injection  GBS unknown, swab collected  Latent syphilis, previously treated, now RPR negative  FHT: Category 1  Patient will be transported to [facility] [place] [place] gestational age    Pawel Duarte MD  9/10/2024 4:34 AM           "

## 2024-09-10 NOTE — PROGRESS NOTES
Data: [facility]: 9/10/2024  3:11 AM.  Reason for maternal/fetal assessment is leaking vaginal fluid. Patient reports feeling a gush of fluid around 0200, the fluid has continued to trickle since then. Patient denies vaginal bleeding, pelvic pressure, headache, visual disturbances, epigastric or RUQ pain, significant edema. Patient reports fetal movement is normal. Patient is a 33w3d . Prenatal record reviewed. Pregnancy has been complicated by diabetes. Support person is present.     Fetal HR baseline was 145 , variability is moderate . Accelerations: present . Decelerations: absent . Uterine assessment is  mild during contractions and soft  at rest. Cervix 2 cm dilated and 50% effaced. Fetal station -3. Fetal presentation  vertex per  bedside ultrasound . Membranes: fluid visually apparent externally. ROM+ collected and positive.     Vital signs wnl. Patient reports no pain and is coping.     Action: Verbal consent for EFM. Triage assessment completed. Patient does not meet criteria for early labor discharge.     Response: Patient verbalized agreement with plan. Will contact Dr. Duarte with update and for further orders.         Pt endorses clear fluid leaking since 0200, pt's pad soaked. ROM+ collected and positive. Dr. Duarte updated. Plan for transfer to [facility] d/t gestation of 33.3    Bedside ultrasound confirmed vertex by MD. Pt with GDM- diet controlled, BG 87. Betamethasone x1 given. GBS swab collected.

## 2024-09-10 NOTE — CONSULTS
[facility]                Neonatology Antepartum Counseling Consult:  I was asked to provide antepartum counseling for Kelly Shannon at the request of Lia Apple* secondary to PPROM. Ms. Kelly Shannon is currently 33 weeks/ 3 days gestation and has a history significant for:  Patient Active Problem List   Diagnosis    Idiopathic Thrombocytopenic Purpura     delivery delivered    Breech birth    Late latent syphilis    Poor fetal growth affecting management of mother in third trimester    Cervical high risk HPV (human papillomavirus) test positive    Encounter for triage in pregnant patient     premature rupture of membranes (PPROM) with unknown onset of labor      Betamethasone was administered on 9/10.   Ms. Kelly Shannon, was counseled on the expected hospital course, potential risks, and outcomes associated with an infant born at this gestation. The counseling included: initial delivery room stabilization, respiratory course, hemodynamic support, infection, nutrition, growth and development, and long term outcomes.  I also explained the basic four criteria for discharge: that the baby had to be free of apnea; able to maintain their body temperature; able to feed by bottle or breast well enough to; attain an adequate pattern of weight gain and growth.  The patient had no remaining questions but was encouraged to contact the NICU via their caregivers should any arise.  Please feel free to call and thank you for involving the NICU team in the care of your patient.      Consented to Vitamin K, Erythromycin, Hepatitis B vaccine and donor breast milk after birth.    Floor Time (min): 10  Face to Face Time (min): 20  Total Time (minutes): 30  More than 50% of my time was spent in direct, face to face, antepartum counseling with the above patient.    ROMAN Munoz, NNP-BC 9/10/2024 10:50 AM

## 2024-09-10 NOTE — PLAN OF CARE
Patient transferred by ambulance from [facility] due to PPROM at 33w3d. US/TOCO applied. Dr. Apple notified of patient arrival. RN awaiting further orders.

## 2024-09-10 NOTE — PROGRESS NOTES
Pt transferred via EMS to [facility] at 0542    Penicillin infusing at 100 ml/hr. Pt denies pain at this time. Pt's  to meet her at the hospital .

## 2024-09-10 NOTE — PROGRESS NOTES
Late entry 1045 Pt up to BR. Pt noted to have blood in urine as well  as the toilet seat. Pt has pink bloody discharge on pad.  Dr Goodwin notified, plan is to check cervix. Pt aware of plan of care.    1100 SVE as charted, no change from previous SVE . No blood noted on glove after checking pt. Dr Goodwin notified of check and no presence of blood on glove. Plan is to get a urine . Pt aware of plan of care.    1205 Pt straight catheterize . Pt tolerated well. Pt up to BR no fresh blood noted. Pt continues to leak fluid.  1300 Pt up to BR. Pt  noted to have blood in urine. Will continue to monitor.

## 2024-09-10 NOTE — PROGRESS NOTES
To room for BSUS to confirm fetal position.    BSUS confirms cephalic presentation.    Patient admitted for PPROM.  H/o , desires TOLAC.  She has a history of  delivery at 28 weeks, she had a term  in  and a term  in  for breech.     Discussed with cephalic presentation, if labor progresses would continue for vaginal delivery, if undelivered at 34 weeks then would recommend induction.    MD Jamal

## 2024-09-10 NOTE — PLAN OF CARE
Goal Outcome Evaluation:      Plan of Care Reviewed With: patient  Pt will maintain temps within normal limits. Will monitor for signs and symptoms of infection as well as monitor temps

## 2024-09-10 NOTE — PROGRESS NOTES
Dr Goodwin notified  of PCX of 175. MD will write orders for  sliding scale. Will  check PCX prior to eating. Pt aware of plan of care

## 2024-09-10 NOTE — H&P
[facility] Labor and Delivery History and Physical    Kayce Shannon MRN# [id number]   Age: 39 year old YOB: 1985     Date of Admission:  9/10/2024    Primary care provider: [facility]           Chief Complaint:   Kayce Shannon is a 39 year old female who is 33w3d pregnant and being admitted for PPROM, not in active labor.  Notes loss of fluid around 2 AM, was confirmed at [facility] and transferred here due to gestational age.  Transfer accepted from Dr. Duarte.  Pregnancy complicated by history of syphillis, A1GDM and prior  as well as history of  birth.          Pregnancy history:     OBSTETRIC HISTORY:    OB History    Para Term  AB Living   4 2 2 0 0 2   SAB IAB Ectopic Multiple Live Births   0 0 0 0 2      # Outcome Date GA Lbr Jc/2nd Weight Sex Type Anes PTL Lv   4 Current            3 Term 20 38w2d  2.58 kg (5 lb 11 oz) F CS-LTranv Spinal N ELDON      Name: FARHAN,FEMALE-KAYCE      Apgar1: 8  Apgar5: 9   2 Term  38w0d  2.438 kg (5 lb 6 oz)  Vag-Spont   ELDON   1                 EDC: Estimated Date of Delivery: 10/26/24    Prenatal Labs:   Lab Results   Component Value Date    AS Negative 2024    HEPBANG Nonreactive 2024    CHPCRT Negative 11/10/2021    GCPCRT Negative 11/10/2021    HGB 13.1 2024       GBS Status: pending, collected prior to transfer    Active Problem List  Patient Active Problem List   Diagnosis    Idiopathic Thrombocytopenic Purpura     delivery delivered    Breech birth    Late latent syphilis    Poor fetal growth affecting management of mother in third trimester    Cervical high risk HPV (human papillomavirus) test positive    Encounter for triage in pregnant patient     premature rupture of membranes (PPROM) with unknown onset of labor       Medication Prior to Admission  Medications Prior to Admission   Medication Sig Dispense Refill Last Dose    diclofenac (VOLTAREN)  1 % topical gel Apply 2 grams to left hip up to 4 times daily not to exceed 8 grams per day. (Patient not taking: Reported on 2023) 100 g 1     nitroFURantoin macrocrystal-monohydrate (MACROBID) 100 MG capsule  (Patient not taking: Reported on 10/4/2023)       ondansetron (ZOFRAN ODT) 4 MG ODT tab Take 1 tablet (4 mg) by mouth every 8 hours as needed for nausea 20 tablet 0     Prenatal Vit-Fe Fumarate-FA (PRENATAL MULTIVITAMIN  PLUS IRON) 27-1 MG TABS Take by mouth daily      .        Maternal Past Medical History:     Past Medical History:   Diagnosis Date    Cervical high risk HPV (human papillomavirus) test positive 11/10/2021    6/7/13 NIL 10/20/17 NIL pap, neg HPV 11/10/21 LSIL, +HR HPV (not 16/18). Plan: cotest in 1 year / await provider    Immune thrombocytopenic purpura (H)     Created by Conversion     Kidney stone     Varicose vein of leg                    PSH  for breech         Physical Exam:   Vitals were reviewed  Alert, Ox3  CV regular  Resp non labored  Ab gravid     Cervix:   Membranes: PPROM   Dilation: 2   Effacement: 50%   Station:-2   Consistency: soft  Presentation:Cephalic per Dr. Duarte  Fetal Heart Rate Tracing: reactive and reassuring  Tocometer: external monitor                       Assessment:   Kelly Shannon is a 33w3d pregnant female admitted with PPROM, GBS collected and pending, history of , c/s for breech and  desires TOLAC.  History of syphillis, s/p treatment with PCN.  GDM A1, full code.          Plan:   Admit - see IP orders, NICU consult, regular diet, early antepartum order set for glucose monitoring for now, switch to intrapartum order set in active labor. Discussed risk of infection, discussed risk of prior  and concern for uterine rupture, rev indications for  and they are accepted if indicated.All questions answered.  RPR obtained.  Handoff given to Dr. Goodwin. PPROM latency antibiotics ordered for now, if active labor switch back to  PCN.    Lia Apple MD

## 2024-09-11 LAB
GLUCOSE BLDC GLUCOMTR-MCNC: 125 MG/DL (ref 70–99)
GLUCOSE BLDC GLUCOMTR-MCNC: 134 MG/DL (ref 70–99)
GLUCOSE BLDC GLUCOMTR-MCNC: 159 MG/DL (ref 70–99)
GLUCOSE BLDC GLUCOMTR-MCNC: 176 MG/DL (ref 70–99)
GLUCOSE BLDC GLUCOMTR-MCNC: 97 MG/DL (ref 70–99)
GP B STREP DNA SPEC QL NAA+PROBE: POSITIVE
RPR SER QL: REACTIVE
RPR SER-TITR: NORMAL {TITER}

## 2024-09-11 PROCEDURE — 250N000011 HC RX IP 250 OP 636: Performed by: OBSTETRICS & GYNECOLOGY

## 2024-09-11 PROCEDURE — 250N000013 HC RX MED GY IP 250 OP 250 PS 637: Performed by: STUDENT IN AN ORGANIZED HEALTH CARE EDUCATION/TRAINING PROGRAM

## 2024-09-11 PROCEDURE — 250N000011 HC RX IP 250 OP 636: Performed by: STUDENT IN AN ORGANIZED HEALTH CARE EDUCATION/TRAINING PROGRAM

## 2024-09-11 PROCEDURE — 120N000001 HC R&B MED SURG/OB

## 2024-09-11 PROCEDURE — 250N000009 HC RX 250: Performed by: OBSTETRICS & GYNECOLOGY

## 2024-09-11 PROCEDURE — 258N000003 HC RX IP 258 OP 636: Performed by: OBSTETRICS & GYNECOLOGY

## 2024-09-11 RX ORDER — LIDOCAINE 40 MG/G
CREAM TOPICAL
Status: COMPLETED | OUTPATIENT
Start: 2024-09-11 | End: 2024-09-11

## 2024-09-11 RX ADMIN — AMPICILLIN SODIUM 2 G: 2 INJECTION, POWDER, FOR SOLUTION INTRAMUSCULAR; INTRAVENOUS at 23:07

## 2024-09-11 RX ADMIN — AMPICILLIN SODIUM 2 G: 2 INJECTION, POWDER, FOR SOLUTION INTRAMUSCULAR; INTRAVENOUS at 17:44

## 2024-09-11 RX ADMIN — BETAMETHASONE SODIUM PHOSPHATE AND BETAMETHASONE ACETATE 12 MG: 3; 3 INJECTION, SUSPENSION INTRA-ARTICULAR; INTRALESIONAL; INTRAMUSCULAR at 04:44

## 2024-09-11 RX ADMIN — SODIUM CHLORIDE, POTASSIUM CHLORIDE, SODIUM LACTATE AND CALCIUM CHLORIDE 1000 ML: 600; 310; 30; 20 INJECTION, SOLUTION INTRAVENOUS at 04:41

## 2024-09-11 RX ADMIN — AMPICILLIN SODIUM 2 G: 2 INJECTION, POWDER, FOR SOLUTION INTRAMUSCULAR; INTRAVENOUS at 04:41

## 2024-09-11 RX ADMIN — PENICILLIN G BENZATHINE 2.4 MILLION UNITS: 1200000 INJECTION, SUSPENSION INTRAMUSCULAR at 21:12

## 2024-09-11 RX ADMIN — LIDOCAINE: 40 CREAM TOPICAL at 21:11

## 2024-09-11 RX ADMIN — PANTOPRAZOLE SODIUM 40 MG: 40 TABLET, DELAYED RELEASE ORAL at 08:36

## 2024-09-11 RX ADMIN — PRENATAL VIT W/ FE FUMARATE-FA TAB 27-0.8 MG 1 TABLET: 27-0.8 TAB at 09:23

## 2024-09-11 RX ADMIN — AMPICILLIN SODIUM 2 G: 2 INJECTION, POWDER, FOR SOLUTION INTRAMUSCULAR; INTRAVENOUS at 11:20

## 2024-09-11 ASSESSMENT — ACTIVITIES OF DAILY LIVING (ADL)
ADLS_ACUITY_SCORE: 18

## 2024-09-11 NOTE — PROGRESS NOTES
Starting at 1648 FHR not always tracing continuously due to maternal movement and positioning, monitor adjusted multiple times.   Around 1730 patient sitting straight up to eat and FHR difficult to trace while patient eating.

## 2024-09-11 NOTE — PLAN OF CARE
Goal Outcome Evaluation:      Plan of Care Reviewed With: patient    Overall Patient Progress: improvingOverall Patient Progress: improving    Outcome Evaluation: Patient denies pain. Patient will call out if contractions increase in intensity, frequency or if bleeding occurs. RN christina continue to assess for signs of labor.

## 2024-09-11 NOTE — PLAN OF CARE
Goal Outcome Evaluation:         Care Plan reviewed with: Patient    Patient reported feeling a couple contractions around 1700 that she rated at a 2/10. At 1800 she denies feeling any further contractions. She also denies vaginal bleeding or leaking.

## 2024-09-11 NOTE — PROGRESS NOTES
Dr Downs notified of post prandial blood sugar. Plan is if next post prandial  blood sugar is greater than 140. Please call Dr Downs.

## 2024-09-11 NOTE — PROGRESS NOTES
Pt's . Pt has ordered 0.5 units of novolog  insulin. Unable to dose because novolog pen can't dose 0.5 units. Pharmacy called, notified that 0.5 units of novolog unavailable. Pharmacy suggested that provider need to change protocol because that is not available. Dr Slade sebastian returned page, informed that dose is not available.  Plan is to hold 1630 dose of insulin. Pt aware of plan of care. Report given to  MATTHEW ROMERO

## 2024-09-11 NOTE — PLAN OF CARE
Goal Outcome Evaluation:      Plan of Care Reviewed With: patient    Overall Patient Progress: no changeOverall Patient Progress: no change    Outcome Evaluation: Patient reports resting well overnight. An occasional contraction noted on the TOCO but patient denies feeling them. Patient will call out if contractions increase in frequency, intensity or if bleeding occurs. RN will continue to assess.

## 2024-09-11 NOTE — PROVIDER NOTIFICATION
Dr. Mckay updated on patient's reactive RPR and reactive treponema antibodies. Also, patient's post-prandial blood glucose is elevated at 159, will continue with current plan of care.

## 2024-09-11 NOTE — PROGRESS NOTES
Pt resting comfortably. Pt denies feeling any contractions/cramping. Pt states she is not leaking. Will continue to monitor.

## 2024-09-11 NOTE — PROGRESS NOTES
progress note    ASSESSMENT: PLAN:     the patient is a  who is at 33+4 gestational age.    Admitted with PPROM  On latency antibiotics   Second betamethasone today   Gestational diabetes- started on insulin yesterday- sugars may be highg due to bet.       Subjective:  Worried about kids at home.  Doesn't understand why she has to be here since no more leaking-     Objective:    Vitals:    09/10/24 2033 09/10/24 2312 24 0440 24 0441   BP: 106/52 115/61 110/60    BP Location: Left arm Left arm Left arm    Patient Position: Right side Semi-Delgado's Sitting    Cuff Size: Adult Small Adult Small Adult Small    Pulse: 75 64 70    Resp: 16 16 18    Temp: 98.2  F (36.8  C) 97.9  F (36.6  C) 97.9  F (36.6  C)    TempSrc: Oral Oral Oral    SpO2:   99% 99%   Weight:         WBC Count   Date Value Ref Range Status   2024 10.4 4.0 - 11.0 10e3/uL Final       FHTS reactive   CONTRACTIONS:  rare   CERVIX: not checked  FLUID:  Marta June MD

## 2024-09-11 NOTE — PROGRESS NOTES
"0720 Pt's fasting PCX was 125. 0.5 insulin, Dr Downs called . Asked for clarification of orders. Insulin orders received.Write  went to give insulin Pt states \" I am going to wait to eat my breakfast until after I shower. Plan is to recheck Pt's blood sugar when she is  ready to eat breakfast. Then dose insulin based on that blood sugar. Pt aware of plan of care.  "

## 2024-09-11 NOTE — PLAN OF CARE
Goal Outcome Evaluation:      Plan of Care Reviewed With: patient Pt will maintain pregnancy. Will continue with monitoring for contractions that lead to cervical change.Pt will stay hydrated.

## 2024-09-12 ENCOUNTER — APPOINTMENT (OUTPATIENT)
Dept: ULTRASOUND IMAGING | Facility: HOSPITAL | Age: 39
End: 2024-09-12
Attending: OBSTETRICS & GYNECOLOGY
Payer: COMMERCIAL

## 2024-09-12 LAB
GLUCOSE BLDC GLUCOMTR-MCNC: 104 MG/DL (ref 70–99)
GLUCOSE BLDC GLUCOMTR-MCNC: 127 MG/DL (ref 70–99)
GLUCOSE BLDC GLUCOMTR-MCNC: 156 MG/DL (ref 70–99)
GLUCOSE BLDC GLUCOMTR-MCNC: 92 MG/DL (ref 70–99)
GLUCOSE BLDC GLUCOMTR-MCNC: 96 MG/DL (ref 70–99)

## 2024-09-12 PROCEDURE — 250N000011 HC RX IP 250 OP 636: Performed by: STUDENT IN AN ORGANIZED HEALTH CARE EDUCATION/TRAINING PROGRAM

## 2024-09-12 PROCEDURE — 120N000001 HC R&B MED SURG/OB

## 2024-09-12 PROCEDURE — 250N000013 HC RX MED GY IP 250 OP 250 PS 637: Performed by: STUDENT IN AN ORGANIZED HEALTH CARE EDUCATION/TRAINING PROGRAM

## 2024-09-12 PROCEDURE — 76819 FETAL BIOPHYS PROFIL W/O NST: CPT

## 2024-09-12 RX ORDER — AMOXICILLIN 250 MG
1 CAPSULE ORAL 2 TIMES DAILY PRN
Status: DISCONTINUED | OUTPATIENT
Start: 2024-09-12 | End: 2024-09-14 | Stop reason: HOSPADM

## 2024-09-12 RX ORDER — AMOXICILLIN 250 MG
2 CAPSULE ORAL 2 TIMES DAILY PRN
Status: DISCONTINUED | OUTPATIENT
Start: 2024-09-12 | End: 2024-09-14 | Stop reason: HOSPADM

## 2024-09-12 RX ORDER — POLYETHYLENE GLYCOL 3350 17 G/17G
17 POWDER, FOR SOLUTION ORAL DAILY PRN
Status: DISCONTINUED | OUTPATIENT
Start: 2024-09-12 | End: 2024-09-14 | Stop reason: HOSPADM

## 2024-09-12 RX ADMIN — AMOXICILLIN 250 MG: 250 CAPSULE ORAL at 08:56

## 2024-09-12 RX ADMIN — AMOXICILLIN 250 MG: 250 CAPSULE ORAL at 21:03

## 2024-09-12 RX ADMIN — AMPICILLIN SODIUM 2 G: 2 INJECTION, POWDER, FOR SOLUTION INTRAMUSCULAR; INTRAVENOUS at 04:38

## 2024-09-12 RX ADMIN — PANTOPRAZOLE SODIUM 40 MG: 40 TABLET, DELAYED RELEASE ORAL at 07:57

## 2024-09-12 RX ADMIN — AMOXICILLIN 250 MG: 250 CAPSULE ORAL at 13:54

## 2024-09-12 RX ADMIN — PRENATAL VIT W/ FE FUMARATE-FA TAB 27-0.8 MG 1 TABLET: 27-0.8 TAB at 08:56

## 2024-09-12 ASSESSMENT — ACTIVITIES OF DAILY LIVING (ADL)
ADLS_ACUITY_SCORE: 18

## 2024-09-12 NOTE — PROCEDURES
NST Procedure note    Date: 9/12/2024    Indication: PPROM    Procedure: Fetal non-stress test    Results: Reactive    Lia Apple MD  9/12/2024 9:07 AM

## 2024-09-12 NOTE — PROGRESS NOTES
Syphilis antibody still positive and RPR positive with 1:1 titer. She was treated last pregnancy and had a negative RPR in 2023.   Spoke with ID who agreed that retreating would not be harmful and possibly beneficial due to pregnant status.   Reviewed this recommendation with patient and  and she agrees with treating.

## 2024-09-12 NOTE — PLAN OF CARE
"  Problem: Adult Inpatient Plan of Care  Goal: Patient-Specific Goal (Individualized)  Description: You can add care plan individualizations to a care plan. Examples of Individualization might be:  \"Parent requests to be called daily at 9am for status\", \"I have a hard time hearing out of my right ear\", or \"Do not touch me to wake me up as it startles  me\".  Recent Flowsheet Documentation  Taken 2024 by Ashleigh Espinal RN  Individualized Care Needs:   PPROM   prior    A1GDM  Patient/Family-Specific Goals (Include Timeframe): maintain pregnancy today     Problem: Adult Inpatient Plan of Care  Goal: Absence of Hospital-Acquired Illness or Injury  Intervention: Prevent Infection  Recent Flowsheet Documentation  Taken 2024 by Ashleigh Espinal RN  Infection Prevention:   environmental surveillance performed   equipment surfaces disinfected   hand hygiene promoted   personal protective equipment utilized   single patient room provided   Goal Outcome Evaluation:      Plan of Care Reviewed With: patient    Overall Patient Progress: no changeOverall Patient Progress: no change    Outcome Evaluation: Stable PPROM status with reactive NST, BPP 8/8; stable vital signs, blood sugars as ordered with SS insulin AC as needed   Vital signs remained stable and without signs/symptoms of infection. Leaking scant amounts of clear fluid with no odor.    Problem: Labor  Goal: Stable Fetal Wellbeing  Intervention: Promote and Monitor Fetal Wellbeing  Recent Flowsheet Documentation  Taken 2024 by Ashleigh Espinal RN  Body Position: position changed independently  Fetal Wellbeing Promotion: intake and output monitored   Obtained reactive NST tracing at 1224; BPP 8/8 with SDP 3.4 cm and vertex presentation.    Problem: Diabetes in Pregnancy  Goal: Blood Glucose Level Within Targeted Range  Outcome: Progressing  Intervention: Monitor and Manage Glycemia  Recent Flowsheet " Documentation  Taken 9/12/2024 0752 by Ashleigh Espinal, RN  Fetal Wellbeing Promotion: intake and output monitored   Stable FBS of 104 prior to breakfast. Ate 47g CHO with breakfast; 1 hour PC blood glucose was 159. IH[facility]-Dr Kowalski informed of PC blood glucose since it was >140 goal as identified in orders.

## 2024-09-12 NOTE — PROGRESS NOTES
AM fasting blood sugar deferred until breakfast tray has arrived in room. Hlee has now ordered breakfast prior to getting in shower. Plan for NST following eating breakfast. Continues to deny any uterine activity and reports normal fetal movement.

## 2024-09-12 NOTE — PROGRESS NOTES
Monitors removed to go for scheduled BPP. Transported via wheelchair and brought to ultrasound. Will resume NST for this shift and check PC blood glucose once Hlee has returned to unit.

## 2024-09-12 NOTE — PROGRESS NOTES
IH[facility]-Dr Kowalski informed of am blood sugars with elevated PC of 156 after late breakfast. Updates given regarding reactive NST and BPP 8/8 with SDP 3.4cm and vertex presentation.

## 2024-09-12 NOTE — PROGRESS NOTES
[facility] Labor and Delivery Progress Note    Kelly Shannon MRN# [id number]   Age: 39 year old YOB: 1985           Subjective:   Patient feels well.           Objective:   Patient Vitals for the past 24 hrs:   BP Temp Temp src Resp SpO2 Weight Oximeter Heart Rate   2413 -- -- -- -- -- 64.7 kg (142 lb 11.2 oz) --   24 0752 99/51 -- -- -- -- -- 63 bpm   24 0751 -- 98.1  F (36.7  C) Oral 15 97 % -- --   24 0440 107/57 98.1  F (36.7  C) Oral 16 98 % -- 64 bpm   24 2120 104/51 98.1  F (36.7  C) Oral 18 98 % -- 71 bpm   24 1600 107/58 98  F (36.7  C) Oral 16 -- -- 61 bpm   24 1320 96/50 98.4  F (36.9  C) Oral 18 -- -- 63 bpm   24 0952 103/50 98.2  F (36.8  C) Oral 18 -- -- 73 bpm         Cervical Exam: 2 / 50% / -3        Membranes: Leaking   Ab gravid, soft        Assessment:   Kelly Shannon is a 39 year old  who is 33w5d here with PPROM, and positive syphilis, diet controlled GDM          Plan:   In regards to PPROM, on latency antibiotics, plan to induce at 34 weeks, will change to GBS prophylaxis at that time, if labors prior or any signs of tripl I would not stop  GDM but s/p beta so covering elevated glucose values with sliding scale, may need insulin drip in active labor, will change to actove labor protocol when this applies  Syphilis- s/p IM PCN, discussed with ID    Lia Apple MD FACOG  [facility]  [phone]        Lia Apple MD

## 2024-09-12 NOTE — PLAN OF CARE
Goal Outcome Evaluation:      Plan of Care Reviewed With: patient                 Patient got her IM penicillin injections, tolerated well. EFM  Cat 1. OK for NST Q shift. VSS

## 2024-09-12 NOTE — PLAN OF CARE
Goal Outcome Evaluation:      Plan of Care Reviewed With: patient    Overall Patient Progress: improvingOverall Patient Progress: improving           Patient denies any contractions or cramping. VSS afebrile. NSTs reactive. Patient able to sleep during the night.

## 2024-09-12 NOTE — PLAN OF CARE
Goal Outcome Evaluation:      Plan of Care Reviewed With: patient    Overall Patient Progress: improvingOverall Patient Progress: improving    Outcome Evaluation: VSS. Patient reports scant vaginal leaking, no bleeding. Denies contractions. Reports +FM. BG checks per orders.  at bedside.

## 2024-09-12 NOTE — PROGRESS NOTES
Report received and care assumed. Orders and prenatal record reviewed. Kelly is awake and reports sleeping better than the other nights due to the monitors being off for longer intervals. She declines use of  this morning but has requested one when Dr. Apple rounds later this morning. She denies any discomfort, uterine activity, fever, chills, body aches, headache, shortness of breath, chest or calf pain. She admits to leaking small amounts of clear fluid and will leave the next pad out after changing it for this writer to see. She denies any vaginal discharge, bleeding or odor to the fluid. She reports normal fetal activity. Plan for NST later this shift since last fetal monitoring was done around 0430. Vital signs stable and AM weight obtained. She reports a good appetite but states she has lost weight in the hospital because her diet has been more restricted than what she was eating at home. Plans to eat around 0900; agreeable with FBS at that time and then blood glucose as ordered. IV access is saline locked and Kelly is on PO latency antibiotics for PPROM.

## 2024-09-13 ENCOUNTER — VIRTUAL VISIT (OUTPATIENT)
Dept: INTERPRETER SERVICES | Facility: CLINIC | Age: 39
End: 2024-09-13

## 2024-09-13 LAB
ABO/RH(D): NORMAL
ANTIBODY SCREEN: NEGATIVE
ERYTHROCYTE [DISTWIDTH] IN BLOOD BY AUTOMATED COUNT: 12.9 % (ref 10–15)
GLUCOSE BLDC GLUCOMTR-MCNC: 116 MG/DL (ref 70–99)
GLUCOSE BLDC GLUCOMTR-MCNC: 134 MG/DL (ref 70–99)
GLUCOSE BLDC GLUCOMTR-MCNC: 81 MG/DL (ref 70–99)
GLUCOSE BLDC GLUCOMTR-MCNC: 89 MG/DL (ref 70–99)
HCT VFR BLD AUTO: 30.1 % (ref 35–47)
HGB BLD-MCNC: 10.6 G/DL (ref 11.7–15.7)
MCH RBC QN AUTO: 31.9 PG (ref 26.5–33)
MCHC RBC AUTO-ENTMCNC: 35.2 G/DL (ref 31.5–36.5)
MCV RBC AUTO: 91 FL (ref 78–100)
PLATELET # BLD AUTO: 180 10E3/UL (ref 150–450)
RBC # BLD AUTO: 3.32 10E6/UL (ref 3.8–5.2)
SPECIMEN EXPIRATION DATE: NORMAL
WBC # BLD AUTO: 9.4 10E3/UL (ref 4–11)

## 2024-09-13 PROCEDURE — 86900 BLOOD TYPING SEROLOGIC ABO: CPT | Performed by: OBSTETRICS & GYNECOLOGY

## 2024-09-13 PROCEDURE — 250N000013 HC RX MED GY IP 250 OP 250 PS 637: Performed by: STUDENT IN AN ORGANIZED HEALTH CARE EDUCATION/TRAINING PROGRAM

## 2024-09-13 PROCEDURE — 120N000001 HC R&B MED SURG/OB

## 2024-09-13 PROCEDURE — 85027 COMPLETE CBC AUTOMATED: CPT | Performed by: OBSTETRICS & GYNECOLOGY

## 2024-09-13 PROCEDURE — 36415 COLL VENOUS BLD VENIPUNCTURE: CPT | Performed by: OBSTETRICS & GYNECOLOGY

## 2024-09-13 PROCEDURE — T1013 SIGN LANG/ORAL INTERPRETER: HCPCS | Mod: U4,TEL,95

## 2024-09-13 RX ADMIN — PRENATAL VIT W/ FE FUMARATE-FA TAB 27-0.8 MG 1 TABLET: 27-0.8 TAB at 09:27

## 2024-09-13 RX ADMIN — AMOXICILLIN 250 MG: 250 CAPSULE ORAL at 20:50

## 2024-09-13 RX ADMIN — PANTOPRAZOLE SODIUM 40 MG: 40 TABLET, DELAYED RELEASE ORAL at 06:53

## 2024-09-13 RX ADMIN — AMOXICILLIN 250 MG: 250 CAPSULE ORAL at 14:14

## 2024-09-13 RX ADMIN — AMOXICILLIN 250 MG: 250 CAPSULE ORAL at 09:27

## 2024-09-13 ASSESSMENT — ACTIVITIES OF DAILY LIVING (ADL)
ADLS_ACUITY_SCORE: 18

## 2024-09-13 NOTE — PROGRESS NOTES
Reactive NST [id number]  Baseline 130bm with moderate variability and accels present. No contractions noted on the monitor and patient reports none. Palpating soft abdomen. NST verified by Emilee Anderson RN

## 2024-09-13 NOTE — PLAN OF CARE
Goal Outcome Evaluation:      Plan of Care Reviewed With: patient    Overall Patient Progress: improvingOverall Patient Progress: improving       Problem: Labor  Goal: Stable Fetal Wellbeing  Outcome: Progressing  Goal: Effective Progression to Delivery  Outcome: Progressing

## 2024-09-13 NOTE — PROGRESS NOTES
[facility] Labor and Delivery Progress Note    Kelly Shannon MRN# [id number]   Age: 39 year old YOB: 1985           Subjective:   Patient seen with  on language line.  Does continues to note LOF, denies blood or contractions.  Surprised she has not labored.           Objective:   Patient Vitals for the past 24 hrs:   BP Temp Temp src Resp Oximeter Heart Rate   24 0536 108/59 98.1  F (36.7  C) Oral 16 57 bpm   24 2151 107/65 98.1  F (36.7  C) Oral 16 63 bpm   24 1515 99/54 97.8  F (36.6  C) Oral 16 64 bpm   24 1200 107/59 97.8  F (36.6  C) Oral 18 --         Cervical Exam: 2 / 50% / -3        Membranes: Leaking     Fetal Heart Rate:    Monitor: external US    Variability: moderate (amplitude range 6 to 25 bpm)    Baseline Rate: normal range    Fetal Heart Rate Tracing: NST reactive          Assessment:   Kelly Shannon is a 39 year old  who is 33w6d here with PPROM, GBS pos, syphyllis and diet controlled GDM          Plan:   S/p betamethasone, remains on latency antibiotics, did receive IM PCN for treatment of syphillis and sliding scale insulin coverage for blood glucose fluctuations after steroids.  No signs or symptoms of triple I.  Plan is to induce tomorrow at 34 weeks, will need GBS prophylaxis and glucose monitoring in active labor. All questions answered.      Lia Apple MD

## 2024-09-13 NOTE — PLAN OF CARE
Goal Outcome Evaluation:      Plan of Care Reviewed With: patient    Overall Patient Progress: improvingOverall Patient Progress: improving    Outcome Evaluation: Vital sign stable and afebrile. Denies any contraction. NST reactive. Moderate variability with accels and no decels noted.

## 2024-09-13 NOTE — PROCEDURES
NST Procedure note    Date: 9/13/2024    Indication: PPROM    Procedure: Fetal non-stress test    Results: Reactive    Lia Apple MD  9/13/2024 8:36 AM

## 2024-09-14 ENCOUNTER — OFFICE VISIT (OUTPATIENT)
Dept: INTERPRETER SERVICES | Facility: CLINIC | Age: 39
End: 2024-09-14
Payer: COMMERCIAL

## 2024-09-14 LAB
GLUCOSE BLDC GLUCOMTR-MCNC: 76 MG/DL (ref 70–99)
GLUCOSE BLDC GLUCOMTR-MCNC: 90 MG/DL (ref 70–99)
HOLD SPECIMEN: NORMAL
T PALLIDUM AB SER QL: REACTIVE

## 2024-09-14 PROCEDURE — 258N000003 HC RX IP 258 OP 636: Performed by: OBSTETRICS & GYNECOLOGY

## 2024-09-14 PROCEDURE — 120N000001 HC R&B MED SURG/OB

## 2024-09-14 PROCEDURE — 86592 SYPHILIS TEST NON-TREP QUAL: CPT | Performed by: OBSTETRICS & GYNECOLOGY

## 2024-09-14 PROCEDURE — 88307 TISSUE EXAM BY PATHOLOGIST: CPT | Mod: TC | Performed by: OBSTETRICS & GYNECOLOGY

## 2024-09-14 PROCEDURE — 999N000016 HC STATISTIC ATTENDANCE AT DELIVERY

## 2024-09-14 PROCEDURE — 722N000001 HC LABOR CARE VAGINAL DELIVERY SINGLE

## 2024-09-14 PROCEDURE — 86780 TREPONEMA PALLIDUM: CPT | Performed by: OBSTETRICS & GYNECOLOGY

## 2024-09-14 PROCEDURE — T1013 SIGN LANG/ORAL INTERPRETER: HCPCS | Mod: U3

## 2024-09-14 PROCEDURE — 0HQ9XZZ REPAIR PERINEUM SKIN, EXTERNAL APPROACH: ICD-10-PCS | Performed by: OBSTETRICS & GYNECOLOGY

## 2024-09-14 PROCEDURE — 250N000009 HC RX 250: Performed by: OBSTETRICS & GYNECOLOGY

## 2024-09-14 PROCEDURE — 250N000013 HC RX MED GY IP 250 OP 250 PS 637: Performed by: OBSTETRICS & GYNECOLOGY

## 2024-09-14 PROCEDURE — 250N000011 HC RX IP 250 OP 636: Performed by: OBSTETRICS & GYNECOLOGY

## 2024-09-14 PROCEDURE — 3E033VJ INTRODUCTION OF OTHER HORMONE INTO PERIPHERAL VEIN, PERCUTANEOUS APPROACH: ICD-10-PCS | Performed by: OBSTETRICS & GYNECOLOGY

## 2024-09-14 PROCEDURE — 36415 COLL VENOUS BLD VENIPUNCTURE: CPT | Performed by: OBSTETRICS & GYNECOLOGY

## 2024-09-14 PROCEDURE — 88307 TISSUE EXAM BY PATHOLOGIST: CPT | Mod: 26 | Performed by: PATHOLOGY

## 2024-09-14 RX ORDER — METOCLOPRAMIDE 10 MG/1
10 TABLET ORAL EVERY 6 HOURS PRN
Status: DISCONTINUED | OUTPATIENT
Start: 2024-09-14 | End: 2024-09-14 | Stop reason: HOSPADM

## 2024-09-14 RX ORDER — LOPERAMIDE HCL 2 MG
2 CAPSULE ORAL
Status: DISCONTINUED | OUTPATIENT
Start: 2024-09-14 | End: 2024-09-15 | Stop reason: HOSPADM

## 2024-09-14 RX ORDER — OXYTOCIN 10 [USP'U]/ML
10 INJECTION, SOLUTION INTRAMUSCULAR; INTRAVENOUS
Status: DISCONTINUED | OUTPATIENT
Start: 2024-09-14 | End: 2024-09-15 | Stop reason: HOSPADM

## 2024-09-14 RX ORDER — OXYTOCIN 10 [USP'U]/ML
10 INJECTION, SOLUTION INTRAMUSCULAR; INTRAVENOUS
Status: DISCONTINUED | OUTPATIENT
Start: 2024-09-14 | End: 2024-09-14 | Stop reason: HOSPADM

## 2024-09-14 RX ORDER — BISACODYL 10 MG
10 SUPPOSITORY, RECTAL RECTAL DAILY PRN
Status: DISCONTINUED | OUTPATIENT
Start: 2024-09-14 | End: 2024-09-15 | Stop reason: HOSPADM

## 2024-09-14 RX ORDER — KETOROLAC TROMETHAMINE 30 MG/ML
30 INJECTION, SOLUTION INTRAMUSCULAR; INTRAVENOUS
Status: COMPLETED | OUTPATIENT
Start: 2024-09-14 | End: 2024-09-14

## 2024-09-14 RX ORDER — METHYLERGONOVINE MALEATE 0.2 MG/ML
200 INJECTION INTRAVENOUS
Status: DISCONTINUED | OUTPATIENT
Start: 2024-09-14 | End: 2024-09-14 | Stop reason: HOSPADM

## 2024-09-14 RX ORDER — OXYTOCIN/0.9 % SODIUM CHLORIDE 30/500 ML
1-24 PLASTIC BAG, INJECTION (ML) INTRAVENOUS CONTINUOUS
Status: DISCONTINUED | OUTPATIENT
Start: 2024-09-14 | End: 2024-09-14 | Stop reason: HOSPADM

## 2024-09-14 RX ORDER — LOPERAMIDE HCL 2 MG
4 CAPSULE ORAL
Status: DISCONTINUED | OUTPATIENT
Start: 2024-09-14 | End: 2024-09-15 | Stop reason: HOSPADM

## 2024-09-14 RX ORDER — ACETAMINOPHEN 325 MG/1
650 TABLET ORAL EVERY 4 HOURS PRN
Status: DISCONTINUED | OUTPATIENT
Start: 2024-09-14 | End: 2024-09-15 | Stop reason: HOSPADM

## 2024-09-14 RX ORDER — NALOXONE HYDROCHLORIDE 0.4 MG/ML
0.2 INJECTION, SOLUTION INTRAMUSCULAR; INTRAVENOUS; SUBCUTANEOUS
Status: DISCONTINUED | OUTPATIENT
Start: 2024-09-14 | End: 2024-09-14 | Stop reason: HOSPADM

## 2024-09-14 RX ORDER — LOPERAMIDE HCL 2 MG
2 CAPSULE ORAL
Status: DISCONTINUED | OUTPATIENT
Start: 2024-09-14 | End: 2024-09-14 | Stop reason: HOSPADM

## 2024-09-14 RX ORDER — ONDANSETRON 2 MG/ML
4 INJECTION INTRAMUSCULAR; INTRAVENOUS EVERY 6 HOURS PRN
Status: DISCONTINUED | OUTPATIENT
Start: 2024-09-14 | End: 2024-09-14 | Stop reason: HOSPADM

## 2024-09-14 RX ORDER — LOPERAMIDE HCL 2 MG
4 CAPSULE ORAL
Status: DISCONTINUED | OUTPATIENT
Start: 2024-09-14 | End: 2024-09-14 | Stop reason: HOSPADM

## 2024-09-14 RX ORDER — IBUPROFEN 800 MG/1
800 TABLET, FILM COATED ORAL
Status: COMPLETED | OUTPATIENT
Start: 2024-09-14 | End: 2024-09-14

## 2024-09-14 RX ORDER — DEXTROSE MONOHYDRATE 25 G/50ML
25-50 INJECTION, SOLUTION INTRAVENOUS
Status: DISCONTINUED | OUTPATIENT
Start: 2024-09-14 | End: 2024-09-14

## 2024-09-14 RX ORDER — OXYTOCIN/0.9 % SODIUM CHLORIDE 30/500 ML
100-340 PLASTIC BAG, INJECTION (ML) INTRAVENOUS CONTINUOUS PRN
Status: DISCONTINUED | OUTPATIENT
Start: 2024-09-14 | End: 2024-09-15 | Stop reason: HOSPADM

## 2024-09-14 RX ORDER — NICOTINE POLACRILEX 4 MG
15-30 LOZENGE BUCCAL
Status: DISCONTINUED | OUTPATIENT
Start: 2024-09-14 | End: 2024-09-14

## 2024-09-14 RX ORDER — MISOPROSTOL 200 UG/1
400 TABLET ORAL
Status: DISCONTINUED | OUTPATIENT
Start: 2024-09-14 | End: 2024-09-14 | Stop reason: HOSPADM

## 2024-09-14 RX ORDER — PENICILLIN G 3000000 [IU]/50ML
3 INJECTION, SOLUTION INTRAVENOUS EVERY 4 HOURS
Status: DISCONTINUED | OUTPATIENT
Start: 2024-09-14 | End: 2024-09-14 | Stop reason: HOSPADM

## 2024-09-14 RX ORDER — FENTANYL CITRATE 50 UG/ML
50 INJECTION, SOLUTION INTRAMUSCULAR; INTRAVENOUS EVERY 30 MIN PRN
Status: DISCONTINUED | OUTPATIENT
Start: 2024-09-14 | End: 2024-09-14 | Stop reason: HOSPADM

## 2024-09-14 RX ORDER — TRANEXAMIC ACID 10 MG/ML
1 INJECTION, SOLUTION INTRAVENOUS EVERY 30 MIN PRN
Status: DISCONTINUED | OUTPATIENT
Start: 2024-09-14 | End: 2024-09-15 | Stop reason: HOSPADM

## 2024-09-14 RX ORDER — MISOPROSTOL 200 UG/1
800 TABLET ORAL
Status: DISCONTINUED | OUTPATIENT
Start: 2024-09-14 | End: 2024-09-14 | Stop reason: HOSPADM

## 2024-09-14 RX ORDER — CARBOPROST TROMETHAMINE 250 UG/ML
250 INJECTION, SOLUTION INTRAMUSCULAR
Status: DISCONTINUED | OUTPATIENT
Start: 2024-09-14 | End: 2024-09-14 | Stop reason: HOSPADM

## 2024-09-14 RX ORDER — MODIFIED LANOLIN
OINTMENT (GRAM) TOPICAL
Status: DISCONTINUED | OUTPATIENT
Start: 2024-09-14 | End: 2024-09-15 | Stop reason: HOSPADM

## 2024-09-14 RX ORDER — NICOTINE POLACRILEX 4 MG
15-30 LOZENGE BUCCAL
Status: DISCONTINUED | OUTPATIENT
Start: 2024-09-14 | End: 2024-09-15 | Stop reason: HOSPADM

## 2024-09-14 RX ORDER — PROCHLORPERAZINE MALEATE 10 MG
10 TABLET ORAL EVERY 6 HOURS PRN
Status: DISCONTINUED | OUTPATIENT
Start: 2024-09-14 | End: 2024-09-14 | Stop reason: HOSPADM

## 2024-09-14 RX ORDER — DOCUSATE SODIUM 100 MG/1
100 CAPSULE, LIQUID FILLED ORAL DAILY
Status: DISCONTINUED | OUTPATIENT
Start: 2024-09-14 | End: 2024-09-15 | Stop reason: HOSPADM

## 2024-09-14 RX ORDER — CITRIC ACID/SODIUM CITRATE 334-500MG
30 SOLUTION, ORAL ORAL
Status: DISCONTINUED | OUTPATIENT
Start: 2024-09-14 | End: 2024-09-14 | Stop reason: HOSPADM

## 2024-09-14 RX ORDER — OXYTOCIN/0.9 % SODIUM CHLORIDE 30/500 ML
340 PLASTIC BAG, INJECTION (ML) INTRAVENOUS CONTINUOUS PRN
Status: DISCONTINUED | OUTPATIENT
Start: 2024-09-14 | End: 2024-09-15 | Stop reason: HOSPADM

## 2024-09-14 RX ORDER — IBUPROFEN 800 MG/1
800 TABLET, FILM COATED ORAL EVERY 6 HOURS PRN
Status: DISCONTINUED | OUTPATIENT
Start: 2024-09-14 | End: 2024-09-15 | Stop reason: HOSPADM

## 2024-09-14 RX ORDER — TRANEXAMIC ACID 10 MG/ML
1 INJECTION, SOLUTION INTRAVENOUS EVERY 30 MIN PRN
Status: DISCONTINUED | OUTPATIENT
Start: 2024-09-14 | End: 2024-09-14 | Stop reason: HOSPADM

## 2024-09-14 RX ORDER — LIDOCAINE 40 MG/G
CREAM TOPICAL
Status: DISCONTINUED | OUTPATIENT
Start: 2024-09-14 | End: 2024-09-14 | Stop reason: HOSPADM

## 2024-09-14 RX ORDER — PROCHLORPERAZINE 25 MG
25 SUPPOSITORY, RECTAL RECTAL EVERY 12 HOURS PRN
Status: DISCONTINUED | OUTPATIENT
Start: 2024-09-14 | End: 2024-09-14 | Stop reason: HOSPADM

## 2024-09-14 RX ORDER — OXYTOCIN/0.9 % SODIUM CHLORIDE 30/500 ML
340 PLASTIC BAG, INJECTION (ML) INTRAVENOUS CONTINUOUS PRN
Status: DISCONTINUED | OUTPATIENT
Start: 2024-09-14 | End: 2024-09-14 | Stop reason: HOSPADM

## 2024-09-14 RX ORDER — MISOPROSTOL 200 UG/1
800 TABLET ORAL
Status: DISCONTINUED | OUTPATIENT
Start: 2024-09-14 | End: 2024-09-15 | Stop reason: HOSPADM

## 2024-09-14 RX ORDER — NALOXONE HYDROCHLORIDE 0.4 MG/ML
0.4 INJECTION, SOLUTION INTRAMUSCULAR; INTRAVENOUS; SUBCUTANEOUS
Status: DISCONTINUED | OUTPATIENT
Start: 2024-09-14 | End: 2024-09-14 | Stop reason: HOSPADM

## 2024-09-14 RX ORDER — SODIUM CHLORIDE 9 MG/ML
INJECTION, SOLUTION INTRAVENOUS CONTINUOUS PRN
Status: DISCONTINUED | OUTPATIENT
Start: 2024-09-14 | End: 2024-09-15 | Stop reason: HOSPADM

## 2024-09-14 RX ORDER — METOCLOPRAMIDE HYDROCHLORIDE 5 MG/ML
10 INJECTION INTRAMUSCULAR; INTRAVENOUS EVERY 6 HOURS PRN
Status: DISCONTINUED | OUTPATIENT
Start: 2024-09-14 | End: 2024-09-14 | Stop reason: HOSPADM

## 2024-09-14 RX ORDER — METHYLERGONOVINE MALEATE 0.2 MG/ML
200 INJECTION INTRAVENOUS
Status: DISCONTINUED | OUTPATIENT
Start: 2024-09-14 | End: 2024-09-15 | Stop reason: HOSPADM

## 2024-09-14 RX ORDER — MISOPROSTOL 200 UG/1
400 TABLET ORAL
Status: DISCONTINUED | OUTPATIENT
Start: 2024-09-14 | End: 2024-09-15 | Stop reason: HOSPADM

## 2024-09-14 RX ORDER — CARBOPROST TROMETHAMINE 250 UG/ML
250 INJECTION, SOLUTION INTRAMUSCULAR
Status: DISCONTINUED | OUTPATIENT
Start: 2024-09-14 | End: 2024-09-15 | Stop reason: HOSPADM

## 2024-09-14 RX ORDER — SODIUM CHLORIDE, SODIUM LACTATE, POTASSIUM CHLORIDE, CALCIUM CHLORIDE 600; 310; 30; 20 MG/100ML; MG/100ML; MG/100ML; MG/100ML
INJECTION, SOLUTION INTRAVENOUS CONTINUOUS PRN
Status: DISCONTINUED | OUTPATIENT
Start: 2024-09-14 | End: 2024-09-14 | Stop reason: HOSPADM

## 2024-09-14 RX ORDER — ONDANSETRON 4 MG/1
4 TABLET, ORALLY DISINTEGRATING ORAL EVERY 6 HOURS PRN
Status: DISCONTINUED | OUTPATIENT
Start: 2024-09-14 | End: 2024-09-14 | Stop reason: HOSPADM

## 2024-09-14 RX ORDER — PENICILLIN G POTASSIUM 5000000 [IU]/1
5 INJECTION, POWDER, FOR SOLUTION INTRAMUSCULAR; INTRAVENOUS ONCE
Status: COMPLETED | OUTPATIENT
Start: 2024-09-14 | End: 2024-09-14

## 2024-09-14 RX ORDER — HYDROCORTISONE 25 MG/G
CREAM TOPICAL 3 TIMES DAILY PRN
Status: DISCONTINUED | OUTPATIENT
Start: 2024-09-14 | End: 2024-09-15 | Stop reason: HOSPADM

## 2024-09-14 RX ORDER — TERBUTALINE SULFATE 1 MG/ML
0.25 INJECTION, SOLUTION SUBCUTANEOUS
Status: DISCONTINUED | OUTPATIENT
Start: 2024-09-14 | End: 2024-09-14 | Stop reason: HOSPADM

## 2024-09-14 RX ORDER — DEXTROSE MONOHYDRATE 25 G/50ML
25-50 INJECTION, SOLUTION INTRAVENOUS
Status: DISCONTINUED | OUTPATIENT
Start: 2024-09-14 | End: 2024-09-15 | Stop reason: HOSPADM

## 2024-09-14 RX ORDER — DEXTROSE MONOHYDRATE 100 MG/ML
INJECTION, SOLUTION INTRAVENOUS CONTINUOUS PRN
Status: DISCONTINUED | OUTPATIENT
Start: 2024-09-14 | End: 2024-09-15 | Stop reason: HOSPADM

## 2024-09-14 RX ORDER — ACETAMINOPHEN 325 MG/1
650 TABLET ORAL EVERY 4 HOURS PRN
Status: DISCONTINUED | OUTPATIENT
Start: 2024-09-14 | End: 2024-09-14 | Stop reason: HOSPADM

## 2024-09-14 RX ADMIN — Medication 340 ML/HR: at 13:36

## 2024-09-14 RX ADMIN — KETOROLAC TROMETHAMINE 30 MG: 30 INJECTION, SOLUTION INTRAMUSCULAR at 14:05

## 2024-09-14 RX ADMIN — Medication 2 MILLI-UNITS/MIN: at 09:29

## 2024-09-14 RX ADMIN — PENICILLIN G POTASSIUM 5 MILLION UNITS: 5000000 POWDER, FOR SOLUTION INTRAMUSCULAR; INTRAPLEURAL; INTRATHECAL; INTRAVENOUS at 10:41

## 2024-09-14 RX ADMIN — SODIUM CHLORIDE, POTASSIUM CHLORIDE, SODIUM LACTATE AND CALCIUM CHLORIDE: 600; 310; 30; 20 INJECTION, SOLUTION INTRAVENOUS at 09:29

## 2024-09-14 RX ADMIN — LIDOCAINE HYDROCHLORIDE 30 ML: 10 INJECTION, SOLUTION EPIDURAL; INFILTRATION; INTRACAUDAL; PERINEURAL at 13:42

## 2024-09-14 RX ADMIN — DOCUSATE SODIUM 100 MG: 100 CAPSULE, LIQUID FILLED ORAL at 14:05

## 2024-09-14 ASSESSMENT — ACTIVITIES OF DAILY LIVING (ADL)
ADLS_ACUITY_SCORE: 20
ADLS_ACUITY_SCORE: 18
ADLS_ACUITY_SCORE: 20
ADLS_ACUITY_SCORE: 20
ADLS_ACUITY_SCORE: 21
ADLS_ACUITY_SCORE: 20
ADLS_ACUITY_SCORE: 18
ADLS_ACUITY_SCORE: 18
ADLS_ACUITY_SCORE: 20
ADLS_ACUITY_SCORE: 18
ADLS_ACUITY_SCORE: 21
ADLS_ACUITY_SCORE: 20
ADLS_ACUITY_SCORE: 18
ADLS_ACUITY_SCORE: 21
ADLS_ACUITY_SCORE: 18
ADLS_ACUITY_SCORE: 21
ADLS_ACUITY_SCORE: 18
ADLS_ACUITY_SCORE: 20
ADLS_ACUITY_SCORE: 20

## 2024-09-14 NOTE — PLAN OF CARE
Problem: Adult Inpatient Plan of Care  Goal: Plan of Care Review  Description: The Plan of Care Review/Shift note should be completed every shift.  The Outcome Evaluation is a brief statement about your assessment that the patient is improving, declining, or no change.  This information will be displayed automatically on your shift  note.  Flowsheets (Taken 9/14/2024 9936)  Outcome Evaluation: Vital signs stable, pitocin started and IV ABX for GBS+ will be started when in active labor. Blood sugar WNL fasting   Goal Outcome Evaluation:                 Outcome Evaluation: Vital signs stable, pitocin started and IV ABX for GBS+ will be started when in active labor. Blood sugar WNL fasting

## 2024-09-14 NOTE — L&D DELIVERY NOTE
OB Vaginal Delivery Note    Kelly Shannon MRN# [id number]   Age: 39 year old YOB: 1985       GA: 34w0d  GP:   Labor Complications: None   EBL:   mL  Delivery QBL: 200 mL  Delivery Type: , Spontaneous   ROM to Delivery Time: (Delivered) Days: 4 Hours: 11 Minutes: 20   Weight: 2.26 kg (4 lb 15.7 oz)    1 Minute 5 Minute 10 Minute   Apgar Totals: 8   9        KALEIGH BEE;KENNETH RIOS;CAPO ORTEGA;STEPHANY PERSON;LISA HARDIN;PATRICA TESFAYE     Delivery Details:  Kelly Shannon, a 39 year old  female delivered a viable infant with apgars of 8  and 9 . Patient was fully dilated and pushing after   hours   minutes in active labor. Delivery was via , spontaneous  to a sterile field under none  anesthesia. Infant delivered in vertex    occiput  anterior  position. Anterior and posterior shoulders delivered without difficulty. The cord was clamped, cut twice and   were noted. Cord blood was obtained in routine fashion with the following disposition:  .      Cord complications: none   Placenta delivered at 2024  1:20 PM . Placental disposition was Pathology . Fundal massage performed and fundus found to be firm.     Episiotomy: none    Perineum, vagina, cervix were inspected, and the following lacerations were noted:   Perineal lacerations: 1st                Any lacerations were repaired in the usual fashion using  2-0 vicryl.    Excellent hemostasis was noted. Needle count correct. Infant and patient in delivery room in good and stable condition.        Chirag Shannon-Kelly [[id number]]      Labor Event Times      Start pushing date/time: 2024 1318          Labor Events     labor?: Yes   steroids: Full Course  Labor Type: Induction/Cervical ripening       Rupture date/time: 9/10/24 0200   Rupture type: Spontaneous Rupture of Membranes  Fluid color: Clear  Fluid odor: Normal     Induction: Oxytocin  Induction date/time:      Cervical ripening  date/time:      Indications for induction: Other Pregnant Patient Indications (Comment to specify)       Delivery/Placenta Date and Time      Delivery Date: 24 Delivery Time:  1:20 PM   Placenta Date/Time: 2024  1:20 PM  Oxytocin given at the time of delivery: after delivery of baby  Delivering clinician: Anastasia Piper MD   Other personnel present at delivery:  Provider Role   Apurva Ayala, RN Delivery Nurse   Haylee Lindsey, RN Registered Nurse   Kadi Acosta RN Registered Nurse   Layla Ferrer RN Registered Nurse   Jocelyne Stark, RT Respiratory Therapist   Ramona Hayes, NP Nurse Practitioner             Apgars    Living status: Living   1 Minute 5 Minute 10 Minute 15 Minute 20 Minute   Skin color: 0  1       Heart rate: 2  2       Reflex irritability: 2  2       Muscle tone: 2  2       Respiratory effort: 2  2       Total: 8  9       Apgars assigned by: ROMAN FERNÁNDEZ CNP       Cord      Cord Complications: None               Stem cell collection?: No            Resuscitation     Care at Delivery: Called for delivery of 34 week  for PROM. Infant delivered, was brought to mother's abdomen, cord clamped and cut and brought to the resuscitation warmer, was dried and stimulated. He required no further resuscitation. He will be admitted to  eNICU for prematurity  ROMAN Fernández CNP  Output in Delivery Room: Voided       [place] Measurements      Weight: 4 lb 15.7 oz    Output in delivery room: Voided       Skin to Skin and Feeding Plan      Skin to skin initiation date/time: 1841    Skin to skin with: Mother  Skin to skin end date/time:            Labor Events and Shoulder Dystocia    Fetal Tracing Prior to Delivery: Category 1  Shoulder dystocia present?: Neg       Delivery (Maternal) (Provider to Complete) (194313)    Episiotomy: None  Perineal lacerations: 1st Repaired?: Yes   Repair suture: 2-0 Vicryl  Number of repair packets: 1  Genital tract  inspection done: Pos       Blood Loss  Mother: Kelly Shannon #[id number]     Start of Mother's Information      Delivery Blood Loss  24 0120 - 24 1336      Delivery QBL (mL) Hospital Encounter 200 mL    Total  200 mL               End of Mother's Information  Mother: Kelly Shannon #[id number]                Delivery - Provider to Complete ([id number])    Delivering clinician: Anastasia Piper MD  Delivery Type (Choose the 1 that will go to the Birth History): , Spontaneous                         Other personnel:  Provider Role   Apurva Ayala, NICK Delivery Nurse   Haylee Lindsey, RN Registered Nurse   Kadi Acosta RN Registered Nurse   Layla Ferrer RN Registered Nurse   Jocelyne Stark, RT Respiratory Therapist   Ramona Hayes, NP Nurse Practitioner                    Placenta    Date/Time: 2024  1:20 PM  Removal: Spontaneous  Comments: placenta delivered with baby  Disposition: Pathology             Anesthesia    Method: None                    Presentation and Position    Presentation: Vertex     Occiput Anterior                     Anastasia Piper MD

## 2024-09-14 NOTE — PROGRESS NOTES
Kelly Shannon is a 39 year old  who is 34 0/7 here with PPROM, GBS pos, syphyllis and diet controlled GDM and GBS positive will now need pitocin induction.  I did a bedside ultrasound and confirmed she is still vertex.    Using the assistance of a [employer] interpretor I reviewed the plan to start the pitocin.  She is fearful of the pitocin.  Discussed she may have more intense contractions.  She is contemplating pain control options.  Her  will cut the cord.  She wants a tubal ligation but she is not sure if her  is in agreement.  We discussed tubal if we do a , PPTL or doing an interval tubal ligation.  She is still not sure so I suggested doing an interval tubal ligation but ultimately we will do what she requests.  Labor pitocin and insulin orders placed.   Plan for vaginal delivery.  Her  was for breech position in .   JENNIFER Piper MD

## 2024-09-14 NOTE — PLAN OF CARE
Goal Outcome Evaluation:                 Outcome Evaluation: Vital signs stable, pitocin started and IV ABX for GBS+ will be started when in active labor. Blood sugar WNL fasting    Patient complete at 1308  Patient pushing at 1318  Delivery of viable male at 1320  Baby placed skin to skin  Placenta delivered at 1320  Pitocin started per provider at 1320  Toradol/Ibuprofen given at 1405  Apurva Ayala RN  9/14/2024 2:30 PM

## 2024-09-14 NOTE — LACTATION NOTE
Spoke to Kelly briefly,  educated her on pumping sooner then later, she declined and verbalized that she would like to start pumping later this evening.   Floor nurse aware.

## 2024-09-14 NOTE — PROGRESS NOTES
S: Triage Note  B: Kelly Shannon is a 39 year old  at 34w0d gestation who delivered by /VAVD/ on 2024 at 1320. Delivery QBL (mL): 200      Significant PMH: GDM-Diet    A: Fundus is firm at unit(s)/-1, lochia is rubra, Inc/Lac/Epis 1st degree lac    R: Transfer complete at 9/10/24 and bedside report given to NICK Nicholson.    NICK FARRIS

## 2024-09-14 NOTE — PROGRESS NOTES
D:  Patient admitted to [facility]WFTK[facility]-8 via wheelchair with  support person Nika.  A:  Bedside report received from Apurva MORROW RN. Oriented patient and family to surroundings; call light within reach.   R:  Patient tolerated transfer. Care assumed at 1530.    Lyn Palmer RN on 9/14/2024 at 6:53 PM

## 2024-09-15 ENCOUNTER — APPOINTMENT (OUTPATIENT)
Dept: INTERPRETER SERVICES | Facility: CLINIC | Age: 39
End: 2024-09-15
Payer: COMMERCIAL

## 2024-09-15 VITALS
HEART RATE: 63 BPM | OXYGEN SATURATION: 98 % | RESPIRATION RATE: 16 BRPM | TEMPERATURE: 97.7 F | SYSTOLIC BLOOD PRESSURE: 97 MMHG | WEIGHT: 138 LBS | DIASTOLIC BLOOD PRESSURE: 47 MMHG | BODY MASS INDEX: 25.65 KG/M2

## 2024-09-15 LAB — GLUCOSE BLDC GLUCOMTR-MCNC: 102 MG/DL (ref 70–99)

## 2024-09-15 PROCEDURE — 250N000013 HC RX MED GY IP 250 OP 250 PS 637: Performed by: OBSTETRICS & GYNECOLOGY

## 2024-09-15 RX ADMIN — IBUPROFEN 800 MG: 800 TABLET ORAL at 06:31

## 2024-09-15 RX ADMIN — DOCUSATE SODIUM 100 MG: 100 CAPSULE, LIQUID FILLED ORAL at 10:28

## 2024-09-15 RX ADMIN — IBUPROFEN 800 MG: 800 TABLET ORAL at 00:34

## 2024-09-15 ASSESSMENT — ACTIVITIES OF DAILY LIVING (ADL)
ADLS_ACUITY_SCORE: 21

## 2024-09-15 NOTE — DISCHARGE SUMMARY
[facility]    Discharge Summary  Obstetrics    Date of Admission:  9/10/2024  Date of Discharge:  No discharge date for patient encounter.  Discharging Provider: Luisito Mckay MD    Discharge Diagnoses   PPROM      History of Present Illness   Kelly Shannon is a 39 year old female who presented with PPROM and hd antibiotics and Mag and then an IOL at 34 weeks.     Hospital Course     She recovered as anticipated and experienced no post-delivery complications.  On discharge, her pain was well controlled. Vaginal bleeding is similar to peak menstrual flow.  Voiding without difficulty.  Ambulating well and tolerating a normal diet.  No fevers.  Breastfeeding well.  Infant is stable.  She was discharged on post-partum day #2.    Post-partum hemoglobin:   Hemoglobin   Date Value Ref Range Status   2024 10.6 (L) 11.7 - 15.7 g/dL Final       [place] Depression Scale  Thoughts of Harming Self:    Total Score:        Luisito Mckay MD    Discharge Disposition   Discharged to home   Condition at discharge: Good    Primary Care Physician   [facility]    Consultations This Hospital Stay   NEONATOLOGY IP CONSULT  NURSE PRACT  IP CONSULT  LACTATION IP CONSULT    Discharge Orders      Activity    Activity as tolerated     Reason for your hospital stay    Maternity care     Follow Up    Follow up with provider in 2 weeks and 6 weeks for post-delivery checks     Postpartum Exercises for vaginal delivery    These exercises may be started soon after delivery. If you feel tired or uncomfortable, stop and try these exercises after resting. Check for any separation in your stomach wall before doing exercises that involve twisting or stress on your stomach muscles. To check this place your fingers in the center of your upper abdomen and lift your head and shoulders up in a partial sit-up. If you feel a separation in your muscle wall, it is too soon to do sit  ups.   1) Tighten and relax your pelvic floor muscles often.   2) Breathe deeply while laying on your back. As you breathe out, lift just your head up and pull the sides of your stomach toward the middle with your hands. Then breathe in as you put your head down. This will help to close the separation of your stomach.   3) Tilt your pelvis back and forth. Alternate this with full body stretches.   4) Move your feet back and forth, then in circular motions.   5) While lying on your back, slide your heels toward your hips and bend your knees one at a time, then together.   6) While lying flat on the floor, bend your knees and raise your legs one at a time.   7) When the stomach wall separation has closed, you can progress to straight curl-ups, diagonal curl-ups, and side leg lifts.     Breast pump - Manual/Electric    Breast Pump Documentation:  Manual/Electric Pump: To support adequate breast milk production and nutrition for infant.     I, the undersigned, certify that the above prescribed supplies are medically necessary for this patient and is both reasonable and necessary in reference to accepted standards of medical and necessary in reference to accepted standards of medical practice in the treatment of this patient's condition and is not prescribed as a convenience.     Diet    Resume previous diet     Discharge Medications   Current Discharge Medication List        CONTINUE these medications which have NOT CHANGED    Details   diclofenac (VOLTAREN) 1 % topical gel Apply 2 grams to left hip up to 4 times daily not to exceed 8 grams per day.  Qty: 100 g, Refills: 1    Associated Diagnoses: Strain of lumbar region, initial encounter      nitroFURantoin macrocrystal-monohydrate (MACROBID) 100 MG capsule       ondansetron (ZOFRAN ODT) 4 MG ODT tab Take 1 tablet (4 mg) by mouth every 8 hours as needed for nausea  Qty: 20 tablet, Refills: 0    Associated Diagnoses: Nausea and vomiting, unspecified vomiting type       Prenatal Vit-Fe Fumarate-FA (PRENATAL MULTIVITAMIN  PLUS IRON) 27-1 MG TABS Take by mouth daily           Allergies   No Known Allergies

## 2024-09-15 NOTE — LACTATION NOTE
Reviewed breast pump coverage online for the [place] [employer] finding that [place] is now covering breast pumps with every pregnancy.   Strongly encouraged mom to take the symphony breast pump for up to 3 months, she is familiar with the pump she used the pump with her last baby.  Mom declined taking any breast pump with her and will reach out if her current breast pump does not work.

## 2024-09-15 NOTE — PLAN OF CARE
Goal Outcome Evaluation:      Plan of Care Reviewed With: patient    Overall Patient Progress: improvingOverall Patient Progress: improving    VSS. Encouraged patient to start pumping. Scant amount of lochia, no clots noted. Tylenol and Ibuprofen for pain control. BS audible/active x4, tolerating PO, denies N/V. Voiding. Visiting infant in NICU during the day/evening, appears to be coping well with NICU admission, speaking positively of infant. Significant other not present at bedside. Continue with plan of care.

## 2024-09-15 NOTE — PLAN OF CARE
Goal Outcome Evaluation:  Patient meets criteria for discharge, has been seen by provider and orders received. Reviewed discharge instructions with patient and spouse. Patient discharged home accompanied by spouse and several family members.

## 2024-09-15 NOTE — LACTATION NOTE
Reason for Initial Lactation Visit: Lactation consultant to patient room per lactation order as infant in NICU.    Reason for infant admission to NICU: prematurity,  sepsis     Gestational Age at Delivery: 34w0d     Hours of age: 19 hours     Method of Feedings: gavage      Breastfeeding goals:breast + formula    Past breastfeeding experience: reports she did breastfeed other children.    Maternal health risk that may affect breastfeeding:TIARRA MORRIS    Breast Assessment:did not assess     Pumping Assessment: did not assess- Hlee declined support with pumping and will pump after she eats breakfast, she did requests reminder for how to turn pump on, encouraged mom to pump at least 8 times even if she is does not see any milk, reviewed quickly the FDK.       Pump for home use: she has a pump at home, <2 years ago.  Encouraged to call insurance company to check coverage for symphony         Education:  [x] First drops kit  [] Benefits of breast milk  [x] How breast milk is made  [x] Stages of milk production  [x] Milk supply/goal volumes  [x] Hand expression  [x] Collecting, labeling, transporting milk  [x] Cleaning, sanitizing pump parts  [] Storage of milk  [x] Importance of pumping minimum of 8x in 24 hours   [] Hands on Pumping   [x] Hospital grade pump use and care  [x] Initiate setting   [] Maintain setting  [] How to rent a hospital grade breast pump  [] Engorgement  [] Latch and positioning   [] Signs of milk transfer  [] Nuzzling  [x] Review how to access lactation consultant prn

## 2024-09-15 NOTE — DISCHARGE INSTRUCTIONS
Warning Signs after Having a Baby    Keep this paper on your fridge or somewhere else where you can see it.    Call your provider if you have any of these symptoms up to 12 weeks after having your baby.    Thoughts of hurting yourself or your baby  Pain in your chest or trouble breathing  Severe headache not helped by pain medicine  Eyesight concerns (blurry vision, seeing spots or flashes of light, other changes to eyesight)  Fainting, shaking or other signs of a seizure    Call 9-1-1 if you feel that it is an emergency.     The symptoms below can happen to anyone after giving birth. They can be very serious. Call your provider if you have any of these warning signs.    My provider s phone number: _______________________    Losing too much blood (hemorrhage)    Call your provider if you soak through a pad in less than an hour or pass blood clots bigger than a golf ball. These may be signs that you are bleeding too much.    Blood clots in the legs or lungs    After you give birth, your body naturally clots its blood to help prevent blood loss. Sometimes this increased clotting can happen in other areas of the body, like the legs or lungs. This can block your blood flow and be very dangerous.     Call your provider if you:  Have a red, swollen spot on the back of your leg that is warm or painful when you touch it.   Are coughing up blood.     Infection    Call your provider if you have any of these symptoms:  Fever of 100.4 F (38 C) or higher.  Pain or redness around your stitches if you had an incision.   Any yellow, white, or green fluid coming from places where you had stitches or surgery.    Mood Problems (postpartum depression)    Many people feel sad or have mood changes after having a baby. But for some people, these mood swings are worse.     Call your provider right away if you feel so anxious or nervous that you can't care for yourself or your baby.    Preeclampsia (high blood pressure)    Even if you  didn't have high blood pressure when you were pregnant, you are at risk for the high blood pressure disease called preeclampsia. This risk can last up to 12 weeks after giving birth.     Call your provider if you have:   Pain on your right side under your rib cage  Sudden swelling in the hands and face    Remember: You know your body. If something doesn't feel right, get medical help.     For informational purposes only. Not to replace the advice of your health care provider. Copyright 2020 [facility]. All rights reserved. Clinically reviewed by Kiesha Segovia, RNC-OB, MSN. [employer] 543114 - Rev 02/23.      Pumping Plan    Goal is to pump for 15-20 minutes 8-10 times in 24 hours. (During the daytime pump every 2-3 hours and overnight every 3-4 hours)   Pump your breasts until milk stops dripping and breasts are soft. A soft and well drained breast supports milk supply.   Pumping logs can be helpful in staying on a schedule.  Pumping bra or band can allow you to be able to gently massage breasts while you pump to maximize milk removal.  Turn suction up until a deep tug is felt.  Pumping should not cause pain, if so...   -Turn down suction level   -Use nipple cream before and after pumping   -Check nipple placement in flange    Contact a lactation consultant for further guidance and support.

## 2024-09-15 NOTE — PROGRESS NOTES
D:  Patient desires discharge home.  Discharge orders received and entered by provider.  A:  Discharge instructions reviewed with the patient.  All questions and concerns addressed.  R:  Discharge criteria met. Infant remains in NICU.  The nurse escorted patient to hospital lobby .

## 2024-09-15 NOTE — PROGRESS NOTES
Obstetrics Post-Partum Progress Note          Assessment and Plan:    Kelly Shannon is a 39 year old  Post-partum day #1 s/p Normal spontaneous vaginal delivery.     L&D complications: PPROM  None      Doing well.      Plan:   Discharge later today             Interval History:   Patient is doing well.  and Bottlefed feeding. Pain is controlled without any medications.. No notable symptoms.            Physical Exam:   All vitals stable  Temp: 98.3  F (36.8  C) Temp src: Oral BP: 110/56 Pulse: 63   Resp: 18 SpO2: 97 % O2 Device: None (Room air)    Uterine fundus is firm, non-tender, appropriate size           Data:     Recent Results (from the past 24 hour(s))   Glucose by meter    Collection Time: 24  7:55 AM   Result Value Ref Range    GLUCOSE BY METER POCT 76 70 - 99 mg/dL   Treponema Abs w Reflex to RPR and Titer    Collection Time: 24  8:38 AM   Result Value Ref Range    Treponema Antibody Total Reactive (A) Nonreactive   Extra Purple Top EDTA (LAB USE ONLY)    Collection Time: 24  8:39 AM   Result Value Ref Range    Hold Specimen JIC    Glucose by meter    Collection Time: 24 10:05 AM   Result Value Ref Range    GLUCOSE BY METER POCT 90 70 - 99 mg/dL   Glucose by meter    Collection Time: 09/15/24  6:35 AM   Result Value Ref Range    GLUCOSE BY METER POCT 102 (H) 70 - 99 mg/dL     Recent Labs   Lab Test 24  0552   AS Negative     Recent Labs   Lab Test 24  0552 09/10/24  0715   HGB 10.6* 11.4*     Recent Labs   Lab Test 24  1004   RUQIGG Positive       Luisito Mckay MD

## 2024-09-16 ENCOUNTER — APPOINTMENT (OUTPATIENT)
Dept: INTERPRETER SERVICES | Facility: CLINIC | Age: 39
End: 2024-09-16
Payer: COMMERCIAL

## 2024-09-16 LAB — RPR SER QL: NONREACTIVE

## 2024-09-17 ENCOUNTER — LACTATION ENCOUNTER (OUTPATIENT)
Dept: OTHER | Facility: HOSPITAL | Age: 39
End: 2024-09-17

## 2024-09-17 NOTE — LACTATION NOTE
"This note was copied from a baby's chart.  NICU Follow up:    Gestational Age at Delivery: 34w0d     Corrected Gestational Age: 34w3d    Current Age: 3 days    Method of Feedings: NG, breast, bottle    Breast Assessment:Round and Engorged, Everted and Intact    Hand Hugs/STS/Nuzzling/Latching: Starting to work on latching during ng feeding    Breastfeeding: Hlee reports infant fed on the L side just prior to LC arrival. She reports hearing swallows. She was attempting to latch with infant laying on pillows in a cradle hold on second side. Infant sleeping. Did briefly demonstrated cross cradle hold and infant positioning. She would like LC to come back for feeding support tomorrow.     Pumping Volume p/24hours: Reports pumping drops until today. She last pumped 10mls at 1200 and bedside RN reports she brought in 20ml. She is pumping every 3-4 hours. We reviewed pumping 8x/24 hours. We again discussed her insurance credit to use for a new breast pump, but she reports her home pump is \"working fine.\" Briefly educated on when to switch to maintain phase.     Provided 27mm flange as she reports this is what she is using at home. Declined LC support with pumping.     Adjustments to Plan: Work on latching.     Education:  [] First drops kit  [] Benefits of breast milk  [] How breast milk is made  [] Stages of milk production  [x] Milk supply/goal volumes  [] Hand expression  [] Collecting, labeling, transporting milk  [] Cleaning, sanitizing pump parts  [] Storage of milk  [x] Importance of pumping minimum of 8x in 24 hours   [] Hands on Pumping   [x] Hospital grade pump use and care  [x] Initiate setting   [x] Maintain setting  [] How to rent a hospital grade breast pump  [x] Engorgement  [] Weighted feeding  [] Nipple shield  [x] Latch and positioning   [] Signs of milk transfer  [] Nuzzling  [x] Review how to access lactation consultant prn   "

## 2024-09-18 LAB
PATH REPORT.COMMENTS IMP SPEC: NORMAL
PATH REPORT.COMMENTS IMP SPEC: NORMAL
PATH REPORT.FINAL DX SPEC: NORMAL
PATH REPORT.GROSS SPEC: NORMAL
PATH REPORT.MICROSCOPIC SPEC OTHER STN: NORMAL
PATH REPORT.RELEVANT HX SPEC: NORMAL
PHOTO IMAGE: NORMAL

## 2024-09-19 LAB — T PALLIDUM AB SER QL AGGL: REACTIVE

## 2024-09-23 ENCOUNTER — LACTATION ENCOUNTER (OUTPATIENT)
Dept: OTHER | Facility: HOSPITAL | Age: 39
End: 2024-09-23

## 2024-09-23 NOTE — LACTATION NOTE
This note was copied from a baby's chart.  NICU Follow up:    Gestational Age at Delivery: 34w0d     Corrected Gestational Age: 35w2d    Current Age: 9do    Method of Feedings: NG, 65%PO     Breast Assessment: Round, Symmetrical, Everted and Intact    Hand Hugs/STS/Nuzzling/Latching/Weighted feeds: Weighted feeds, Last 2 breastfeedings infant transferred 40ml and 36ml.     Visit Summary: Mother independently latching infant.Swallows 3:1-1:1. Infant transferred 36ml in 15 minutes. Infant did have two coughing moments, Mother took infant off breast and sat infant up, no desats or brown episode with them. Mother able to put infant to breast after. Encouraged Mother to pump after putting infant to breast, as she has not been doing this.      Pumping Volume p/24hours: Pumping about every 3-4 hours during the daytime and nighttime. Collecting about 2oz each time.     Adjustments to Plan: Pumping after breastfeeding to support her milk supply.     Education:  [] First drops kit  [] Benefits of breast milk  [] How breast milk is made  [] Stages of milk production  [] Milk supply/goal volumes  [] Hand expression  [] Collecting, labeling, transporting milk  [] Cleaning, sanitizing pump parts  [] Storage of milk  [x] Importance of pumping minimum of 8x in 24 hours   [] Hands on Pumping   [] Hospital grade pump use and care  [] Initiate setting   [] Maintain setting  [] How to rent a hospital grade breast pump  [] Engorgement  [x] Weighted feeding  [] Nipple shield  [] Latch and positioning   [x] Signs of milk transfer  [] Nuzzling  [x] Review how to access lactation consultant prn

## 2024-09-27 ENCOUNTER — APPOINTMENT (OUTPATIENT)
Dept: CT IMAGING | Facility: HOSPITAL | Age: 39
End: 2024-09-27
Attending: EMERGENCY MEDICINE
Payer: COMMERCIAL

## 2024-09-27 ENCOUNTER — HOSPITAL ENCOUNTER (EMERGENCY)
Facility: HOSPITAL | Age: 39
Discharge: HOME | End: 2024-09-27
Attending: EMERGENCY MEDICINE | Admitting: EMERGENCY MEDICINE
Payer: COMMERCIAL

## 2024-09-27 ENCOUNTER — LACTATION ENCOUNTER (OUTPATIENT)
Dept: OTHER | Facility: HOSPITAL | Age: 39
End: 2024-09-27

## 2024-09-27 VITALS
RESPIRATION RATE: 18 BRPM | WEIGHT: 132 LBS | DIASTOLIC BLOOD PRESSURE: 79 MMHG | SYSTOLIC BLOOD PRESSURE: 112 MMHG | HEART RATE: 61 BPM | HEIGHT: 61 IN | TEMPERATURE: 98.2 F | OXYGEN SATURATION: 98 % | BODY MASS INDEX: 24.92 KG/M2

## 2024-09-27 DIAGNOSIS — R07.9 CHEST PAIN, UNSPECIFIED TYPE: ICD-10-CM

## 2024-09-27 LAB
ALBUMIN SERPL BCG-MCNC: 4 G/DL (ref 3.5–5.2)
ALP SERPL-CCNC: 97 U/L (ref 40–150)
ALT SERPL W P-5'-P-CCNC: 23 U/L (ref 0–50)
ANION GAP SERPL CALCULATED.3IONS-SCNC: 12 MMOL/L (ref 7–15)
AST SERPL W P-5'-P-CCNC: 25 U/L (ref 0–45)
BASOPHILS # BLD AUTO: 0 10E3/UL (ref 0–0.2)
BASOPHILS NFR BLD AUTO: 0 %
BILIRUB DIRECT SERPL-MCNC: <0.2 MG/DL (ref 0–0.3)
BILIRUB SERPL-MCNC: 0.4 MG/DL
BUN SERPL-MCNC: 26.8 MG/DL (ref 6–20)
CALCIUM SERPL-MCNC: 8.6 MG/DL (ref 8.8–10.4)
CHLORIDE SERPL-SCNC: 107 MMOL/L (ref 98–107)
CREAT SERPL-MCNC: 0.62 MG/DL (ref 0.51–0.95)
EGFRCR SERPLBLD CKD-EPI 2021: >90 ML/MIN/1.73M2
EOSINOPHIL # BLD AUTO: 0.2 10E3/UL (ref 0–0.7)
EOSINOPHIL NFR BLD AUTO: 2 %
ERYTHROCYTE [DISTWIDTH] IN BLOOD BY AUTOMATED COUNT: 12.6 % (ref 10–15)
GLUCOSE SERPL-MCNC: 88 MG/DL (ref 70–99)
HCO3 SERPL-SCNC: 20 MMOL/L (ref 22–29)
HCT VFR BLD AUTO: 41.4 % (ref 35–47)
HGB BLD-MCNC: 13.8 G/DL (ref 11.7–15.7)
IMM GRANULOCYTES # BLD: 0 10E3/UL
IMM GRANULOCYTES NFR BLD: 0 %
LIPASE SERPL-CCNC: 43 U/L (ref 13–60)
LYMPHOCYTES # BLD AUTO: 2.8 10E3/UL (ref 0.8–5.3)
LYMPHOCYTES NFR BLD AUTO: 34 %
MCH RBC QN AUTO: 30.6 PG (ref 26.5–33)
MCHC RBC AUTO-ENTMCNC: 33.3 G/DL (ref 31.5–36.5)
MCV RBC AUTO: 92 FL (ref 78–100)
MONOCYTES # BLD AUTO: 0.6 10E3/UL (ref 0–1.3)
MONOCYTES NFR BLD AUTO: 8 %
NEUTROPHILS # BLD AUTO: 4.7 10E3/UL (ref 1.6–8.3)
NEUTROPHILS NFR BLD AUTO: 56 %
NRBC # BLD AUTO: 0 10E3/UL
NRBC BLD AUTO-RTO: 0 /100
PLATELET # BLD AUTO: 286 10E3/UL (ref 150–450)
POTASSIUM SERPL-SCNC: 4.1 MMOL/L (ref 3.4–5.3)
PROT SERPL-MCNC: 7.9 G/DL (ref 6.4–8.3)
RBC # BLD AUTO: 4.51 10E6/UL (ref 3.8–5.2)
SODIUM SERPL-SCNC: 139 MMOL/L (ref 135–145)
TROPONIN T SERPL HS-MCNC: <6 NG/L
WBC # BLD AUTO: 8.3 10E3/UL (ref 4–11)

## 2024-09-27 PROCEDURE — 80048 BASIC METABOLIC PNL TOTAL CA: CPT | Performed by: EMERGENCY MEDICINE

## 2024-09-27 PROCEDURE — 93005 ELECTROCARDIOGRAM TRACING: CPT | Performed by: EMERGENCY MEDICINE

## 2024-09-27 PROCEDURE — 93005 ELECTROCARDIOGRAM TRACING: CPT | Performed by: STUDENT IN AN ORGANIZED HEALTH CARE EDUCATION/TRAINING PROGRAM

## 2024-09-27 PROCEDURE — 250N000011 HC RX IP 250 OP 636: Performed by: EMERGENCY MEDICINE

## 2024-09-27 PROCEDURE — 36415 COLL VENOUS BLD VENIPUNCTURE: CPT | Performed by: EMERGENCY MEDICINE

## 2024-09-27 PROCEDURE — 96374 THER/PROPH/DIAG INJ IV PUSH: CPT | Mod: XU

## 2024-09-27 PROCEDURE — 85004 AUTOMATED DIFF WBC COUNT: CPT | Performed by: EMERGENCY MEDICINE

## 2024-09-27 PROCEDURE — 85014 HEMATOCRIT: CPT | Performed by: EMERGENCY MEDICINE

## 2024-09-27 PROCEDURE — 84484 ASSAY OF TROPONIN QUANT: CPT | Performed by: EMERGENCY MEDICINE

## 2024-09-27 PROCEDURE — 83690 ASSAY OF LIPASE: CPT | Performed by: EMERGENCY MEDICINE

## 2024-09-27 PROCEDURE — 99285 EMERGENCY DEPT VISIT HI MDM: CPT | Mod: 25

## 2024-09-27 PROCEDURE — 71275 CT ANGIOGRAPHY CHEST: CPT

## 2024-09-27 PROCEDURE — 82248 BILIRUBIN DIRECT: CPT | Performed by: EMERGENCY MEDICINE

## 2024-09-27 RX ORDER — IOPAMIDOL 755 MG/ML
90 INJECTION, SOLUTION INTRAVASCULAR ONCE
Status: COMPLETED | OUTPATIENT
Start: 2024-09-27 | End: 2024-09-27

## 2024-09-27 RX ADMIN — FAMOTIDINE 20 MG: 10 INJECTION, SOLUTION INTRAVENOUS at 15:29

## 2024-09-27 RX ADMIN — IOPAMIDOL 90 ML: 755 INJECTION, SOLUTION INTRAVENOUS at 14:48

## 2024-09-27 ASSESSMENT — ENCOUNTER SYMPTOMS
FEVER: 0
DIFFICULTY URINATING: 0
BACK PAIN: 1
FLANK PAIN: 0
ABDOMINAL PAIN: 0

## 2024-09-27 ASSESSMENT — COLUMBIA-SUICIDE SEVERITY RATING SCALE - C-SSRS
1. IN THE PAST MONTH, HAVE YOU WISHED YOU WERE DEAD OR WISHED YOU COULD GO TO SLEEP AND NOT WAKE UP?: NO
2. HAVE YOU ACTUALLY HAD ANY THOUGHTS OF KILLING YOURSELF IN THE PAST MONTH?: NO
6. HAVE YOU EVER DONE ANYTHING, STARTED TO DO ANYTHING, OR PREPARED TO DO ANYTHING TO END YOUR LIFE?: NO

## 2024-09-27 NOTE — ED TRIAGE NOTES
Patient presents here with chest pain that has occurred over the past 24 hours. She notes that the chest pain comes and goes. She becomes short of breath with the pain and that it feels deep in her chest. She denies dizziness. Additionally, she delivered a single infant on 9/14 at 34 weeks gestation due to premature breaking of her water. She was then induced into labor.      Triage Assessment (Adult)       Row Name 09/27/24 1313          Triage Assessment    Airway WDL WDL        Respiratory WDL    Respiratory WDL WDL        Skin Circulation/Temperature WDL    Skin Circulation/Temperature WDL WDL        Cardiac WDL    Cardiac WDL WDL        Peripheral/Neurovascular WDL    Peripheral Neurovascular WDL WDL        Cognitive/Neuro/Behavioral WDL    Cognitive/Neuro/Behavioral WDL WDL

## 2024-09-27 NOTE — ED PROVIDER NOTES
EMERGENCY DEPARTMENT ENCOUNTER      NAME: Kelly Shannon  AGE: 39 year old female  YOB: 1985  MRN: [id number]  EVALUATION DATE & TIME: No admission date for patient encounter.    PCP: [facility], [facility]    ED PROVIDER: Campos Joel MD      Chief Complaint   Patient presents with    Chest Pain         FINAL IMPRESSION:  1. Chest pain, unspecified type          ED COURSE & MEDICAL DECISION MAKING:    Pertinent Labs & Imaging studies reviewed. (See chart for details)  39 year old female presents to the Emergency Department for evaluation of pleuritic chest pain    ED Course as of 09/27/24 1505   Fri Sep 27, 2024   1404 Differential includes ACS, pulmonary emboli, pneumonia, aortic dissection, esophageal rupture, pneumothorax, pneumonia, malignancy, pleurisy, shingles, and GERD.  Based on history and examination pulmonary emboli and aortic dissection unlikely.      1415 WBC: 8.3   1504 I independently reviewed CTA PE and did not note any pneumonia or large pulmonary emboli   No breast pain, fever, breast tenderness to suggest mastitis    Preeclampsia unlikely.  CT without pneumonia or pulmonary emboli.    Unclear etiology of symptoms.  Plan for discharge home and follow-up primary care doctor and return to the ER for worsening symptoms      1406 - I met the patient and performed my initial interview and exam. Exam limited due to being performed in triage area.  1507 - Rechecked and updated patient on labs and imaging. We discussed plan for discharge and all questions were answered.    Medical Decision Making  Obtained supplemental history:Supplemental history obtained?: Documented in chart  Reviewed external records: External records reviewed?: Documented in chart  Care impacted by chronic illness:Documented in Chart  Did you consider but not order tests?: Work up considered but not performed and documented in chart, if applicable  Did you interpret images independently?: Independent  interpretation of ECG and images noted in documentation, when applicable.  Consultation discussion with other provider:Did you involve another provider (consultant, , pharmacy, etc.)?: No  Discharge. No recommendations on prescription strength medication(s). See documentation for any additional details.    MIPS: CT Pulmonary Angiogram:Patient is moderate to high risk for PE.            At the conclusion of the encounter I discussed the results of all of the tests and the disposition. The questions were answered. The patient or family acknowledged understanding and was agreeable with the care plan.         MEDICATIONS GIVEN IN THE EMERGENCY:  Medications   famotidine (PEPCID) injection 20 mg (has no administration in time range)   iopamidol (ISOVUE-370) solution 90 mL (90 mLs Intravenous $Given 9/27/24 6650)       NEW PRESCRIPTIONS STARTED AT TODAY'S ER VISIT  New Prescriptions    No medications on file          =================================================================    HPI    Patient information was obtained from: patient    Use of : Yes ( Phone ) - Language Elis Shannon is a 39 year old female with a pertinent history of recent pregnancy who presents to this ED via walk-in for evaluation of chest pain.    The patient recently had a baby. Today, she came to the hospital to see her child and developed sudden anterior upper chest pain that travels to her upper back. She notes a lot of pain, and that it's currently present. She was unsure if this was relate to breast feeding. The patient used Tylenol yesterday, and has tried no antacid medication today.     The patient notes a history of kidney stones. She states that during her pregnancy the stone was suspected to have shifted, but no imaging was acquired due to pregnancy. She states that her current pain is not like previous stone pain, and she denies flank pain and difficulty urinating.    The patient denies breast redness, breast  swelling, or breast pain. She had some abdominal pain after giving birth but no significant pain now. She denies fever.       REVIEW OF SYSTEMS   Review of Systems   Constitutional:  Negative for fever.   Cardiovascular:  Positive for chest pain (upper).   Gastrointestinal:  Negative for abdominal pain.   Genitourinary:  Negative for difficulty urinating and flank pain.   Musculoskeletal:  Positive for back pain.        PAST MEDICAL HISTORY:  Past Medical History:   Diagnosis Date    Cervical high risk HPV (human papillomavirus) test positive 11/10/2021    6/7/13 NIL 10/20/17 NIL pap, neg HPV 11/10/21 LSIL, +HR HPV (not 16/18). Plan: cotest in 1 year / await provider    Immune thrombocytopenic purpura (H)     Created by Conversion     Kidney stone     Varicose vein of leg        PAST SURGICAL HISTORY:  Past Surgical History:   Procedure Laterality Date     SECTION N/A 2020    Procedure: PRIMARY  SECTION;  Surgeon: Brenda Whitney MD;  Location: [facility];  Service: Obstetrics    NO PAST SURGERIES             CURRENT MEDICATIONS:    diclofenac (VOLTAREN) 1 % topical gel  nitroFURantoin macrocrystal-monohydrate (MACROBID) 100 MG capsule  ondansetron (ZOFRAN ODT) 4 MG ODT tab  Prenatal Vit-Fe Fumarate-FA (PRENATAL MULTIVITAMIN  PLUS IRON) 27-1 MG TABS        ALLERGIES:  No Known Allergies    FAMILY HISTORY:  Family History   Problem Relation Age of Onset    No Known Problems Daughter     Urolithiasis No family hx of     Gout No family hx of     Clotting Disorder No family hx of     Diabetes No family hx of     Cancer No family hx of     Heart Disease No family hx of        SOCIAL HISTORY:   Social History     Socioeconomic History    Marital status:    Tobacco Use    Smoking status: Never    Smokeless tobacco: Never   Substance and Sexual Activity    Alcohol use: No    Drug use: Never    Sexual activity: Not Currently     Birth control/protection: None     Social  "Determinants of Health     Financial Resource Strain: Low Risk  (9/10/2024)    Financial Resource Strain     Within the past 12 months, have you or your family members you live with been unable to get utilities (heat, electricity) when it was really needed?: No   Food Insecurity: Low Risk  (9/10/2024)    Food Insecurity     Within the past 12 months, did you worry that your food would run out before you got money to buy more?: No     Within the past 12 months, did the food you bought just not last and you didn t have money to get more?: No   Transportation Needs: Low Risk  (9/10/2024)    Transportation Needs     Within the past 12 months, has lack of transportation kept you from medical appointments, getting your medicines, non-medical meetings or appointments, work, or from getting things that you need?: No    Received from [facility], [facility]    Social Connections   Interpersonal Safety: Low Risk  (9/10/2024)    Interpersonal Safety     Do you feel physically and emotionally safe where you currently live?: Yes     Within the past 12 months, have you been hit, slapped, kicked or otherwise physically hurt by someone?: No     Within the past 12 months, have you been humiliated or emotionally abused in other ways by your partner or ex-partner?: No   Housing Stability: Low Risk  (9/10/2024)    Housing Stability     Do you have housing? : Yes     Are you worried about losing your housing?: No       VITALS:  /83   Pulse 67   Temp 98.2  F (36.8  C)   Resp 18   Ht 1.549 m (5' 1\")   Wt 59.9 kg (132 lb)   LMP 01/20/2024   SpO2 99%   BMI 24.94 kg/m      PHYSICAL EXAM      Vitals: /83   Pulse 67   Temp 98.2  F (36.8  C)   Resp 18   Ht 1.549 m (5' 1\")   Wt 59.9 kg (132 lb)   LMP 01/20/2024   SpO2 99%   BMI 24.94 kg/m    General: Appears in no acute distress, awake, alert, interactive.  Eyes: Conjunctivae non-injected. Sclera " anicteric.  HENT: Atraumatic.  Neck: Supple.  Respiratory/Chest: Respiration unlabored. No breast tenderness; in triage area so no full exam of breasts.  Abdomen: non distended. Non-tender  Musculoskeletal: Normal extremities. No edema or erythema.  Skin: Normal color. No rash or diaphoresis.  Neurologic: Face symmetric, moves all extremities spontaneously. Speech clear.  Psychiatric: Oriented to person, place, and time. Affect appropriate.    LAB:  All pertinent labs reviewed and interpreted.  Results for orders placed or performed during the hospital encounter of 09/27/24   CT Chest Pulmonary Embolism w Contrast    Impression    IMPRESSION:  1.  Normal CTA chest.   Result Value Ref Range    Troponin T, High Sensitivity <6 <=14 ng/L   Basic metabolic panel   Result Value Ref Range    Sodium 139 135 - 145 mmol/L    Potassium 4.1 3.4 - 5.3 mmol/L    Chloride 107 98 - 107 mmol/L    Carbon Dioxide (CO2) 20 (L) 22 - 29 mmol/L    Anion Gap 12 7 - 15 mmol/L    Urea Nitrogen 26.8 (H) 6.0 - 20.0 mg/dL    Creatinine 0.62 0.51 - 0.95 mg/dL    GFR Estimate >90 >60 mL/min/1.73m2    Calcium 8.6 (L) 8.8 - 10.4 mg/dL    Glucose 88 70 - 99 mg/dL   Hepatic function panel   Result Value Ref Range    Protein Total 7.9 6.4 - 8.3 g/dL    Albumin 4.0 3.5 - 5.2 g/dL    Bilirubin Total 0.4 <=1.2 mg/dL    Alkaline Phosphatase 97 40 - 150 U/L    AST 25 0 - 45 U/L    ALT 23 0 - 50 U/L    Bilirubin Direct <0.20 0.00 - 0.30 mg/dL   Result Value Ref Range    Lipase 43 13 - 60 U/L   CBC with platelets and differential   Result Value Ref Range    WBC Count 8.3 4.0 - 11.0 10e3/uL    RBC Count 4.51 3.80 - 5.20 10e6/uL    Hemoglobin 13.8 11.7 - 15.7 g/dL    Hematocrit 41.4 35.0 - 47.0 %    MCV 92 78 - 100 fL    MCH 30.6 26.5 - 33.0 pg    MCHC 33.3 31.5 - 36.5 g/dL    RDW 12.6 10.0 - 15.0 %    Platelet Count 286 150 - 450 10e3/uL    % Neutrophils 56 %    % Lymphocytes 34 %    % Monocytes 8 %    % Eosinophils 2 %    % Basophils 0 %    % Immature  Granulocytes 0 %    NRBCs per 100 WBC 0 <1 /100    Absolute Neutrophils 4.7 1.6 - 8.3 10e3/uL    Absolute Lymphocytes 2.8 0.8 - 5.3 10e3/uL    Absolute Monocytes 0.6 0.0 - 1.3 10e3/uL    Absolute Eosinophils 0.2 0.0 - 0.7 10e3/uL    Absolute Basophils 0.0 0.0 - 0.2 10e3/uL    Absolute Immature Granulocytes 0.0 <=0.4 10e3/uL    Absolute NRBCs 0.0 10e3/uL       RADIOLOGY:  Reviewed all pertinent imaging. Please see official radiology report.  CT Chest Pulmonary Embolism w Contrast   Final Result   IMPRESSION:   1.  Normal CTA chest.          EKG:    Performed at: 27-Sep-2024 13:15    Impression: sinus rhythm    Rate: 65  Rhythm: sinus  Axis: 41  NJ Interval: 162  QRS Interval: 76  QTc Interval: 438  ST Changes: No ST changes.  Comparison: No previous ECGs available.     I have independently reviewed and interpreted the EKG(s) documented above.        I, Alfa Wynn, am serving as a scribe to document services personally performed by Campos Joel MD based on my observation and the provider's statements to me. I, Campos Joel MD, attest that Alfa Wynn is acting in a scribe capacity, has observed my performance of the services and has documented them in accordance with my direction.    Campos Joel MD  [facility] EMERGENCY DEPARTMENT  [address] [address]  [phone]      Campos Joel MD  09/27/24 1717

## 2024-09-27 NOTE — ED TRIAGE NOTES
Triage Assessment (Adult)       Row Name 09/27/24 1313          Triage Assessment    Airway WDL WDL        Respiratory WDL    Respiratory WDL WDL        Skin Circulation/Temperature WDL    Skin Circulation/Temperature WDL WDL        Cardiac WDL    Cardiac WDL WDL        Peripheral/Neurovascular WDL    Peripheral Neurovascular WDL WDL        Cognitive/Neuro/Behavioral WDL    Cognitive/Neuro/Behavioral WDL WDL

## 2024-09-27 NOTE — DISCHARGE INSTRUCTIONS
Your chest pain evaluation in the ER is reassuring.  Follow-up with your primary care doctor.  Return to the ER for worsening symptoms especially if you develop a fever.

## 2024-09-27 NOTE — LACTATION NOTE
"This note was copied from a baby's chart.  NICU Follow up:    Gestational Age at Delivery: 34w0d     Corrected Gestational Age: 35w6d    Current Age: 13 days old    Method of Feedings: Breast and bottle, 100% PO     Breastfeeding goals: plans to breastfeed for 1 month at home before switching to formula.     Breast Assessment: Breasts: Round and Soft with \"tightness\" in the armpit area and along the top of the breast, Nipples: Everted and Intact    Hand Hugs/STS/Nuzzling/Latching/ Weighted feeds: Reviewed discharge instruction on supporting latching at home, how to know infant is getting enough to eat and when to supplement.     Visit Summary:  was used for this visit. Kelly reports pumping with the suction too high last night and since then has felt \"pain and tightness in my chest radiating to my back when breathing.\" She reports the tightness comes and goes. She did not feel it during our visit, but has been feeling the pain/tightness intermittently. Assessed for symptoms of ductal narrowing and reviewed breast gymnastics. Encouraged Kelly to call her clinic to be evaluated by her doctor if this does not resolve. Reminded Kelly that her clinic is likely not open over the weekend.     Reviewed discharge instructions, outpatient lactation support and breastmilk/formula supplementation briefly. Bedside RN will reviewed fortification in detail.     Pumping Volume p/24hours: Kelly is pumping about 80ml every 3-4 hours. Reviewed continuing to pump after nursing to support milk supply. Kelly reports that not being realistic at home. Encouraged to pump as able.     Adjustments to Plan: Discharging today or tomorrow.     LC to provide and review use of the Medela Max Flow pump.     Education:  [] First drops kit  [] Benefits of breast milk  [] How breast milk is made  [] Stages of milk production  [] Milk supply/goal volumes  [] Hand expression  [] Collecting, labeling, transporting milk  [] Cleaning, sanitizing pump " parts  [] Storage of milk  [] Importance of pumping minimum of 8x in 24 hours   [] Hands on Pumping   [] Hospital grade pump use and care  [] Initiate setting   [] Maintain setting  [] How to rent a hospital grade breast pump  [] Engorgement  [] Increasing milk supply  [x] Mastitis/Ductal narrowing  [] Weighted feeding  [] Nipple shield  [] Latch and positioning   [] Signs of milk transfer  [] Nuzzling  [] Review how to access lactation consultant prn

## 2024-09-28 LAB
ATRIAL RATE - MUSE: 65 BPM
DIASTOLIC BLOOD PRESSURE - MUSE: NORMAL MMHG
INTERPRETATION ECG - MUSE: NORMAL
P AXIS - MUSE: 48 DEGREES
PR INTERVAL - MUSE: 162 MS
QRS DURATION - MUSE: 76 MS
QT - MUSE: 422 MS
QTC - MUSE: 438 MS
R AXIS - MUSE: 41 DEGREES
SYSTOLIC BLOOD PRESSURE - MUSE: NORMAL MMHG
T AXIS - MUSE: 50 DEGREES
VENTRICULAR RATE- MUSE: 65 BPM

## 2024-11-22 ENCOUNTER — MEDICAL CORRESPONDENCE (OUTPATIENT)
Dept: HEALTH INFORMATION MANAGEMENT | Facility: CLINIC | Age: 39
End: 2024-11-22
Payer: COMMERCIAL

## 2025-01-12 ENCOUNTER — HEALTH MAINTENANCE LETTER (OUTPATIENT)
Age: 40
End: 2025-01-12

## 2025-03-23 NOTE — TELEPHONE ENCOUNTER
Patient recently evaluated for UTI.  Started on Bactrim DS.  Culture shows resistance.  Sensitive to Augmentin.  Rx for Augmentin was sent to the patient's pharmacy on file.   24

## 2025-03-31 ENCOUNTER — MEDICAL CORRESPONDENCE (OUTPATIENT)
Dept: HEALTH INFORMATION MANAGEMENT | Facility: CLINIC | Age: 40
End: 2025-03-31
Payer: COMMERCIAL

## 2025-04-01 ENCOUNTER — TELEPHONE (OUTPATIENT)
Dept: UROLOGY | Facility: CLINIC | Age: 40
End: 2025-04-01
Payer: COMMERCIAL

## 2025-04-01 NOTE — TELEPHONE ENCOUNTER
[facility] Call Center    Phone Message    May a detailed message be left on voicemail: yes     Reason for Call: Other: pt calling needs to reschedule her appt, no schedule available     Action Taken: Other: urology    Travel Screening: Not Applicable     Date of Service:

## 2025-04-02 NOTE — CONFIDENTIAL NOTE
Writer called and spoke with patient. Appointment re-scheduled.    Felicity FLORES RN Urology 4/2/2025 11:01 AM

## 2025-05-05 ENCOUNTER — HOSPITAL ENCOUNTER (OUTPATIENT)
Dept: ULTRASOUND IMAGING | Facility: CLINIC | Age: 40
Discharge: HOME | End: 2025-05-05
Attending: MASSAGE THERAPIST | Admitting: MASSAGE THERAPIST
Payer: COMMERCIAL

## 2025-05-05 DIAGNOSIS — Z87.442 HISTORY OF KIDNEY STONES: ICD-10-CM

## 2025-05-05 PROCEDURE — 76770 US EXAM ABDO BACK WALL COMP: CPT

## 2025-05-07 ENCOUNTER — RESULTS FOLLOW-UP (OUTPATIENT)
Dept: UROLOGY | Facility: CLINIC | Age: 40
End: 2025-05-07